# Patient Record
Sex: FEMALE | Race: WHITE | Employment: UNEMPLOYED | URBAN - METROPOLITAN AREA
[De-identification: names, ages, dates, MRNs, and addresses within clinical notes are randomized per-mention and may not be internally consistent; named-entity substitution may affect disease eponyms.]

---

## 2017-01-06 RX ORDER — DULOXETIN HYDROCHLORIDE 30 MG/1
CAPSULE, DELAYED RELEASE ORAL
Qty: 30 CAPSULE | Refills: 1 | Status: SHIPPED | OUTPATIENT
Start: 2017-01-06 | End: 2017-10-26

## 2017-01-06 NOTE — TELEPHONE ENCOUNTER
LOV: 10/28/16  Future office visit: No Upcoming Visit  Last labs: 7/28/16 Cmp Cbc Lipid Tsh  Last RX: 9/23/16 #90 # 3 refills  Per protocol: Route to Provider

## 2017-01-09 RX ORDER — DULOXETIN HYDROCHLORIDE 30 MG/1
CAPSULE, DELAYED RELEASE ORAL
Qty: 90 CAPSULE | Refills: 1 | OUTPATIENT
Start: 2017-01-09

## 2017-10-18 ENCOUNTER — TELEPHONE (OUTPATIENT)
Dept: INTERNAL MEDICINE CLINIC | Facility: CLINIC | Age: 57
End: 2017-10-18

## 2017-10-18 DIAGNOSIS — I10 ESSENTIAL HYPERTENSION: Primary | ICD-10-CM

## 2017-10-18 DIAGNOSIS — Z13.220 LIPID SCREENING: ICD-10-CM

## 2017-10-18 DIAGNOSIS — Z12.39 BREAST CANCER SCREENING: ICD-10-CM

## 2017-10-18 DIAGNOSIS — D51.0 PERNICIOUS ANEMIA: ICD-10-CM

## 2017-10-18 DIAGNOSIS — R92.8 ABNORMAL MAMMOGRAM: ICD-10-CM

## 2017-10-18 NOTE — TELEPHONE ENCOUNTER
CPE Future Appointments  Date Time Provider Dianne Dial   10/18/2017 9:45 AM Jd Kaplan MD [facility]   10/26/2017 4:00 PM Jae Mclaughlin PA-C [facility]     Orders to  Royce Olvera   aware must fast no call back required

## 2017-10-19 NOTE — TELEPHONE ENCOUNTER
Please call pt to let her know labs and mammogram ordered for [facility]. Thank you.

## 2017-10-20 ENCOUNTER — LAB ENCOUNTER (OUTPATIENT)
Dept: LAB | Facility: HOSPITAL | Age: 57
End: 2017-10-20
Attending: PHYSICIAN ASSISTANT
Payer: COMMERCIAL

## 2017-10-20 DIAGNOSIS — E53.8 B12 DEFICIENCY: ICD-10-CM

## 2017-10-20 DIAGNOSIS — Z13.220 LIPID SCREENING: ICD-10-CM

## 2017-10-20 DIAGNOSIS — I10 ESSENTIAL HYPERTENSION: ICD-10-CM

## 2017-10-20 DIAGNOSIS — D51.0 PERNICIOUS ANEMIA: ICD-10-CM

## 2017-10-20 PROCEDURE — 85025 COMPLETE CBC W/AUTO DIFF WBC: CPT

## 2017-10-20 PROCEDURE — 80053 COMPREHEN METABOLIC PANEL: CPT

## 2017-10-20 PROCEDURE — 82607 VITAMIN B-12: CPT

## 2017-10-20 PROCEDURE — 80061 LIPID PANEL: CPT

## 2017-10-20 PROCEDURE — 36415 COLL VENOUS BLD VENIPUNCTURE: CPT

## 2017-10-26 ENCOUNTER — OFFICE VISIT (OUTPATIENT)
Dept: INTERNAL MEDICINE CLINIC | Facility: CLINIC | Age: 57
End: 2017-10-26

## 2017-10-26 VITALS
OXYGEN SATURATION: 96 % | DIASTOLIC BLOOD PRESSURE: 78 MMHG | HEIGHT: 61.5 IN | RESPIRATION RATE: 18 BRPM | TEMPERATURE: 98 F | SYSTOLIC BLOOD PRESSURE: 106 MMHG | HEART RATE: 88 BPM | BODY MASS INDEX: 43.43 KG/M2 | WEIGHT: 233 LBS

## 2017-10-26 DIAGNOSIS — Z12.11 COLON CANCER SCREENING: ICD-10-CM

## 2017-10-26 DIAGNOSIS — M06.9 RHEUMATOID ARTHRITIS, INVOLVING UNSPECIFIED SITE, UNSPECIFIED RHEUMATOID FACTOR PRESENCE: ICD-10-CM

## 2017-10-26 DIAGNOSIS — L98.9 SKIN LESION: ICD-10-CM

## 2017-10-26 DIAGNOSIS — E78.00 PURE HYPERCHOLESTEROLEMIA: ICD-10-CM

## 2017-10-26 DIAGNOSIS — Z00.00 ANNUAL PHYSICAL EXAM: Primary | ICD-10-CM

## 2017-10-26 DIAGNOSIS — I10 ESSENTIAL HYPERTENSION: ICD-10-CM

## 2017-10-26 PROCEDURE — 99396 PREV VISIT EST AGE 40-64: CPT | Performed by: PHYSICIAN ASSISTANT

## 2017-10-26 RX ORDER — ROSUVASTATIN CALCIUM 5 MG/1
5 TABLET, COATED ORAL NIGHTLY
Qty: 30 TABLET | Refills: 2 | Status: SHIPPED | OUTPATIENT
Start: 2017-10-26 | End: 2018-01-23

## 2017-10-26 RX ORDER — PANTOPRAZOLE SODIUM 40 MG/1
TABLET, DELAYED RELEASE ORAL
Refills: 1 | Status: CANCELLED | OUTPATIENT
Start: 2017-10-26

## 2017-10-26 RX ORDER — LISINOPRIL AND HYDROCHLOROTHIAZIDE 20; 12.5 MG/1; MG/1
1 TABLET ORAL DAILY
Qty: 90 TABLET | Refills: 3 | Status: SHIPPED | OUTPATIENT
Start: 2017-10-26 | End: 2018-10-28

## 2017-10-26 NOTE — PROGRESS NOTES
Patient presents with:  Physical: pt needs an order for a mamm. pt does not want pap she does not have a uterus. ROOM 7      HPI:  Pt presents for annual physical.  She states she has seen a Rheum who has diagnosed her with RA.   Pt reports h/o hyst so she Osteoarthritis of knees, bilateral     Rotator cuff tear arthropathy of both shoulders      Past Medical History:   Diagnosis Date   • Arthritis    • Fibroid 2003   • Fibromyalgia    • HIGH CHOLESTEROL    • HYPERTENSION    • Ovarian cyst 2003     Past Surg to do hemoccult testing  Pap:  S/p hyst.  Mammogram:  Overdue      Physical Exam  /78   Pulse 88   Temp 98 °F (36.7 °C) (Oral)   Resp 18   Ht 61.5\"   Wt 233 lb   SpO2 96%   BMI 43.31 kg/m²   Constitutional: Oriented to person, place, and time.  No di 10/20/2017 3.9  3.6 - 5.1 mmol/L Final   • Chloride 10/20/2017 103  101 - 111 mmol/L Final   • CO2 10/20/2017 29.0  22.0 - 32.0 mmol/L Final   • Vitamin B12 10/20/2017 804  193 - 986 pg/mL Final   • Cholesterol, Total 10/20/2017 237* <200 mg/dL Final   • T lesion - Derm referral.    Colon cancer screening  Rheumatoid arthritis, involving unspecified site, unspecified rheumatoid factor presence (hcc) - Pt is following with out of network Rheum, she cannot remember the name today.   Essential hypertension - Con

## 2017-11-16 ENCOUNTER — HOSPITAL ENCOUNTER (OUTPATIENT)
Dept: MAMMOGRAPHY | Facility: HOSPITAL | Age: 57
Discharge: HOME | End: 2017-11-16
Attending: PHYSICIAN ASSISTANT
Payer: COMMERCIAL

## 2017-11-16 DIAGNOSIS — Z12.39 BREAST CANCER SCREENING: ICD-10-CM

## 2017-11-16 DIAGNOSIS — R92.8 ABNORMAL MAMMOGRAM: ICD-10-CM

## 2017-11-16 PROCEDURE — 77062 BREAST TOMOSYNTHESIS BI: CPT | Performed by: PHYSICIAN ASSISTANT

## 2017-11-16 PROCEDURE — 77066 DX MAMMO INCL CAD BI: CPT | Performed by: PHYSICIAN ASSISTANT

## 2017-11-16 PROCEDURE — 76642 ULTRASOUND BREAST LIMITED: CPT | Performed by: PHYSICIAN ASSISTANT

## 2018-01-24 RX ORDER — ROSUVASTATIN CALCIUM 5 MG/1
TABLET, COATED ORAL
Qty: 30 TABLET | Refills: 1 | Status: SHIPPED | OUTPATIENT
Start: 2018-01-24 | End: 2018-06-07

## 2018-05-08 ENCOUNTER — MED REC SCAN ONLY (OUTPATIENT)
Dept: INTERNAL MEDICINE CLINIC | Facility: CLINIC | Age: 58
End: 2018-05-08

## 2018-06-07 ENCOUNTER — OFFICE VISIT (OUTPATIENT)
Dept: INTERNAL MEDICINE CLINIC | Facility: CLINIC | Age: 58
End: 2018-06-07

## 2018-06-07 VITALS
TEMPERATURE: 97 F | RESPIRATION RATE: 18 BRPM | WEIGHT: 241 LBS | SYSTOLIC BLOOD PRESSURE: 132 MMHG | BODY MASS INDEX: 44.92 KG/M2 | DIASTOLIC BLOOD PRESSURE: 80 MMHG | HEIGHT: 61.5 IN | HEART RATE: 84 BPM

## 2018-06-07 DIAGNOSIS — Z12.11 COLON CANCER SCREENING: ICD-10-CM

## 2018-06-07 DIAGNOSIS — Z12.39 SCREENING BREAST EXAMINATION: ICD-10-CM

## 2018-06-07 DIAGNOSIS — Z00.00 ANNUAL PHYSICAL EXAM: Primary | ICD-10-CM

## 2018-06-07 DIAGNOSIS — R05.9 COUGH: ICD-10-CM

## 2018-06-07 DIAGNOSIS — D51.0 PERNICIOUS ANEMIA: ICD-10-CM

## 2018-06-07 DIAGNOSIS — R79.89 ABNORMAL CBC: ICD-10-CM

## 2018-06-07 DIAGNOSIS — E78.00 HYPERCHOLESTEREMIA: ICD-10-CM

## 2018-06-07 PROCEDURE — 99396 PREV VISIT EST AGE 40-64: CPT | Performed by: PHYSICIAN ASSISTANT

## 2018-06-07 NOTE — PROGRESS NOTES
Patient presents with:  Physical: AB RM 7,      HPI:  Pt presents for annual physical.  She has chronic issues with pain related to \"rheumatoid arthritis\" managed by Dr. Megha Wagner (Pain Specialist), she also carries the diagnosis of fibromyalgia. Psychiatric/Behavioral: The patient is not nervous/anxious. No depression.     Patient Active Problem List:     Pernicious anemia     Osteoarthritis of patellofemoral joint     Effusion of knee     Unspecified internal derangement of knee     HTN (hyperte sx    Health Maintenance  Immunizations:  Refuses Tdap. There is no immunization history for the selected administration types on file for this patient. Dental Visits:  Yes  Colonoscopy:  Never  Pap:  s/p hyst  Mammogram:  UTD.       Physical Exam  BP 1 Suspect post infectious cough vs post nasal drip. She will start steroid nasal spray she has at home if not resolved in the next 2-3 weeks. If persists for over a month she was instructed to f/u in office.   \"RA\" and chronic pain syndrome - Pt was asked

## 2018-06-13 ENCOUNTER — LAB ENCOUNTER (OUTPATIENT)
Dept: LAB | Age: 58
End: 2018-06-13
Attending: PHYSICIAN ASSISTANT
Payer: COMMERCIAL

## 2018-06-13 DIAGNOSIS — E78.00 HYPERCHOLESTEREMIA: ICD-10-CM

## 2018-06-13 DIAGNOSIS — D51.0 PERNICIOUS ANEMIA: ICD-10-CM

## 2018-06-13 DIAGNOSIS — R79.89 ABNORMAL CBC: ICD-10-CM

## 2018-06-13 PROCEDURE — 82607 VITAMIN B-12: CPT | Performed by: PHYSICIAN ASSISTANT

## 2018-06-13 PROCEDURE — 80061 LIPID PANEL: CPT | Performed by: PHYSICIAN ASSISTANT

## 2018-06-13 PROCEDURE — 80053 COMPREHEN METABOLIC PANEL: CPT | Performed by: PHYSICIAN ASSISTANT

## 2018-06-13 PROCEDURE — 85025 COMPLETE CBC W/AUTO DIFF WBC: CPT | Performed by: PHYSICIAN ASSISTANT

## 2018-06-14 DIAGNOSIS — E78.00 HYPERCHOLESTEREMIA: Primary | ICD-10-CM

## 2018-06-14 RX ORDER — COLESEVELAM 180 1/1
1875 TABLET ORAL 2 TIMES DAILY WITH MEALS
Qty: 180 TABLET | Refills: 3 | Status: SHIPPED | OUTPATIENT
Start: 2018-06-14 | End: 2019-01-22

## 2018-06-15 NOTE — PROGRESS NOTES
Chol continues to be elevated. She does not tolerate any statin. Try another class. Try Welchol. I sent Rx and she is to repeat labs in 3 mos. Continue to work on 1850 upad and wt loss.   Other labs normal.

## 2018-09-07 ENCOUNTER — APPOINTMENT (OUTPATIENT)
Dept: GENERAL RADIOLOGY | Age: 58
End: 2018-09-07
Attending: PHYSICIAN ASSISTANT
Payer: COMMERCIAL

## 2018-09-07 ENCOUNTER — HOSPITAL ENCOUNTER (EMERGENCY)
Age: 58
Discharge: HOME | End: 2018-09-07
Attending: EMERGENCY MEDICINE
Payer: COMMERCIAL

## 2018-09-07 VITALS
OXYGEN SATURATION: 98 % | HEIGHT: 62 IN | BODY MASS INDEX: 39.38 KG/M2 | HEART RATE: 80 BPM | SYSTOLIC BLOOD PRESSURE: 142 MMHG | TEMPERATURE: 98 F | WEIGHT: 214 LBS | RESPIRATION RATE: 20 BRPM | DIASTOLIC BLOOD PRESSURE: 65 MMHG

## 2018-09-07 DIAGNOSIS — S16.1XXA STRAIN OF NECK MUSCLE, INITIAL ENCOUNTER: Primary | ICD-10-CM

## 2018-09-07 DIAGNOSIS — S39.012A LUMBAR SPINE STRAIN, INITIAL ENCOUNTER: ICD-10-CM

## 2018-09-07 DIAGNOSIS — S80.01XA CONTUSION OF RIGHT KNEE, INITIAL ENCOUNTER: ICD-10-CM
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
## 2018-09-07 PROCEDURE — 99283 EMERGENCY DEPT VISIT LOW MDM: CPT

## 2018-09-07 PROCEDURE — 73562 X-RAY EXAM OF KNEE 3: CPT | Performed by: PHYSICIAN ASSISTANT

## 2018-09-07 PROCEDURE — 99284 EMERGENCY DEPT VISIT MOD MDM: CPT

## 2018-09-07 RX ORDER — ACETAMINOPHEN AND CODEINE PHOSPHATE 300; 30 MG/1; MG/1
1 TABLET ORAL EVERY 6 HOURS PRN
Qty: 12 TABLET | Refills: 0 | Status: SHIPPED | OUTPATIENT
Start: 2018-09-07 | End: 2019-01-22

## 2018-09-07 RX ORDER — ACETAMINOPHEN 500 MG
1000 TABLET ORAL ONCE
Status: COMPLETED | OUTPATIENT
Start: 2018-09-07 | End: 2018-09-07

## 2018-09-07 RX ORDER — CYCLOBENZAPRINE HCL 10 MG
10 TABLET ORAL 3 TIMES DAILY PRN
Qty: 20 TABLET | Refills: 0 | Status: SHIPPED | OUTPATIENT
Start: 2018-09-07 | End: 2018-09-14

## 2018-09-07 RX ORDER — CYCLOBENZAPRINE HCL 10 MG
10 TABLET ORAL ONCE
Status: COMPLETED | OUTPATIENT
Start: 2018-09-07 | End: 2018-09-07
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
## 2018-09-12 ENCOUNTER — TELEPHONE (OUTPATIENT)
Dept: INTERNAL MEDICINE CLINIC | Facility: CLINIC | Age: 58
End: 2018-09-12

## 2018-09-12 NOTE — TELEPHONE ENCOUNTER
called to schedule pt same day and called to say they are unable to make it.  Will call back to r.s if needed

## 2018-09-18 ENCOUNTER — HOSPITAL ENCOUNTER (OUTPATIENT)
Dept: PHYSICAL THERAPY | Facility: HOSPITAL | Age: 58
Setting detail: THERAPIES SERIES
Discharge: HOME | End: 2018-09-18
Attending: PHYSICAL MEDICINE & REHABILITATION
Payer: COMMERCIAL

## 2018-09-18 DIAGNOSIS — M54.2 CERVICAL SPINE PAIN: ICD-10-CM

## 2018-09-18 DIAGNOSIS — S89.91XA RIGHT KNEE INJURY: ICD-10-CM

## 2018-09-18 PROCEDURE — 97162 PT EVAL MOD COMPLEX 30 MIN: CPT

## 2018-09-18 PROCEDURE — 97530 THERAPEUTIC ACTIVITIES: CPT
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
## 2018-09-19 PROCEDURE — 88305 TISSUE EXAM BY PATHOLOGIST: CPT | Performed by: INTERNAL MEDICINE

## 2018-09-20 ENCOUNTER — HOSPITAL ENCOUNTER (OUTPATIENT)
Dept: GENERAL RADIOLOGY | Facility: HOSPITAL | Age: 58
Discharge: HOME | End: 2018-09-20
Attending: PHYSICAL MEDICINE & REHABILITATION
Payer: COMMERCIAL

## 2018-09-20 ENCOUNTER — HOSPITAL ENCOUNTER (OUTPATIENT)
Dept: PHYSICAL THERAPY | Facility: HOSPITAL | Age: 58
Setting detail: THERAPIES SERIES
Discharge: HOME | End: 2018-09-20
Attending: PHYSICAL MEDICINE & REHABILITATION
Payer: COMMERCIAL

## 2018-09-20 ENCOUNTER — OFFICE VISIT (OUTPATIENT)
Dept: INTERNAL MEDICINE CLINIC | Facility: CLINIC | Age: 58
End: 2018-09-20
Payer: COMMERCIAL

## 2018-09-20 VITALS
WEIGHT: 228 LBS | HEIGHT: 62 IN | TEMPERATURE: 98 F | SYSTOLIC BLOOD PRESSURE: 120 MMHG | RESPIRATION RATE: 20 BRPM | DIASTOLIC BLOOD PRESSURE: 80 MMHG | BODY MASS INDEX: 41.96 KG/M2 | HEART RATE: 80 BPM
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
## 2018-09-20 DIAGNOSIS — R51.9 HEAD PAIN: ICD-10-CM

## 2018-09-20 DIAGNOSIS — G89.29 CHRONIC PAIN OF RIGHT KNEE: ICD-10-CM

## 2018-09-20 DIAGNOSIS — M54.2 NECK PAIN: Primary | ICD-10-CM

## 2018-09-20 DIAGNOSIS — G44.209 TENSION HEADACHE: ICD-10-CM

## 2018-09-20 DIAGNOSIS — M25.561 CHRONIC PAIN OF RIGHT KNEE: ICD-10-CM

## 2018-09-20 PROCEDURE — 97110 THERAPEUTIC EXERCISES: CPT

## 2018-09-20 PROCEDURE — 99213 OFFICE O/P EST LOW 20 MIN: CPT | Performed by: PHYSICIAN ASSISTANT

## 2018-09-20 PROCEDURE — 72050 X-RAY EXAM NECK SPINE 4/5VWS: CPT | Performed by: PHYSICAL MEDICINE & REHABILITATION

## 2018-09-20 PROCEDURE — 97140 MANUAL THERAPY 1/> REGIONS: CPT

## 2018-09-20 RX ORDER — METAXALONE 800 MG/1
400 TABLET ORAL 3 TIMES DAILY PRN
Qty: 20 TABLET | Refills: 0 | Status: SHIPPED | OUTPATIENT
Start: 2018-09-20 | End: 2019-10-31
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
## 2018-10-02 ENCOUNTER — APPOINTMENT (OUTPATIENT)
Dept: PHYSICAL THERAPY | Facility: HOSPITAL | Age: 58
End: 2018-10-02
Attending: PHYSICAL MEDICINE & REHABILITATION
Payer: COMMERCIAL

## 2018-10-02 ENCOUNTER — TELEPHONE (OUTPATIENT)
Dept: PHYSICAL THERAPY | Facility: HOSPITAL | Age: 58
End: 2018-10-02

## 2018-10-04 ENCOUNTER — HOSPITAL ENCOUNTER (OUTPATIENT)
Dept: PHYSICAL THERAPY | Facility: HOSPITAL | Age: 58
Setting detail: THERAPIES SERIES
Discharge: HOME | End: 2018-10-04
Attending: PHYSICAL MEDICINE & REHABILITATION
Payer: COMMERCIAL

## 2018-10-04 PROCEDURE — 97110 THERAPEUTIC EXERCISES: CPT

## 2018-10-04 PROCEDURE — 97140 MANUAL THERAPY 1/> REGIONS: CPT
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
## 2018-10-09 ENCOUNTER — HOSPITAL ENCOUNTER (OUTPATIENT)
Dept: PHYSICAL THERAPY | Facility: HOSPITAL | Age: 58
Setting detail: THERAPIES SERIES
Discharge: HOME | End: 2018-10-09
Attending: PHYSICAL MEDICINE & REHABILITATION
Payer: COMMERCIAL

## 2018-10-09 PROCEDURE — 97110 THERAPEUTIC EXERCISES: CPT

## 2018-10-09 PROCEDURE — 97140 MANUAL THERAPY 1/> REGIONS: CPT
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
## 2018-10-11 ENCOUNTER — HOSPITAL ENCOUNTER (OUTPATIENT)
Dept: PHYSICAL THERAPY | Facility: HOSPITAL | Age: 58
Setting detail: THERAPIES SERIES
Discharge: HOME | End: 2018-10-11
Attending: PHYSICAL MEDICINE & REHABILITATION
Payer: COMMERCIAL

## 2018-10-11 PROCEDURE — 97110 THERAPEUTIC EXERCISES: CPT

## 2018-10-11 PROCEDURE — 97140 MANUAL THERAPY 1/> REGIONS: CPT
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
## 2018-10-16 ENCOUNTER — TELEPHONE (OUTPATIENT)
Dept: INTERNAL MEDICINE CLINIC | Facility: CLINIC | Age: 58
End: 2018-10-16

## 2018-10-16 DIAGNOSIS — M25.561 CHRONIC PAIN OF RIGHT KNEE: Primary | ICD-10-CM

## 2018-10-16 DIAGNOSIS — G89.29 CHRONIC PAIN OF RIGHT KNEE: Primary | ICD-10-CM
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
## 2018-10-16 NOTE — TELEPHONE ENCOUNTER
Pt called and stated that she has a \"knee issue\" and is doing PT but its not helping so pt would like a recommendation from CB for a Ortho Knee Surgeon     Please advise

## 2018-10-17 NOTE — TELEPHONE ENCOUNTER
Called pt to inform, per CB, referred to Ortho- Dr. Aisha Mariano  [address] [place] [address], [address] [place] [address]   Phone: [phone]   Pt verbalized understanding and agreed with POC.

## 2018-10-23 ENCOUNTER — HOSPITAL ENCOUNTER (OUTPATIENT)
Dept: PHYSICAL THERAPY | Facility: HOSPITAL | Age: 58
Setting detail: THERAPIES SERIES
Discharge: HOME | End: 2018-10-23
Attending: PHYSICAL MEDICINE & REHABILITATION
Payer: COMMERCIAL

## 2018-10-23 PROCEDURE — 97140 MANUAL THERAPY 1/> REGIONS: CPT

## 2018-10-23 PROCEDURE — 97110 THERAPEUTIC EXERCISES: CPT
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
## 2018-10-25 ENCOUNTER — HOSPITAL ENCOUNTER (OUTPATIENT)
Dept: PHYSICAL THERAPY | Facility: HOSPITAL | Age: 58
Setting detail: THERAPIES SERIES
Discharge: HOME | End: 2018-10-25
Attending: PHYSICAL MEDICINE & REHABILITATION
Payer: COMMERCIAL

## 2018-10-25 ENCOUNTER — HOSPITAL ENCOUNTER (OUTPATIENT)
Dept: ULTRASOUND IMAGING | Facility: HOSPITAL | Age: 58
Discharge: HOME | End: 2018-10-25
Attending: PHYSICAL MEDICINE & REHABILITATION
Payer: COMMERCIAL

## 2018-10-25 DIAGNOSIS — M79.604 RIGHT LEG PAIN: ICD-10-CM

## 2018-10-25 PROCEDURE — 97140 MANUAL THERAPY 1/> REGIONS: CPT

## 2018-10-25 PROCEDURE — 93971 EXTREMITY STUDY: CPT | Performed by: PHYSICAL MEDICINE & REHABILITATION

## 2018-10-25 PROCEDURE — 97110 THERAPEUTIC EXERCISES: CPT
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
## 2018-10-29 RX ORDER — LISINOPRIL AND HYDROCHLOROTHIAZIDE 20; 12.5 MG/1; MG/1
1 TABLET ORAL DAILY
Qty: 90 TABLET | Refills: 1 | Status: SHIPPED | OUTPATIENT
Start: 2018-10-29 | End: 2019-06-04

## 2018-11-19 ENCOUNTER — HOSPITAL ENCOUNTER (OUTPATIENT)
Dept: MAMMOGRAPHY | Facility: HOSPITAL | Age: 58
Discharge: HOME | End: 2018-11-19
Attending: PHYSICIAN ASSISTANT
Payer: COMMERCIAL

## 2018-11-19 DIAGNOSIS — Z12.39 SCREENING BREAST EXAMINATION: ICD-10-CM

## 2018-11-19 PROCEDURE — 77063 BREAST TOMOSYNTHESIS BI: CPT | Performed by: PHYSICIAN ASSISTANT

## 2018-11-19 PROCEDURE — 77067 SCR MAMMO BI INCL CAD: CPT | Performed by: PHYSICIAN ASSISTANT

## 2018-11-27 ENCOUNTER — HOSPITAL ENCOUNTER (OUTPATIENT)
Dept: PHYSICAL THERAPY | Facility: HOSPITAL | Age: 58
Setting detail: THERAPIES SERIES
Discharge: HOME | End: 2018-11-27
Attending: INTERNAL MEDICINE
Payer: COMMERCIAL

## 2018-11-27 PROCEDURE — 97110 THERAPEUTIC EXERCISES: CPT

## 2018-11-27 PROCEDURE — 97140 MANUAL THERAPY 1/> REGIONS: CPT
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
## 2019-01-22 RX ORDER — ASPIRIN 325 MG
325 TABLET ORAL DAILY
Status: ON HOLD | COMMUNITY
End: 2019-02-14

## 2019-02-04 ENCOUNTER — HOSPITAL ENCOUNTER (OUTPATIENT)
Dept: PHYSICAL THERAPY | Facility: HOSPITAL | Age: 59
Discharge: HOME | End: 2019-02-04
Attending: ORTHOPAEDIC SURGERY
Payer: COMMERCIAL

## 2019-02-04 ENCOUNTER — LABORATORY ENCOUNTER (OUTPATIENT)
Dept: LAB | Facility: HOSPITAL | Age: 59
End: 2019-02-04
Payer: COMMERCIAL

## 2019-02-04 ENCOUNTER — APPOINTMENT (OUTPATIENT)
Dept: LAB | Facility: HOSPITAL | Age: 59
End: 2019-02-04
Payer: COMMERCIAL

## 2019-02-04 DIAGNOSIS — M17.11 PRIMARY OSTEOARTHRITIS OF RIGHT KNEE: ICD-10-CM

## 2019-02-04 LAB
ALBUMIN SERPL-MCNC: 3.6 G/DL (ref 3.1–4.5)
ALBUMIN/GLOB SERPL: 0.9 {RATIO} (ref 1–2)
ALP LIVER SERPL-CCNC: 97 U/L (ref 46–118)
ALT SERPL-CCNC: 27 U/L (ref 14–54)
ANION GAP SERPL CALC-SCNC: 10 MMOL/L (ref 0–18)
ANTIBODY SCREEN: NEGATIVE
APTT PPP: 28.7 SECONDS (ref 26.1–34.6)
AST SERPL-CCNC: 20 U/L (ref 15–41)
ATRIAL RATE: 95 BPM
BASOPHILS # BLD AUTO: 0.08 X10(3) UL (ref 0–0.2)
BASOPHILS NFR BLD AUTO: 0.7 %
BILIRUB SERPL-MCNC: 0.3 MG/DL (ref 0.1–2)
BILIRUB UR QL STRIP.AUTO: NEGATIVE
BUN BLD-MCNC: 20 MG/DL (ref 8–20)
BUN/CREAT SERPL: 21.1 (ref 10–20)
CALCIUM BLD-MCNC: 9.4 MG/DL (ref 8.3–10.3)
CHLORIDE SERPL-SCNC: 105 MMOL/L (ref 101–111)
CO2 SERPL-SCNC: 22 MMOL/L (ref 22–32)
COLOR UR AUTO: YELLOW
CREAT BLD-MCNC: 0.95 MG/DL (ref 0.55–1.02)
DEPRECATED RDW RBC AUTO: 41 FL (ref 35.1–46.3)
EOSINOPHIL # BLD AUTO: 0.14 X10(3) UL (ref 0–0.7)
EOSINOPHIL NFR BLD AUTO: 1.3 %
ERYTHROCYTE [DISTWIDTH] IN BLOOD BY AUTOMATED COUNT: 13.5 % (ref 11–15)
GLOBULIN PLAS-MCNC: 4 G/DL (ref 2.8–4.4)
GLUCOSE BLD-MCNC: 91 MG/DL (ref 70–99)
GLUCOSE UR STRIP.AUTO-MCNC: NEGATIVE MG/DL
HCT VFR BLD AUTO: 41.3 % (ref 35–48)
HGB BLD-MCNC: 13.3 G/DL (ref 12–16)
HYALINE CASTS #/AREA URNS AUTO: PRESENT /LPF
IMM GRANULOCYTES # BLD AUTO: 0.05 X10(3) UL (ref 0–1)
IMM GRANULOCYTES NFR BLD: 0.5 %
INR BLD: 1.01 (ref 0.9–1.1)
LYMPHOCYTES # BLD AUTO: 3.99 X10(3) UL (ref 1–4)
LYMPHOCYTES NFR BLD AUTO: 36 %
M PROTEIN MFR SERPL ELPH: 7.6 G/DL (ref 6.4–8.2)
MCH RBC QN AUTO: 26.9 PG (ref 26–34)
MCHC RBC AUTO-ENTMCNC: 32.2 G/DL (ref 31–37)
MCV RBC AUTO: 83.4 FL (ref 80–100)
MONOCYTES # BLD AUTO: 0.68 X10(3) UL (ref 0.1–1)
MONOCYTES NFR BLD AUTO: 6.1 %
NEUTROPHILS # BLD AUTO: 6.15 X10 (3) UL (ref 1.5–7.7)
NEUTROPHILS # BLD AUTO: 6.15 X10(3) UL (ref 1.5–7.7)
NEUTROPHILS NFR BLD AUTO: 55.4 %
NITRITE UR QL STRIP.AUTO: NEGATIVE
OSMOLALITY SERPL CALC.SUM OF ELEC: 286 MOSM/KG (ref 275–295)
P AXIS: 47 DEGREES
P-R INTERVAL: 148 MS
PH UR STRIP.AUTO: 5 [PH] (ref 4.5–8)
PLATELET # BLD AUTO: 342 10(3)UL (ref 150–450)
POTASSIUM SERPL-SCNC: 3.6 MMOL/L (ref 3.6–5.1)
PROT UR STRIP.AUTO-MCNC: NEGATIVE MG/DL
PSA SERPL DL<=0.01 NG/ML-MCNC: 13.7 SECONDS (ref 12.4–14.7)
Q-T INTERVAL: 338 MS
QRS DURATION: 78 MS
QTC CALCULATION (BEZET): 424 MS
R AXIS: -9 DEGREES
RBC # BLD AUTO: 4.95 X10(6)UL (ref 3.8–5.3)
RBC UR QL AUTO: NEGATIVE
RH BLOOD TYPE: POSITIVE
SODIUM SERPL-SCNC: 137 MMOL/L (ref 136–144)
SP GR UR STRIP.AUTO: 1.02 (ref 1–1.03)
T AXIS: 14 DEGREES
UROBILINOGEN UR STRIP.AUTO-MCNC: 0.2 MG/DL
VENTRICULAR RATE: 95 BPM
WBC # BLD AUTO: 11.1 X10(3) UL (ref 4–11)

## 2019-02-04 PROCEDURE — 85730 THROMBOPLASTIN TIME PARTIAL: CPT

## 2019-02-04 PROCEDURE — 87086 URINE CULTURE/COLONY COUNT: CPT

## 2019-02-04 PROCEDURE — 81001 URINALYSIS AUTO W/SCOPE: CPT

## 2019-02-04 PROCEDURE — 36415 COLL VENOUS BLD VENIPUNCTURE: CPT

## 2019-02-04 PROCEDURE — 87081 CULTURE SCREEN ONLY: CPT

## 2019-02-04 PROCEDURE — 80053 COMPREHEN METABOLIC PANEL: CPT

## 2019-02-04 PROCEDURE — 86901 BLOOD TYPING SEROLOGIC RH(D): CPT

## 2019-02-04 PROCEDURE — 85610 PROTHROMBIN TIME: CPT

## 2019-02-04 PROCEDURE — 86900 BLOOD TYPING SEROLOGIC ABO: CPT

## 2019-02-04 PROCEDURE — 93010 ELECTROCARDIOGRAM REPORT: CPT | Performed by: INTERNAL MEDICINE

## 2019-02-04 PROCEDURE — 93005 ELECTROCARDIOGRAM TRACING: CPT

## 2019-02-04 PROCEDURE — 86850 RBC ANTIBODY SCREEN: CPT

## 2019-02-04 PROCEDURE — 85025 COMPLETE CBC W/AUTO DIFF WBC: CPT

## 2019-02-06 NOTE — PAT NURSING NOTE
Faxed urine culture report to [facility] ,surgeon as follow up to abnormal urinalysis report and attached abnormal nasal culture report to advise of positive MSSA

## 2019-02-07 ENCOUNTER — OFFICE VISIT (OUTPATIENT)
Dept: INTERNAL MEDICINE CLINIC | Facility: CLINIC | Age: 59
End: 2019-02-07
Payer: COMMERCIAL

## 2019-02-07 VITALS
SYSTOLIC BLOOD PRESSURE: 118 MMHG | HEART RATE: 68 BPM | TEMPERATURE: 98 F | HEIGHT: 62 IN | DIASTOLIC BLOOD PRESSURE: 80 MMHG | BODY MASS INDEX: 44.01 KG/M2 | WEIGHT: 239.19 LBS | RESPIRATION RATE: 16 BRPM

## 2019-02-07 DIAGNOSIS — M25.561 CHRONIC PAIN OF RIGHT KNEE: Primary | ICD-10-CM

## 2019-02-07 DIAGNOSIS — G89.29 CHRONIC PAIN OF RIGHT KNEE: Primary | ICD-10-CM

## 2019-02-07 DIAGNOSIS — I10 ESSENTIAL HYPERTENSION: ICD-10-CM

## 2019-02-07 DIAGNOSIS — Z01.818 PRE-OP TESTING: ICD-10-CM

## 2019-02-07 PROCEDURE — 99214 OFFICE O/P EST MOD 30 MIN: CPT | Performed by: PHYSICIAN ASSISTANT

## 2019-02-07 RX ORDER — FLUTICASONE PROPIONATE 50 MCG
1 SPRAY, SUSPENSION (ML) NASAL DAILY
Refills: 0 | COMMUNITY
Start: 2019-01-07

## 2019-02-07 NOTE — PROGRESS NOTES
Patient presents with:  Pre-Op Exam: Room 2 TO pt is here for rt knee replacement w/ Dr Althea Vigil on 2/13/19       HPI:  Pt presents for pre-operative evaluation at the request of Dr. Jodie Erazo. She is scheduled to have a R TKA on 2/13/19.   She has had pre-o • Fibromyalgia    • HIGH CHOLESTEROL    • HYPERTENSION    • Osteoarthritis    • Ovarian cyst 2003     Past Surgical History:   Procedure Laterality Date   • APPENDECTOMY     • CHOLECYSTECTOMY  2000   • COLONOSCOPY  09/2018   • COLONOSCOPY, POSSIBLE BIOPS 3 (three) times daily as needed (spasm). Disp: 20 tablet Rfl: 0   ibuprofen 800 MG Oral Tab Take 800 mg by mouth every 6 (six) hours as needed for Pain.  Disp:  Rfl:        Allergies    Mobic                       Comment:Heart attack sx    Physical Exam  B 6/7/2019) for annual physical.    There are no Patient Instructions on file for this visit. All questions were answered and the patient understands the plan.

## 2019-02-12 ENCOUNTER — ANESTHESIA EVENT (OUTPATIENT)
Dept: SURGERY | Facility: HOSPITAL | Age: 59
End: 2019-02-12

## 2019-02-12 NOTE — ANESTHESIA PREPROCEDURE EVALUATION
PRE-OP EVALUATION    Patient Name: Rosa Martell    Pre-op Diagnosis: Primary osteoarthritis of right knee [M17.11]    Procedure(s):  RIGHT TOTAL KNEE REPLACEMENT    Surgeon(s) and Role:     Michelle Diaz MD - Primary    Pre-op vitals reviewed.   Temp LISINOPRIL-HYDROCHLOROTHIAZIDE 20-12.5 MG Oral Tab TAKE 1 TABLET BY MOUTH DAILY Disp: 90 tablet Rfl: 1   Metaxalone 800 MG Oral Tab Take 0.5 tablets (400 mg total) by mouth 3 (three) times daily as needed (spasm).  Disp: 20 tablet Rfl: 0   Zolpideleigh Quintana • COLONOSCOPY  09/2018   • COLONOSCOPY, POSSIBLE BIOPSY, POSSIBLE POLYPECTOMY 06878 N/A 9/19/2018    Performed by Marysol Carballo MD at [address] [place] [address]  2004   • OTHER  2003    Left oophorectomy   • OT increased anesthesia risks secondary to current medical conditions that qualify for ASA status III. As documented in the informed consent, risks have been accepted. Offered spinal anesthesia, which is typical anesthesia for this procedure.   Explained b

## 2019-02-12 NOTE — H&P
[facility] Patient Status:  Hospital Outpatient Surgery    5/10/1960 MRN [id number]   [facility] SURGERY Attending Andrew Rangel MD   Hosp Day # 0 PCP Joel Dykes MD     Date of Admission: tenderness to palpitation. No JVD. Supple. Lungs: Clear to auscultation bilaterally. Cardiac: Regular rate and rhythm. No murmur. Abdomen:  Soft, non-distended, non-tender, with no rebound or guarding. Extremities:  No lower extremity edema noted.   Madyson Dates

## 2019-02-13 ENCOUNTER — ANESTHESIA (OUTPATIENT)
Dept: SURGERY | Facility: HOSPITAL | Age: 59
End: 2019-02-13

## 2019-02-13 ENCOUNTER — HOSPITAL ENCOUNTER (OUTPATIENT)
Facility: HOSPITAL | Age: 59
Setting detail: OBSERVATION
Discharge: HOME HEALTH | End: 2019-02-14
Attending: ORTHOPAEDIC SURGERY | Admitting: ORTHOPAEDIC SURGERY
Payer: COMMERCIAL

## 2019-02-13 DIAGNOSIS — M17.11 PRIMARY OSTEOARTHRITIS OF RIGHT KNEE: Primary | ICD-10-CM

## 2019-02-13 PROBLEM — Z96.651 STATUS POST TOTAL RIGHT KNEE REPLACEMENT: Status: ACTIVE | Noted: 2019-02-13

## 2019-02-13 PROCEDURE — 0SRC0J9 REPLACEMENT OF RIGHT KNEE JOINT WITH SYNTHETIC SUBSTITUTE, CEMENTED, OPEN APPROACH: ICD-10-PCS | Performed by: ORTHOPAEDIC SURGERY

## 2019-02-13 PROCEDURE — 3E0T3BZ INTRODUCTION OF ANESTHETIC AGENT INTO PERIPHERAL NERVES AND PLEXI, PERCUTANEOUS APPROACH: ICD-10-PCS | Performed by: ANESTHESIOLOGY

## 2019-02-13 PROCEDURE — 99243 OFF/OP CNSLTJ NEW/EST LOW 30: CPT | Performed by: HOSPITALIST

## 2019-02-13 DEVICE — ATTUNE KNEE SYSTEM FEMORAL POSTERIOR STABILIZED NARROW SIZE 5N RIGHT CEMENTED
Type: IMPLANTABLE DEVICE | Site: KNEE | Status: FUNCTIONAL
Brand: ATTUNE

## 2019-02-13 DEVICE — SMARTSET HV HIGH VISCOSITY BONE CEMENT 40G
Type: IMPLANTABLE DEVICE | Site: KNEE | Status: FUNCTIONAL
Brand: SMARTSET

## 2019-02-13 DEVICE — ATTUNE PATELLA MEDIALIZED DOME 35MM CEMENTED AOX
Type: IMPLANTABLE DEVICE | Site: KNEE | Status: FUNCTIONAL
Brand: ATTUNE

## 2019-02-13 DEVICE — ATTUNE KNEE SYSTEM TIBIAL INSERT ROTATING PLATFORM POSTERIOR STABILIZED 5 7MM AOX
Type: IMPLANTABLE DEVICE | Site: KNEE | Status: FUNCTIONAL
Brand: ATTUNE

## 2019-02-13 DEVICE — SIGMA LCS HIGH PERFORMANCE INSTRUMENTS STERILE THREADED PINS
Type: IMPLANTABLE DEVICE | Site: KNEE | Status: FUNCTIONAL
Brand: SIGMA LCS HIGH PERFORMANCE

## 2019-02-13 RX ORDER — SENNOSIDES 8.6 MG
17.2 TABLET ORAL NIGHTLY
Status: DISCONTINUED | OUTPATIENT
Start: 2019-02-13 | End: 2019-02-14

## 2019-02-13 RX ORDER — CEFAZOLIN SODIUM/WATER 2 G/20 ML
2 SYRINGE (ML) INTRAVENOUS EVERY 8 HOURS
Status: COMPLETED | OUTPATIENT
Start: 2019-02-13 | End: 2019-02-14

## 2019-02-13 RX ORDER — DIPHENHYDRAMINE HCL 25 MG
25 CAPSULE ORAL EVERY 4 HOURS PRN
Status: DISCONTINUED | OUTPATIENT
Start: 2019-02-13 | End: 2019-02-14

## 2019-02-13 RX ORDER — METOCLOPRAMIDE HYDROCHLORIDE 5 MG/ML
10 INJECTION INTRAMUSCULAR; INTRAVENOUS EVERY 6 HOURS PRN
Status: DISCONTINUED | OUTPATIENT
Start: 2019-02-13 | End: 2019-02-14

## 2019-02-13 RX ORDER — HYDROMORPHONE HYDROCHLORIDE 1 MG/ML
0.4 INJECTION, SOLUTION INTRAMUSCULAR; INTRAVENOUS; SUBCUTANEOUS EVERY 2 HOUR PRN
Status: DISCONTINUED | OUTPATIENT
Start: 2019-02-13 | End: 2019-02-14

## 2019-02-13 RX ORDER — KETOROLAC TROMETHAMINE 15 MG/ML
15 INJECTION, SOLUTION INTRAMUSCULAR; INTRAVENOUS EVERY 6 HOURS
Status: COMPLETED | OUTPATIENT
Start: 2019-02-13 | End: 2019-02-14

## 2019-02-13 RX ORDER — SCOLOPAMINE TRANSDERMAL SYSTEM 1 MG/1
1 PATCH, EXTENDED RELEASE TRANSDERMAL ONCE
Status: DISCONTINUED | OUTPATIENT
Start: 2019-02-13 | End: 2019-02-14

## 2019-02-13 RX ORDER — OXYCODONE HCL 10 MG/1
10 TABLET, FILM COATED, EXTENDED RELEASE ORAL
Status: COMPLETED | OUTPATIENT
Start: 2019-02-13 | End: 2019-02-13

## 2019-02-13 RX ORDER — MEPERIDINE HYDROCHLORIDE 25 MG/ML
INJECTION INTRAMUSCULAR; INTRAVENOUS; SUBCUTANEOUS
Status: COMPLETED
Start: 2019-02-13 | End: 2019-02-13

## 2019-02-13 RX ORDER — TRAMADOL HYDROCHLORIDE 50 MG/1
50 TABLET ORAL EVERY 6 HOURS
Status: DISCONTINUED | OUTPATIENT
Start: 2019-02-13 | End: 2019-02-14

## 2019-02-13 RX ORDER — NALOXONE HYDROCHLORIDE 0.4 MG/ML
80 INJECTION, SOLUTION INTRAMUSCULAR; INTRAVENOUS; SUBCUTANEOUS AS NEEDED
Status: DISCONTINUED | OUTPATIENT
Start: 2019-02-13 | End: 2019-02-13 | Stop reason: HOSPADM

## 2019-02-13 RX ORDER — DOCUSATE SODIUM 100 MG/1
100 CAPSULE, LIQUID FILLED ORAL 2 TIMES DAILY
Status: DISCONTINUED | OUTPATIENT
Start: 2019-02-13 | End: 2019-02-14

## 2019-02-13 RX ORDER — ACETAMINOPHEN 500 MG
1000 TABLET ORAL ONCE
Status: ON HOLD | COMMUNITY
End: 2019-02-14

## 2019-02-13 RX ORDER — METOCLOPRAMIDE HYDROCHLORIDE 5 MG/ML
10 INJECTION INTRAMUSCULAR; INTRAVENOUS AS NEEDED
Status: DISCONTINUED | OUTPATIENT
Start: 2019-02-13 | End: 2019-02-13 | Stop reason: HOSPADM

## 2019-02-13 RX ORDER — SODIUM CHLORIDE, SODIUM LACTATE, POTASSIUM CHLORIDE, CALCIUM CHLORIDE 600; 310; 30; 20 MG/100ML; MG/100ML; MG/100ML; MG/100ML
INJECTION, SOLUTION INTRAVENOUS CONTINUOUS
Status: DISCONTINUED | OUTPATIENT
Start: 2019-02-13 | End: 2019-02-13 | Stop reason: HOSPADM

## 2019-02-13 RX ORDER — HYDROMORPHONE HYDROCHLORIDE 1 MG/ML
INJECTION, SOLUTION INTRAMUSCULAR; INTRAVENOUS; SUBCUTANEOUS
Status: COMPLETED
Start: 2019-02-13 | End: 2019-02-13

## 2019-02-13 RX ORDER — FLUTICASONE PROPIONATE 50 MCG
1 SPRAY, SUSPENSION (ML) NASAL DAILY
Status: DISCONTINUED | OUTPATIENT
Start: 2019-02-13 | End: 2019-02-14

## 2019-02-13 RX ORDER — HYDROCODONE BITARTRATE AND ACETAMINOPHEN 10; 325 MG/1; MG/1
1-2 TABLET ORAL EVERY 4 HOURS PRN
Qty: 60 TABLET | Refills: 0 | Status: SHIPPED | OUTPATIENT
Start: 2019-02-13 | End: 2019-03-01

## 2019-02-13 RX ORDER — ONDANSETRON 2 MG/ML
4 INJECTION INTRAMUSCULAR; INTRAVENOUS AS NEEDED
Status: DISCONTINUED | OUTPATIENT
Start: 2019-02-13 | End: 2019-02-13 | Stop reason: HOSPADM

## 2019-02-13 RX ORDER — PANTOPRAZOLE SODIUM 40 MG/1
40 TABLET, DELAYED RELEASE ORAL
Status: DISCONTINUED | OUTPATIENT
Start: 2019-02-14 | End: 2019-02-14

## 2019-02-13 RX ORDER — POLYETHYLENE GLYCOL 3350 17 G/17G
17 POWDER, FOR SOLUTION ORAL DAILY PRN
Status: DISCONTINUED | OUTPATIENT
Start: 2019-02-13 | End: 2019-02-14

## 2019-02-13 RX ORDER — ACETAMINOPHEN 325 MG/1
650 TABLET ORAL ONCE
Status: COMPLETED | OUTPATIENT
Start: 2019-02-13 | End: 2019-02-13

## 2019-02-13 RX ORDER — LISINOPRIL AND HYDROCHLOROTHIAZIDE 20; 12.5 MG/1; MG/1
1 TABLET ORAL DAILY
Status: DISCONTINUED | OUTPATIENT
Start: 2019-02-14 | End: 2019-02-13 | Stop reason: SDUPTHER

## 2019-02-13 RX ORDER — CEFAZOLIN SODIUM/WATER 2 G/20 ML
2 SYRINGE (ML) INTRAVENOUS ONCE
Status: COMPLETED | OUTPATIENT
Start: 2019-02-13 | End: 2019-02-13

## 2019-02-13 RX ORDER — HYDROCODONE BITARTRATE AND ACETAMINOPHEN 5; 325 MG/1; MG/1
2 TABLET ORAL AS NEEDED
Status: DISCONTINUED | OUTPATIENT
Start: 2019-02-13 | End: 2019-02-13 | Stop reason: HOSPADM

## 2019-02-13 RX ORDER — SODIUM PHOSPHATE, DIBASIC AND SODIUM PHOSPHATE, MONOBASIC 7; 19 G/133ML; G/133ML
1 ENEMA RECTAL ONCE AS NEEDED
Status: DISCONTINUED | OUTPATIENT
Start: 2019-02-13 | End: 2019-02-14

## 2019-02-13 RX ORDER — HYDROMORPHONE HYDROCHLORIDE 1 MG/ML
0.4 INJECTION, SOLUTION INTRAMUSCULAR; INTRAVENOUS; SUBCUTANEOUS EVERY 5 MIN PRN
Status: DISCONTINUED | OUTPATIENT
Start: 2019-02-13 | End: 2019-02-13 | Stop reason: HOSPADM

## 2019-02-13 RX ORDER — TIZANIDINE 4 MG/1
4 TABLET ORAL 3 TIMES DAILY PRN
Status: DISCONTINUED | OUTPATIENT
Start: 2019-02-13 | End: 2019-02-14

## 2019-02-13 RX ORDER — OXYCODONE HYDROCHLORIDE 5 MG/1
5 TABLET ORAL EVERY 4 HOURS PRN
Status: DISCONTINUED | OUTPATIENT
Start: 2019-02-13 | End: 2019-02-14

## 2019-02-13 RX ORDER — DIPHENHYDRAMINE HYDROCHLORIDE 50 MG/ML
25 INJECTION INTRAMUSCULAR; INTRAVENOUS ONCE AS NEEDED
Status: ACTIVE | OUTPATIENT
Start: 2019-02-13 | End: 2019-02-13

## 2019-02-13 RX ORDER — DIPHENHYDRAMINE HYDROCHLORIDE 50 MG/ML
12.5 INJECTION INTRAMUSCULAR; INTRAVENOUS EVERY 4 HOURS PRN
Status: DISCONTINUED | OUTPATIENT
Start: 2019-02-13 | End: 2019-02-14

## 2019-02-13 RX ORDER — ZOLPIDEM TARTRATE 5 MG/1
5 TABLET ORAL NIGHTLY PRN
Status: DISCONTINUED | OUTPATIENT
Start: 2019-02-13 | End: 2019-02-14

## 2019-02-13 RX ORDER — SCOLOPAMINE TRANSDERMAL SYSTEM 1 MG/1
PATCH, EXTENDED RELEASE TRANSDERMAL
Status: DISCONTINUED
Start: 2019-02-13 | End: 2019-02-14

## 2019-02-13 RX ORDER — SODIUM CHLORIDE, SODIUM LACTATE, POTASSIUM CHLORIDE, CALCIUM CHLORIDE 600; 310; 30; 20 MG/100ML; MG/100ML; MG/100ML; MG/100ML
INJECTION, SOLUTION INTRAVENOUS CONTINUOUS
Status: DISCONTINUED | OUTPATIENT
Start: 2019-02-13 | End: 2019-02-14

## 2019-02-13 RX ORDER — ACETAMINOPHEN 325 MG/1
TABLET ORAL
Status: COMPLETED
Start: 2019-02-13 | End: 2019-02-13

## 2019-02-13 RX ORDER — OXYCODONE HYDROCHLORIDE 15 MG/1
15 TABLET ORAL EVERY 4 HOURS PRN
Status: DISCONTINUED | OUTPATIENT
Start: 2019-02-13 | End: 2019-02-14

## 2019-02-13 RX ORDER — ACETAMINOPHEN 500 MG
1000 TABLET ORAL ONCE
Status: DISCONTINUED | OUTPATIENT
Start: 2019-02-13 | End: 2019-02-13 | Stop reason: HOSPADM

## 2019-02-13 RX ORDER — CEFAZOLIN SODIUM/WATER 2 G/20 ML
SYRINGE (ML) INTRAVENOUS
Status: DISPENSED
Start: 2019-02-13 | End: 2019-02-13

## 2019-02-13 RX ORDER — ONDANSETRON 2 MG/ML
4 INJECTION INTRAMUSCULAR; INTRAVENOUS EVERY 4 HOURS PRN
Status: DISCONTINUED | OUTPATIENT
Start: 2019-02-13 | End: 2019-02-14

## 2019-02-13 RX ORDER — ACETAMINOPHEN 325 MG/1
650 TABLET ORAL 4 TIMES DAILY
Status: DISCONTINUED | OUTPATIENT
Start: 2019-02-13 | End: 2019-02-14

## 2019-02-13 RX ORDER — MIDAZOLAM HYDROCHLORIDE 1 MG/ML
1 INJECTION INTRAMUSCULAR; INTRAVENOUS EVERY 5 MIN PRN
Status: DISCONTINUED | OUTPATIENT
Start: 2019-02-13 | End: 2019-02-13 | Stop reason: HOSPADM

## 2019-02-13 RX ORDER — DEXAMETHASONE SODIUM PHOSPHATE 4 MG/ML
8 VIAL (ML) INJECTION AS NEEDED
Status: DISCONTINUED | OUTPATIENT
Start: 2019-02-13 | End: 2019-02-13 | Stop reason: HOSPADM

## 2019-02-13 RX ORDER — BISACODYL 10 MG
10 SUPPOSITORY, RECTAL RECTAL
Status: DISCONTINUED | OUTPATIENT
Start: 2019-02-13 | End: 2019-02-14

## 2019-02-13 RX ORDER — HYDROCODONE BITARTRATE AND ACETAMINOPHEN 5; 325 MG/1; MG/1
1 TABLET ORAL AS NEEDED
Status: DISCONTINUED | OUTPATIENT
Start: 2019-02-13 | End: 2019-02-13 | Stop reason: HOSPADM

## 2019-02-13 RX ORDER — ALPRAZOLAM 0.5 MG/1
0.5 TABLET ORAL NIGHTLY PRN
Status: DISCONTINUED | OUTPATIENT
Start: 2019-02-13 | End: 2019-02-14

## 2019-02-13 RX ORDER — OXYCODONE HCL 10 MG/1
TABLET, FILM COATED, EXTENDED RELEASE ORAL
Status: COMPLETED
Start: 2019-02-13 | End: 2019-02-13

## 2019-02-13 RX ORDER — MELATONIN
325
Status: DISCONTINUED | OUTPATIENT
Start: 2019-02-14 | End: 2019-02-14

## 2019-02-13 RX ORDER — HYDROMORPHONE HYDROCHLORIDE 1 MG/ML
0.2 INJECTION, SOLUTION INTRAMUSCULAR; INTRAVENOUS; SUBCUTANEOUS EVERY 2 HOUR PRN
Status: DISCONTINUED | OUTPATIENT
Start: 2019-02-13 | End: 2019-02-14

## 2019-02-13 RX ORDER — HYDROMORPHONE HYDROCHLORIDE 1 MG/ML
0.8 INJECTION, SOLUTION INTRAMUSCULAR; INTRAVENOUS; SUBCUTANEOUS EVERY 2 HOUR PRN
Status: DISCONTINUED | OUTPATIENT
Start: 2019-02-13 | End: 2019-02-14

## 2019-02-13 RX ORDER — OXYCODONE HYDROCHLORIDE 10 MG/1
10 TABLET ORAL EVERY 4 HOURS PRN
Status: DISCONTINUED | OUTPATIENT
Start: 2019-02-13 | End: 2019-02-14

## 2019-02-13 RX ORDER — MEPERIDINE HYDROCHLORIDE 25 MG/ML
12.5 INJECTION INTRAMUSCULAR; INTRAVENOUS; SUBCUTANEOUS AS NEEDED
Status: DISCONTINUED | OUTPATIENT
Start: 2019-02-13 | End: 2019-02-13 | Stop reason: HOSPADM

## 2019-02-13 NOTE — ANESTHESIA POSTPROCEDURE EVALUATION
[facility] Patient Status:  Hospital Outpatient Surgery   Age/Gender 62year old female MRN [id number]   Location [facility] Attending Andrew Rangel MD   Hosp Day # 0 PCP MD Kaylah Morales

## 2019-02-13 NOTE — OPERATIVE REPORT
DATE OF PROCEDURE:  2/13/2019  PREOPERATIVE DIAGNOSIS: Right knee osteoarthritis. POSTOPERATIVE DIAGNOSIS: Right knee osteoarthritis. PROCEDURE PERFORMED: Cemented right total knee arthroplasty. SURGEON:  Parish Nielson M.D.   FIRST ASSISTANT: Desmond Smith and extension, good tracking of the patella. Distal femoral condyle drill holes were made using the trial template. Final components the same size as the trials were utilized. Trial components were removed. Bony surfaces were irrigated and dried.  Final

## 2019-02-13 NOTE — PHYSICAL THERAPY NOTE
PHYSICAL THERAPY KNEE EVALUATION - INPATIENT     Room Number: 353/353-A  Evaluation Date: 2/13/2019  Type of Evaluation: Initial  Physician Order: PT Eval and Treat    Presenting Problem: s/p right TKA 2/13/19  Reason for Therapy: Mobility Dysfunction and Bearing Restriction: R lower extremity        R Lower Extremity: Weight Bearing as Tolerated       PAIN ASSESSMENT  Ratin  Location: right knee  Management Techniques:  Activity promotion    COGNITION  · Overall Cognitive Status:  WFL - within functiona therapy, ankle pumps, quad sets, goals for session. KI indicated at pt unable to perform ind slr. Pt transferred supine to sit with supervision only, good sitting balance, no dizziness, sitting bp 132/80.    Pt provided verbal instruction and demonstrati health PT    PLAN  PT Treatment Plan: Endurance; Energy conservation;Patient education;Gait training;Range of motion;Strengthening;Stair training;Transfer training  Rehab Potential : Good  Frequency (Obs): BID  Number of Visits to Meet Established Goals: 3

## 2019-02-13 NOTE — CONSULTS
[facility]  [facility] Patient Status:  Observation    5/10/1960 MRN [id number]   [facility] 3SW-A Attending Josué Deluca MD   Hosp Day # 0 PCP Chiki Barragan MD     Reason for consult: medical management Cholecalciferol (VITAMIN D) 1000 units Oral Tab Take 1,000 Units by mouth daily. Disp:  Rfl:    aspirin 325 MG Oral Tab Take 325 mg by mouth daily.  Disp:  Rfl:    LISINOPRIL-HYDROCHLOROTHIAZIDE 20-12.5 MG Oral Tab TAKE 1 TABLET BY MOUTH DAILY Disp: 90 ta results for input(s): WBC, HGB, MCV, PLT, BAND, INR in the last 168 hours.     Invalid input(s): LYM#, MONO#, BASOS#, EOSIN#    No results for input(s): GLU, BUN, CREATSERUM, GFRAA, GFRNAA, CA, ALB, NA, K, CL, CO2, ALKPHO, AST, ALT, BILT, TP in the last 168

## 2019-02-14 VITALS
OXYGEN SATURATION: 95 % | DIASTOLIC BLOOD PRESSURE: 59 MMHG | WEIGHT: 235.69 LBS | BODY MASS INDEX: 43.37 KG/M2 | RESPIRATION RATE: 18 BRPM | HEIGHT: 62 IN | SYSTOLIC BLOOD PRESSURE: 130 MMHG | TEMPERATURE: 98 F | HEART RATE: 75 BPM

## 2019-02-14 LAB
DEPRECATED RDW RBC AUTO: 41.5 FL (ref 35.1–46.3)
ERYTHROCYTE [DISTWIDTH] IN BLOOD BY AUTOMATED COUNT: 13.7 % (ref 11–15)
HCT VFR BLD AUTO: 34.8 % (ref 35–48)
HGB BLD-MCNC: 11.7 G/DL (ref 12–16)
MCH RBC QN AUTO: 28.1 PG (ref 26–34)
MCHC RBC AUTO-ENTMCNC: 33.6 G/DL (ref 31–37)
MCV RBC AUTO: 83.5 FL (ref 80–100)
PLATELET # BLD AUTO: 295 10(3)UL (ref 150–450)
RBC # BLD AUTO: 4.17 X10(6)UL (ref 3.8–5.3)
WBC # BLD AUTO: 12.6 X10(3) UL (ref 4–11)

## 2019-02-14 PROCEDURE — 99225 SUBSEQUENT OBSERVATION CARE: CPT | Performed by: HOSPITALIST

## 2019-02-14 RX ORDER — HYDROCODONE BITARTRATE AND ACETAMINOPHEN 10; 325 MG/1; MG/1
1 TABLET ORAL EVERY 4 HOURS PRN
Status: DISCONTINUED | OUTPATIENT
Start: 2019-02-14 | End: 2019-02-14

## 2019-02-14 RX ORDER — HYDROCODONE BITARTRATE AND ACETAMINOPHEN 10; 325 MG/1; MG/1
2 TABLET ORAL EVERY 4 HOURS PRN
Status: DISCONTINUED | OUTPATIENT
Start: 2019-02-14 | End: 2019-02-14

## 2019-02-14 NOTE — CM/SW NOTE
4:03pm  MSW called pt's room, no answer. Per chart [facility]c inst updated.

## 2019-02-14 NOTE — PLAN OF CARE
Impaired Functional Mobility    • Achieve highest/safest level of mobility/gait Progressing        PAIN - ADULT    • Verbalizes/displays adequate comfort level or patient's stated pain goal Progressing            A&O x 4. VSS. On RA/O2 PRN.  RLE pain well c

## 2019-02-14 NOTE — HOME CARE LIAISON
MET WITH PTNT AND OFFERED CHOICE  OF AGENCIES. PTNT AGREEABLE TO [facility]. MET WITH PTNT TO DISCUSS HOME HEALTH SERVICES AND COVERAGE CRITERIA. PTNT AGREEABLE TO [facility] Tate. PTNT GIVEN RESIDENTIAL BROCHURE.  RESIDENTIAL WITH PROVIDE SN/PT ON DISC

## 2019-02-14 NOTE — PROGRESS NOTES
[facility] Patient Status:  Observation    5/10/1960 MRN [id number]   [facility] 3SW-A Attending Pascale Taylor MD   Hosp Day # 0 PCP Kamari Bush MD     Subjective:  S/P RIGHT Total Knee Arthroplasty  Systemic

## 2019-02-14 NOTE — PHYSICAL THERAPY NOTE
PHYSICAL THERAPY KNEE TREATMENT NOTE - INPATIENT     Room Number: 353/353-A     Session: 1&2   Number of Visits to Meet Established Goals: 3    Presenting Problem: s/p right TKA 2/13/19    Problem List  Active Problems:    Primary osteoarthritis of right (including adjusting bedclothes, sheets and blankets)?: None   -   Sitting down on and standing up from a chair with arms (e.g., wheelchair, bedside commode, etc.): None   -   Moving from lying on back to sitting on the side of the bed?: None   How much he sequencing for ambulation on stairs, amb up and down 4 stairs with railing and straight cane with supervision assist.   Pt educated in and  perform simulated car transfer with supervision.       Exercises AM Session PM Session   Ankle Pumps 10 reps 15 reps demonstrate transfers Sit to/from Stand at assistance level: supervision, met 2/14/19    Goal #3     Patient is able to ambulate 300 feet with assistive device at assistance level: modified  independent    Goal #4     Patient will negotiate 4 stairs/one cu

## 2019-02-14 NOTE — PROGRESS NOTES
[facility]  Progress Note     Jose Rafael Mclean Patient Status:  Observation    5/10/1960 MRN [id number]   [facility] 3SW-A Attending Clarita Gomes MD   Hosp Day # 0 PCP Durand Sandifer, MD     Chief Complaint: medical management only)   Oral Daily       ASSESSMENT / PLAN:     1. Osteoarthritis. S/p R knee arthroplasty 2/13   1. Ortho  2. PT/OT  3. Pain control   2. HTN         1. Resume home meds tomorrow  3. Fibromyalgia  4. Obesity, BMI 43  5. GERD PPI    Plan of care:    Raiza Smyth with

## 2019-02-14 NOTE — OCCUPATIONAL THERAPY NOTE
OCCUPATIONAL THERAPY QUICK EVALUATION - INPATIENT    Room Number: 353/353-A  Evaluation Date: 2/14/2019     Type of Evaluation: Quick Eval  Presenting Problem: (R TKA)    Physician Order: IP Consult to Occupational Therapy  Reason for Therapy:  ADL/IADL Dy immobilizer(able to SLR, not yet DC'd by PT in a.m.)  Fall Risk: Standard fall risk    WEIGHT BEARING RESTRICTION  Weight Bearing Restriction: R lower extremity        R Lower Extremity: Weight Bearing as Tolerated       PAIN ASSESSMENT  Ratin  Locatio for safety. Bed mobility in/out completed with mod I . Understanding was voiced. Patient End of Session: Up in chair;Needs met;Call light within reach;RN aware of session/findings; All patient questions and concerns addressed;SCDs in place; Ice applied Patient/Caregiver able to demonstrate safety with ADLS: safely with RW

## 2019-02-18 ENCOUNTER — TELEPHONE (OUTPATIENT)
Dept: INTERNAL MEDICINE CLINIC | Facility: CLINIC | Age: 59
End: 2019-02-18

## 2019-02-19 NOTE — TELEPHONE ENCOUNTER
Pt's  returned call ([phone] Nivia Moeller per HIPAA). Scheduled HFU for 2/27 with CB. He could read back correctly and verbalized understanding.

## 2019-02-27 ENCOUNTER — PATIENT MESSAGE (OUTPATIENT)
Dept: CASE MANAGEMENT | Age: 59
End: 2019-02-27

## 2019-02-27 ENCOUNTER — OFFICE VISIT (OUTPATIENT)
Dept: INTERNAL MEDICINE CLINIC | Facility: CLINIC | Age: 59
End: 2019-02-27
Payer: COMMERCIAL

## 2019-02-27 ENCOUNTER — HOSPITAL ENCOUNTER (OUTPATIENT)
Dept: ULTRASOUND IMAGING | Facility: HOSPITAL | Age: 59
Discharge: HOME | End: 2019-02-27
Attending: PHYSICIAN ASSISTANT
Payer: COMMERCIAL

## 2019-02-27 ENCOUNTER — PATIENT OUTREACH (OUTPATIENT)
Dept: CASE MANAGEMENT | Age: 59
End: 2019-02-27

## 2019-02-27 VITALS
HEIGHT: 62 IN | TEMPERATURE: 98 F | SYSTOLIC BLOOD PRESSURE: 126 MMHG | RESPIRATION RATE: 16 BRPM | DIASTOLIC BLOOD PRESSURE: 82 MMHG | HEART RATE: 76 BPM | BODY MASS INDEX: 43 KG/M2

## 2019-02-27 DIAGNOSIS — Z96.651 STATUS POST TOTAL RIGHT KNEE REPLACEMENT: ICD-10-CM

## 2019-02-27 DIAGNOSIS — M79.661 RIGHT CALF PAIN: Primary | ICD-10-CM

## 2019-02-27 DIAGNOSIS — M79.661 RIGHT CALF PAIN: ICD-10-CM

## 2019-02-27 PROCEDURE — 93971 EXTREMITY STUDY: CPT | Performed by: PHYSICIAN ASSISTANT

## 2019-02-27 PROCEDURE — 99214 OFFICE O/P EST MOD 30 MIN: CPT | Performed by: PHYSICIAN ASSISTANT

## 2019-02-27 PROCEDURE — 1111F DSCHRG MED/CURRENT MED MERGE: CPT | Performed by: PHYSICIAN ASSISTANT

## 2019-02-27 RX ORDER — ASPIRIN 325 MG
325 TABLET ORAL 2 TIMES DAILY
COMMUNITY
End: 2021-06-08

## 2019-02-27 NOTE — PROGRESS NOTES
Result reviewed with pt's  while pt was still in radiology. Negative for DVT. Encouraged elevation for leg and calf swelling. Has f/u appt Friday with jennifer.

## 2019-02-27 NOTE — PROGRESS NOTES
Patient presents with:  Hospital F/U: Pt is here for a hospital f/u on R knee replacement. Pt states she is doing well she is having a lot of pain. LB-rm 3      HPI:  Pt presents for hospital follow up after R knee TKA 2 weeks ago.   Pt states she continues Take 325 mg by mouth 2 (two) times daily. Disp:  Rfl:    HYDROcodone-acetaminophen (NORCO)  MG Oral Tab Take 1-2 tablets by mouth every 4 (four) hours as needed.  Disp: 60 tablet Rfl: 0   Metaxalone 800 MG Oral Tab Take 0.5 tablets (400 mg total) by m post-procedure. She agrees to get US today to r/o DVT. Status post total right knee replacement - completed HHP today. Seeing Ortho this Friday. On Norco for pain. No orders of the defined types were placed in this encounter.       Meds & Refills for

## 2019-03-17 PROBLEM — M25.561 ACUTE PAIN OF RIGHT KNEE: Status: ACTIVE | Noted: 2019-03-17

## 2019-03-20 NOTE — PROGRESS NOTES
Tried to call the pt for condition update since surgery, no answer after multiple rings. Recording came up stating no one is available to take your call, thank you for calling. Call then disconnected.

## 2019-03-27 NOTE — PROGRESS NOTES
Tried to call the pt for condition update, phone rang multiple times and then stated \" no one is available to take your call, thank you for calling. \" The call then disconnected.

## 2019-06-04 RX ORDER — LISINOPRIL AND HYDROCHLOROTHIAZIDE 20; 12.5 MG/1; MG/1
1 TABLET ORAL DAILY
Qty: 90 TABLET | Refills: 0 | Status: SHIPPED | OUTPATIENT
Start: 2019-06-04 | End: 2019-08-30

## 2019-06-04 NOTE — TELEPHONE ENCOUNTER
Last Ov: 2/27/19, CB, acute  Upcoming appt: no upcoming appt  Last labs: CBC, Lipid, CMP, Vit B12 6/13/18  Last Rx: lisinopril-HCTZ 20/12.5mg, #90, 1 R, 10/29/18    Per Protocol, pass.  Rx refill sent to pharmacy

## 2019-08-30 RX ORDER — LISINOPRIL AND HYDROCHLOROTHIAZIDE 20; 12.5 MG/1; MG/1
1 TABLET ORAL DAILY
Qty: 90 TABLET | Refills: 0 | Status: SHIPPED | OUTPATIENT
Start: 2019-08-30 | End: 2019-11-29

## 2019-08-30 NOTE — TELEPHONE ENCOUNTER
Protocol failed due to Appointment in past 6 or next 3 months    Please advise,    LOV:2/27/19 cb  FOV:none on file   LAST RX:6/4/19 20-12.5 mg take 1 tab daily 90 tabs 0 refills   LAST LABS:2/14/19 cbc  PER PROTOCOL: to provider

## 2019-09-10 ENCOUNTER — TELEPHONE (OUTPATIENT)
Dept: INTERNAL MEDICINE CLINIC | Facility: CLINIC | Age: 59
End: 2019-09-10

## 2019-09-10 DIAGNOSIS — D51.0 PERNICIOUS ANEMIA: Primary | ICD-10-CM

## 2019-09-10 DIAGNOSIS — I10 ESSENTIAL HYPERTENSION: ICD-10-CM

## 2019-09-10 DIAGNOSIS — E78.00 HYPERCHOLESTEREMIA: ICD-10-CM

## 2019-09-10 DIAGNOSIS — R79.89 ABNORMAL CBC: ICD-10-CM

## 2019-09-10 NOTE — TELEPHONE ENCOUNTER
Cpe   Future Appointments   Date Time Provider Dianne Yojana   9/19/2019 11:00 AM Andra Manuel PA-C [facility]     Orders to THE [facility].   Pt aware to fast.  No call back required

## 2019-09-16 ENCOUNTER — LAB ENCOUNTER (OUTPATIENT)
Dept: LAB | Age: 59
End: 2019-09-16
Attending: PHYSICIAN ASSISTANT
Payer: COMMERCIAL

## 2019-09-16 DIAGNOSIS — I10 ESSENTIAL HYPERTENSION: ICD-10-CM

## 2019-09-16 DIAGNOSIS — E78.00 HYPERCHOLESTEREMIA: ICD-10-CM

## 2019-09-16 DIAGNOSIS — R79.89 ABNORMAL CBC: ICD-10-CM

## 2019-09-16 DIAGNOSIS — R73.9 HYPERGLYCEMIA: ICD-10-CM

## 2019-09-16 DIAGNOSIS — D51.0 PERNICIOUS ANEMIA: ICD-10-CM

## 2019-09-16 LAB
ALBUMIN SERPL-MCNC: 3.6 G/DL (ref 3.4–5)
ALBUMIN/GLOB SERPL: 0.9 {RATIO} (ref 1–2)
ALP LIVER SERPL-CCNC: 96 U/L (ref 46–118)
ALT SERPL-CCNC: 40 U/L (ref 13–56)
ANION GAP SERPL CALC-SCNC: 9 MMOL/L (ref 0–18)
AST SERPL-CCNC: 26 U/L (ref 15–37)
BASOPHILS # BLD AUTO: 0.05 X10(3) UL (ref 0–0.2)
BASOPHILS NFR BLD AUTO: 0.5 %
BILIRUB SERPL-MCNC: 0.4 MG/DL (ref 0.1–2)
BUN BLD-MCNC: 20 MG/DL (ref 7–18)
BUN/CREAT SERPL: 24.4 (ref 10–20)
CALCIUM BLD-MCNC: 9 MG/DL (ref 8.5–10.1)
CHLORIDE SERPL-SCNC: 104 MMOL/L (ref 98–112)
CHOLEST SMN-MCNC: 233 MG/DL (ref ?–200)
CO2 SERPL-SCNC: 26 MMOL/L (ref 21–32)
CREAT BLD-MCNC: 0.82 MG/DL (ref 0.55–1.02)
DEPRECATED RDW RBC AUTO: 43.4 FL (ref 35.1–46.3)
EOSINOPHIL # BLD AUTO: 0.2 X10(3) UL (ref 0–0.7)
EOSINOPHIL NFR BLD AUTO: 2.1 %
ERYTHROCYTE [DISTWIDTH] IN BLOOD BY AUTOMATED COUNT: 14 % (ref 11–15)
GLOBULIN PLAS-MCNC: 4.1 G/DL (ref 2.8–4.4)
GLUCOSE BLD-MCNC: 118 MG/DL (ref 70–99)
HCT VFR BLD AUTO: 41.4 % (ref 35–48)
HDLC SERPL-MCNC: 47 MG/DL (ref 40–59)
HGB BLD-MCNC: 13.7 G/DL (ref 12–16)
IMM GRANULOCYTES # BLD AUTO: 0.04 X10(3) UL (ref 0–1)
IMM GRANULOCYTES NFR BLD: 0.4 %
LDLC SERPL CALC-MCNC: 160 MG/DL (ref ?–100)
LYMPHOCYTES # BLD AUTO: 3.5 X10(3) UL (ref 1–4)
LYMPHOCYTES NFR BLD AUTO: 37.6 %
M PROTEIN MFR SERPL ELPH: 7.7 G/DL (ref 6.4–8.2)
MCH RBC QN AUTO: 27.9 PG (ref 26–34)
MCHC RBC AUTO-ENTMCNC: 33.1 G/DL (ref 31–37)
MCV RBC AUTO: 84.3 FL (ref 80–100)
MONOCYTES # BLD AUTO: 0.75 X10(3) UL (ref 0.1–1)
MONOCYTES NFR BLD AUTO: 8 %
NEUTROPHILS # BLD AUTO: 4.78 X10 (3) UL (ref 1.5–7.7)
NEUTROPHILS # BLD AUTO: 4.78 X10(3) UL (ref 1.5–7.7)
NEUTROPHILS NFR BLD AUTO: 51.4 %
NONHDLC SERPL-MCNC: 186 MG/DL (ref ?–130)
OSMOLALITY SERPL CALC.SUM OF ELEC: 292 MOSM/KG (ref 275–295)
PLATELET # BLD AUTO: 328 10(3)UL (ref 150–450)
POTASSIUM SERPL-SCNC: 4.3 MMOL/L (ref 3.5–5.1)
RBC # BLD AUTO: 4.91 X10(6)UL (ref 3.8–5.3)
SODIUM SERPL-SCNC: 139 MMOL/L (ref 136–145)
TRIGL SERPL-MCNC: 132 MG/DL (ref 30–149)
TSI SER-ACNC: 0.63 MIU/ML (ref 0.36–3.74)
VLDLC SERPL CALC-MCNC: 26 MG/DL (ref 0–30)
WBC # BLD AUTO: 9.3 X10(3) UL (ref 4–11)

## 2019-09-16 PROCEDURE — 80061 LIPID PANEL: CPT | Performed by: PHYSICIAN ASSISTANT

## 2019-09-16 PROCEDURE — 83036 HEMOGLOBIN GLYCOSYLATED A1C: CPT | Performed by: PHYSICIAN ASSISTANT

## 2019-09-16 PROCEDURE — 80050 GENERAL HEALTH PANEL: CPT | Performed by: PHYSICIAN ASSISTANT

## 2019-09-17 DIAGNOSIS — R73.9 HYPERGLYCEMIA: Primary | ICD-10-CM

## 2019-09-17 LAB
EST. AVERAGE GLUCOSE BLD GHB EST-MCNC: 134 MG/DL (ref 68–126)
HBA1C MFR BLD HPLC: 6.3 % (ref ?–5.7)

## 2019-09-19 ENCOUNTER — OFFICE VISIT (OUTPATIENT)
Dept: INTERNAL MEDICINE CLINIC | Facility: CLINIC | Age: 59
End: 2019-09-19
Payer: COMMERCIAL

## 2019-09-19 VITALS
RESPIRATION RATE: 14 BRPM | DIASTOLIC BLOOD PRESSURE: 70 MMHG | BODY MASS INDEX: 48.1 KG/M2 | WEIGHT: 245 LBS | SYSTOLIC BLOOD PRESSURE: 100 MMHG | HEART RATE: 68 BPM | TEMPERATURE: 98 F | HEIGHT: 60 IN

## 2019-09-19 DIAGNOSIS — E78.00 HYPERCHOLESTEREMIA: ICD-10-CM

## 2019-09-19 DIAGNOSIS — Z12.31 VISIT FOR SCREENING MAMMOGRAM: ICD-10-CM

## 2019-09-19 DIAGNOSIS — R73.03 PRE-DIABETES: ICD-10-CM

## 2019-09-19 DIAGNOSIS — I10 ESSENTIAL HYPERTENSION: ICD-10-CM

## 2019-09-19 DIAGNOSIS — D51.0 PERNICIOUS ANEMIA: ICD-10-CM

## 2019-09-19 DIAGNOSIS — G47.00 INSOMNIA, UNSPECIFIED TYPE: ICD-10-CM

## 2019-09-19 DIAGNOSIS — E66.01 MORBID OBESITY WITH BMI OF 45.0-49.9, ADULT (HCC): ICD-10-CM

## 2019-09-19 DIAGNOSIS — Z00.00 ANNUAL PHYSICAL EXAM: Primary | ICD-10-CM

## 2019-09-19 DIAGNOSIS — Z78.0 POST-MENOPAUSE: ICD-10-CM

## 2019-09-19 PROCEDURE — 99396 PREV VISIT EST AGE 40-64: CPT | Performed by: PHYSICIAN ASSISTANT

## 2019-09-19 PROCEDURE — 99212 OFFICE O/P EST SF 10 MIN: CPT | Performed by: PHYSICIAN ASSISTANT

## 2019-09-19 RX ORDER — IBUPROFEN 800 MG/1
800 TABLET ORAL DAILY
Refills: 2 | COMMUNITY
Start: 2019-04-04 | End: 2020-09-28 | Stop reason: ALTCHOICE

## 2019-09-19 RX ORDER — ZOLPIDEM TARTRATE 5 MG/1
5 TABLET ORAL NIGHTLY PRN
Qty: 30 TABLET | Refills: 3 | Status: SHIPPED | OUTPATIENT
Start: 2019-09-19 | End: 2019-10-31

## 2019-09-19 NOTE — PROGRESS NOTES
Patient presents with:  Physical      HPI:  Pt presents for annual physical.    Pt had R knee replacement. Has completed therapy, still with pain, a new and different pain.   Plan is for dental procedure (possible filling) soon and reports her dentist told pain\") in her head one night when having trouble sleeping. No assoc sxs, lasted only a second, has not recurred. Hematological: Negative for adenopathy. Does not bruise/bleed easily. Psychiatric/Behavioral: The patient is not nervous/anxious.  No depre • Cancer Father         lung   • Cancer Brother         lung     Social History    Tobacco Use      Smoking status: Never Smoker      Smokeless tobacco: Never Used    Alcohol use: No      Alcohol/week: 0.0 standard drinks      Frequency: Never    Drug us moist.   Eyes: Conjunctivae are normal. PERRLA. No scleral icterus. Neck: Normal range of motion. Neck supple. No carotid bruits bilaterally. No edema present. Cardiovascular: Normal rate, regular rhythm and intact distal pulses.   No murmur, rubs or gal - 5.0 g/dL Final   • Globulin  09/16/2019 4.1  2.8 - 4.4 g/dL Final   • A/G Ratio 09/16/2019 0.9* 1.0 - 2.0 Final   • Cholesterol, Total 09/16/2019 233* <200 mg/dL Final   • HDL Cholesterol 09/16/2019 47  40 - 59 mg/dL Final   • Triglycerides 09/16/2019 13 Encouraged her to return for RN visit. Mammo due in November. Colonoscopy UTD. Essential hypertension - controlled on current meds. Continue. Hypercholesteremia - Uncontrolled with high LDL.   Pt willing to see Cardiology to discuss as discussed no the plan.

## 2019-09-19 NOTE — PATIENT INSTRUCTIONS
Antimicrobial prophylaxis for dental procedures  · [address] on DIRECT clinical practice guideline on use of prophylactic antibiotics prior to dental procedures in patients with prosthetic joints  ? in general, for antonette

## 2019-10-09 ENCOUNTER — OFFICE VISIT (OUTPATIENT)
Dept: INTERNAL MEDICINE CLINIC | Facility: CLINIC | Age: 59
End: 2019-10-09
Payer: COMMERCIAL

## 2019-10-09 VITALS
HEART RATE: 82 BPM | RESPIRATION RATE: 16 BRPM | DIASTOLIC BLOOD PRESSURE: 70 MMHG | BODY MASS INDEX: 46.53 KG/M2 | SYSTOLIC BLOOD PRESSURE: 126 MMHG | TEMPERATURE: 98 F | HEIGHT: 60 IN | WEIGHT: 237 LBS | OXYGEN SATURATION: 98 %

## 2019-10-09 DIAGNOSIS — R19.7 DIARRHEA, UNSPECIFIED TYPE: ICD-10-CM

## 2019-10-09 DIAGNOSIS — G47.00 INSOMNIA, UNSPECIFIED TYPE: ICD-10-CM

## 2019-10-09 DIAGNOSIS — R11.2 NON-INTRACTABLE VOMITING WITH NAUSEA, UNSPECIFIED VOMITING TYPE: Primary | ICD-10-CM

## 2019-10-09 PROCEDURE — 99214 OFFICE O/P EST MOD 30 MIN: CPT | Performed by: PHYSICIAN ASSISTANT

## 2019-10-09 RX ORDER — ZOLPIDEM TARTRATE 6.25 MG/1
6.25 TABLET, FILM COATED, EXTENDED RELEASE ORAL NIGHTLY PRN
Qty: 30 TABLET | Refills: 1 | Status: SHIPPED | OUTPATIENT
Start: 2019-10-09

## 2019-10-09 NOTE — PROGRESS NOTES
No chief complaint on file. HPI:  Pt presents for follow up after ER visit in [facility] while she was there for n/v, diarrhea and dehydration.   Pt was in [place] when she started to have diarrhea the day before leaving for [facility] (where she has family and Class 3 severe obesity due to excess calories without serious comorbidity with body mass index (BMI) of 40.0 to 44.9 in adult [facility])     Acute pain of right knee     Pre-diabetes        Current Outpatient Medications:  Zolpidem Tartrate ER 6.25 MG Oral Ta normal. + mild RUQ and RLQ. No masses, no organomegaly or hernias. No rebound or guarding. Skin: Skin is warm and dry. No rash noted. No erythema. No pallor.        A/P:    Non-intractable vomiting with nausea, unspecified vomiting type  (primary encoun

## 2019-10-10 ENCOUNTER — LAB ENCOUNTER (OUTPATIENT)
Dept: LAB | Age: 59
End: 2019-10-10
Attending: PHYSICIAN ASSISTANT
Payer: COMMERCIAL

## 2019-10-10 DIAGNOSIS — R11.2 NON-INTRACTABLE VOMITING WITH NAUSEA, UNSPECIFIED VOMITING TYPE: ICD-10-CM

## 2019-10-10 DIAGNOSIS — D51.0 PERNICIOUS ANEMIA: ICD-10-CM

## 2019-10-10 DIAGNOSIS — R19.7 DIARRHEA, UNSPECIFIED TYPE: ICD-10-CM

## 2019-10-10 PROCEDURE — 80053 COMPREHEN METABOLIC PANEL: CPT | Performed by: PHYSICIAN ASSISTANT

## 2019-10-10 PROCEDURE — 85025 COMPLETE CBC W/AUTO DIFF WBC: CPT | Performed by: PHYSICIAN ASSISTANT

## 2019-10-10 PROCEDURE — 82607 VITAMIN B-12: CPT | Performed by: PHYSICIAN ASSISTANT

## 2019-10-31 ENCOUNTER — OFFICE VISIT (OUTPATIENT)
Dept: INTERNAL MEDICINE CLINIC | Facility: CLINIC | Age: 59
End: 2019-10-31
Payer: COMMERCIAL

## 2019-10-31 VITALS
BODY MASS INDEX: 46.72 KG/M2 | TEMPERATURE: 98 F | HEIGHT: 60 IN | DIASTOLIC BLOOD PRESSURE: 70 MMHG | HEART RATE: 102 BPM | RESPIRATION RATE: 18 BRPM | SYSTOLIC BLOOD PRESSURE: 126 MMHG | WEIGHT: 238 LBS

## 2019-10-31 DIAGNOSIS — J01.10 ACUTE NON-RECURRENT FRONTAL SINUSITIS: ICD-10-CM

## 2019-10-31 DIAGNOSIS — R07.89 CHEST WALL PAIN: Primary | ICD-10-CM

## 2019-10-31 PROCEDURE — 99213 OFFICE O/P EST LOW 20 MIN: CPT | Performed by: PHYSICIAN ASSISTANT

## 2019-10-31 RX ORDER — PREDNISONE 20 MG/1
20 TABLET ORAL DAILY
Qty: 5 TABLET | Refills: 0 | Status: SHIPPED | OUTPATIENT
Start: 2019-10-31 | End: 2019-11-05

## 2019-10-31 RX ORDER — AMOXICILLIN AND CLAVULANATE POTASSIUM 875; 125 MG/1; MG/1
1 TABLET, FILM COATED ORAL 2 TIMES DAILY
Qty: 20 TABLET | Refills: 0 | Status: SHIPPED | OUTPATIENT
Start: 2019-10-31 | End: 2019-11-10

## 2019-10-31 NOTE — PROGRESS NOTES
Patient presents with: Follow - Up: rm 2 DO: here to follow-up from [address] [place] [address] 10/9/19- seen for vomiting, nausea, diarrhea. Pt c/o side pain on LT side      HPI:  Pt presents for follow up of abd pain, n/v/d.  N/v/d have resolved.   She reports that she has pain (600 mg total) by mouth every 8 (eight) hours as needed for Pain., Disp: 90 tablet, Rfl: 1  ibuprofen 800 MG Oral Tab, Take 800 mg by mouth daily. , Disp: , Rfl: 2  LISINOPRIL-HYDROCHLOROTHIAZIDE 20-12.5 MG Oral Tab, TAKE 1 TABLET BY MOUTH DAILY, Disp: 90 t etiologies. Acute non-recurrent frontal sinusitis - augmentin X 10 days. No orders of the defined types were placed in this encounter.       Meds & Refills for this Visit:  Requested Prescriptions     Signed Prescriptions Disp Refills   • Amoxicillin-

## 2019-11-20 ENCOUNTER — HOSPITAL ENCOUNTER (OUTPATIENT)
Dept: MAMMOGRAPHY | Age: 59
Discharge: HOME | End: 2019-11-20
Attending: PHYSICIAN ASSISTANT
Payer: COMMERCIAL

## 2019-11-20 ENCOUNTER — HOSPITAL ENCOUNTER (OUTPATIENT)
Dept: BONE DENSITY | Age: 59
Discharge: HOME | End: 2019-11-20
Attending: PHYSICIAN ASSISTANT
Payer: COMMERCIAL

## 2019-11-20 DIAGNOSIS — Z12.31 VISIT FOR SCREENING MAMMOGRAM: ICD-10-CM

## 2019-11-20 DIAGNOSIS — Z78.0 POST-MENOPAUSE: ICD-10-CM

## 2019-11-20 PROCEDURE — 77080 DXA BONE DENSITY AXIAL: CPT | Performed by: PHYSICIAN ASSISTANT

## 2019-11-20 PROCEDURE — 77063 BREAST TOMOSYNTHESIS BI: CPT | Performed by: PHYSICIAN ASSISTANT

## 2019-11-20 PROCEDURE — 77067 SCR MAMMO BI INCL CAD: CPT | Performed by: PHYSICIAN ASSISTANT

## 2019-12-02 RX ORDER — LISINOPRIL AND HYDROCHLOROTHIAZIDE 20; 12.5 MG/1; MG/1
1 TABLET ORAL DAILY
Qty: 90 TABLET | Refills: 0 | Status: SHIPPED | OUTPATIENT
Start: 2019-12-02 | End: 2020-02-29

## 2020-02-29 RX ORDER — LISINOPRIL AND HYDROCHLOROTHIAZIDE 20; 12.5 MG/1; MG/1
1 TABLET ORAL DAILY
Qty: 90 TABLET | Refills: 0 | Status: SHIPPED | OUTPATIENT
Start: 2020-02-29 | End: 2020-06-02

## 2020-06-02 RX ORDER — LISINOPRIL AND HYDROCHLOROTHIAZIDE 20; 12.5 MG/1; MG/1
1 TABLET ORAL DAILY
Qty: 90 TABLET | Refills: 0 | Status: SHIPPED | OUTPATIENT
Start: 2020-06-02 | End: 2020-08-31

## 2020-06-02 NOTE — TELEPHONE ENCOUNTER
Last OV 10.31.19 w/ CB (f/up)  Last PE 9.19.19   Last REFILL 2.29.20 Lisinopril-HCTZ 20-12.5mg #90 0R   Last LABS  10.10.19 CMP, CBC, VitD    No future appointments. Per PROTOCOL?  FAILED-no appt in last 6 months or next 3 months     Please Advise

## 2020-06-02 NOTE — TELEPHONE ENCOUNTER
Pt is due for f/u (multiple issues - insomnia, HTN, pre-diabetes, weight etc). I have ordered labs for her to do prior to her visit.   If she prefers virtual visit she will need to have a BP cuf and be checking her BP at home, otherwise I would suggest in

## 2020-07-06 ENCOUNTER — TELEPHONE (OUTPATIENT)
Dept: INTERNAL MEDICINE CLINIC | Facility: CLINIC | Age: 60
End: 2020-07-06

## 2020-07-06 NOTE — TELEPHONE ENCOUNTER
[employer] requesting any and all medical records. Sent to scan stat for further processing. Copy sent to scanning.

## 2020-08-18 ENCOUNTER — TELEPHONE (OUTPATIENT)
Dept: INTERNAL MEDICINE CLINIC | Facility: CLINIC | Age: 60
End: 2020-08-18

## 2020-08-18 NOTE — TELEPHONE ENCOUNTER
Annual Physical   Future Appointments   Date Time Provider Dianne Yojana   9/28/2020  2:15 PM Nallely Manuel MD [facility]       Lab is  [address]   Pt aware to fast and to complete labs no sooner than 2 weeks prior to physical   No call back

## 2020-08-31 RX ORDER — LISINOPRIL AND HYDROCHLOROTHIAZIDE 20; 12.5 MG/1; MG/1
1 TABLET ORAL DAILY
Qty: 90 TABLET | Refills: 0 | Status: SHIPPED | OUTPATIENT
Start: 2020-08-31 | End: 2020-11-30

## 2020-09-17 ENCOUNTER — TELEPHONE (OUTPATIENT)
Dept: INTERNAL MEDICINE CLINIC | Facility: CLINIC | Age: 60
End: 2020-09-17

## 2020-09-17 ENCOUNTER — LAB ENCOUNTER (OUTPATIENT)
Dept: LAB | Age: 60
End: 2020-09-17
Attending: INTERNAL MEDICINE
Payer: COMMERCIAL

## 2020-09-17 DIAGNOSIS — Z13.29 SCREENING FOR THYROID DISORDER: ICD-10-CM

## 2020-09-17 DIAGNOSIS — Z13.29 SCREENING FOR ENDOCRINE, METABOLIC AND IMMUNITY DISORDER: ICD-10-CM

## 2020-09-17 DIAGNOSIS — Z13.0 SCREENING FOR BLOOD DISEASE: ICD-10-CM

## 2020-09-17 DIAGNOSIS — Z13.228 SCREENING FOR ENDOCRINE, METABOLIC AND IMMUNITY DISORDER: ICD-10-CM

## 2020-09-17 DIAGNOSIS — Z13.0 SCREENING FOR ENDOCRINE, METABOLIC AND IMMUNITY DISORDER: ICD-10-CM

## 2020-09-17 DIAGNOSIS — Z00.00 ANNUAL PHYSICAL EXAM: ICD-10-CM

## 2020-09-17 DIAGNOSIS — Z13.220 SCREENING, LIPID: ICD-10-CM

## 2020-09-17 DIAGNOSIS — Z00.00 ANNUAL PHYSICAL EXAM: Primary | ICD-10-CM

## 2020-09-17 LAB
ALBUMIN SERPL-MCNC: 3.4 G/DL (ref 3.4–5)
ALBUMIN/GLOB SERPL: 0.8 {RATIO} (ref 1–2)
ALP LIVER SERPL-CCNC: 94 U/L (ref 46–118)
ALT SERPL-CCNC: 37 U/L (ref 13–56)
ANION GAP SERPL CALC-SCNC: 5 MMOL/L (ref 0–18)
AST SERPL-CCNC: 23 U/L (ref 15–37)
BASOPHILS # BLD AUTO: 0.07 X10(3) UL (ref 0–0.2)
BASOPHILS NFR BLD AUTO: 0.7 %
BILIRUB SERPL-MCNC: 0.4 MG/DL (ref 0.1–2)
BUN BLD-MCNC: 18 MG/DL (ref 7–18)
BUN/CREAT SERPL: 24.3 (ref 10–20)
CALCIUM BLD-MCNC: 9.3 MG/DL (ref 8.5–10.1)
CHLORIDE SERPL-SCNC: 105 MMOL/L (ref 98–112)
CHOLEST SMN-MCNC: 223 MG/DL (ref ?–200)
CO2 SERPL-SCNC: 28 MMOL/L (ref 21–32)
CREAT BLD-MCNC: 0.74 MG/DL (ref 0.55–1.02)
DEPRECATED RDW RBC AUTO: 42.9 FL (ref 35.1–46.3)
EOSINOPHIL # BLD AUTO: 0.27 X10(3) UL (ref 0–0.7)
EOSINOPHIL NFR BLD AUTO: 2.7 %
ERYTHROCYTE [DISTWIDTH] IN BLOOD BY AUTOMATED COUNT: 13.9 % (ref 11–15)
GLOBULIN PLAS-MCNC: 4.4 G/DL (ref 2.8–4.4)
GLUCOSE BLD-MCNC: 109 MG/DL (ref 70–99)
HCT VFR BLD AUTO: 41.2 % (ref 35–48)
HDLC SERPL-MCNC: 49 MG/DL (ref 40–59)
HGB BLD-MCNC: 13.5 G/DL (ref 12–16)
IMM GRANULOCYTES # BLD AUTO: 0.03 X10(3) UL (ref 0–1)
IMM GRANULOCYTES NFR BLD: 0.3 %
LDLC SERPL CALC-MCNC: 149 MG/DL (ref ?–100)
LYMPHOCYTES # BLD AUTO: 4.12 X10(3) UL (ref 1–4)
LYMPHOCYTES NFR BLD AUTO: 41.8 %
M PROTEIN MFR SERPL ELPH: 7.8 G/DL (ref 6.4–8.2)
MCH RBC QN AUTO: 27.8 PG (ref 26–34)
MCHC RBC AUTO-ENTMCNC: 32.8 G/DL (ref 31–37)
MCV RBC AUTO: 84.9 FL (ref 80–100)
MONOCYTES # BLD AUTO: 0.66 X10(3) UL (ref 0.1–1)
MONOCYTES NFR BLD AUTO: 6.7 %
NEUTROPHILS # BLD AUTO: 4.7 X10 (3) UL (ref 1.5–7.7)
NEUTROPHILS # BLD AUTO: 4.7 X10(3) UL (ref 1.5–7.7)
NEUTROPHILS NFR BLD AUTO: 47.8 %
NONHDLC SERPL-MCNC: 174 MG/DL (ref ?–130)
OSMOLALITY SERPL CALC.SUM OF ELEC: 288 MOSM/KG (ref 275–295)
PATIENT FASTING Y/N/NP: YES
PATIENT FASTING Y/N/NP: YES
PLATELET # BLD AUTO: 334 10(3)UL (ref 150–450)
POTASSIUM SERPL-SCNC: 3.6 MMOL/L (ref 3.5–5.1)
RBC # BLD AUTO: 4.85 X10(6)UL (ref 3.8–5.3)
SODIUM SERPL-SCNC: 138 MMOL/L (ref 136–145)
TRIGL SERPL-MCNC: 125 MG/DL (ref 30–149)
TSI SER-ACNC: 0.73 MIU/ML (ref 0.36–3.74)
VLDLC SERPL CALC-MCNC: 25 MG/DL (ref 0–30)
WBC # BLD AUTO: 9.9 X10(3) UL (ref 4–11)

## 2020-09-17 PROCEDURE — 80061 LIPID PANEL: CPT | Performed by: INTERNAL MEDICINE

## 2020-09-17 PROCEDURE — 80050 GENERAL HEALTH PANEL: CPT | Performed by: INTERNAL MEDICINE

## 2020-09-17 NOTE — TELEPHONE ENCOUNTER
Bloodwork orders placed to [facility] for upcoming physical per protocol.       Future Appointments   Date Time Provider Dianne Metzgeri   9/28/2020  2:15 PM Kaor Abrams MD [facility]

## 2020-09-28 ENCOUNTER — LAB ENCOUNTER (OUTPATIENT)
Dept: LAB | Age: 60
End: 2020-09-28
Attending: INTERNAL MEDICINE
Payer: COMMERCIAL

## 2020-09-28 ENCOUNTER — OFFICE VISIT (OUTPATIENT)
Dept: INTERNAL MEDICINE CLINIC | Facility: CLINIC | Age: 60
End: 2020-09-28
Payer: COMMERCIAL

## 2020-09-28 VITALS
SYSTOLIC BLOOD PRESSURE: 118 MMHG | TEMPERATURE: 97 F | RESPIRATION RATE: 16 BRPM | HEART RATE: 95 BPM | HEIGHT: 60 IN | WEIGHT: 242 LBS | BODY MASS INDEX: 47.51 KG/M2 | DIASTOLIC BLOOD PRESSURE: 62 MMHG

## 2020-09-28 DIAGNOSIS — G25.81 RESTLESS LEG SYNDROME: ICD-10-CM

## 2020-09-28 DIAGNOSIS — I10 ESSENTIAL HYPERTENSION: Primary | ICD-10-CM

## 2020-09-28 DIAGNOSIS — Z12.31 ENCOUNTER FOR SCREENING MAMMOGRAM FOR BREAST CANCER: ICD-10-CM

## 2020-09-28 DIAGNOSIS — M25.50 ARTHRALGIA, UNSPECIFIED JOINT: ICD-10-CM

## 2020-09-28 DIAGNOSIS — M79.7 FIBROMYALGIA: ICD-10-CM

## 2020-09-28 DIAGNOSIS — Z00.00 ROUTINE GENERAL MEDICAL EXAMINATION AT A HEALTH CARE FACILITY: ICD-10-CM

## 2020-09-28 LAB
RHEUMATOID FACT SERPL-ACNC: <10 IU/ML (ref ?–15)
SED RATE-ML: 27 MM/HR

## 2020-09-28 PROCEDURE — 3078F DIAST BP <80 MM HG: CPT | Performed by: INTERNAL MEDICINE

## 2020-09-28 PROCEDURE — 99212 OFFICE O/P EST SF 10 MIN: CPT | Performed by: INTERNAL MEDICINE

## 2020-09-28 PROCEDURE — 99396 PREV VISIT EST AGE 40-64: CPT | Performed by: INTERNAL MEDICINE

## 2020-09-28 PROCEDURE — 86431 RHEUMATOID FACTOR QUANT: CPT | Performed by: INTERNAL MEDICINE

## 2020-09-28 PROCEDURE — 86200 CCP ANTIBODY: CPT | Performed by: INTERNAL MEDICINE

## 2020-09-28 PROCEDURE — 3008F BODY MASS INDEX DOCD: CPT | Performed by: INTERNAL MEDICINE

## 2020-09-28 PROCEDURE — 3074F SYST BP LT 130 MM HG: CPT | Performed by: INTERNAL MEDICINE

## 2020-09-28 PROCEDURE — 85652 RBC SED RATE AUTOMATED: CPT | Performed by: INTERNAL MEDICINE

## 2020-09-28 RX ORDER — ROPINIROLE 0.25 MG/1
0.25 TABLET, FILM COATED ORAL EVERY EVENING
Qty: 30 TABLET | Refills: 1 | Status: SHIPPED | OUTPATIENT
Start: 2020-09-28 | End: 2021-10-08

## 2020-09-28 RX ORDER — CYCLOBENZAPRINE HCL 10 MG
TABLET ORAL
COMMUNITY
Start: 2020-06-29

## 2020-09-28 NOTE — PROGRESS NOTES
Faxed HERNANDEZ to [facility] C#[phone] S#[phone]. Requesting Pap Smear Results for  update. Confirmation received.

## 2020-09-30 LAB — CCP IGG SERPL-ACNC: 0.9 U/ML (ref 0–6.9)

## 2020-10-01 NOTE — PROGRESS NOTES
HPI:    Patient ID: Robert Jacinto is a 61year old female.     HPI  Here for pe, sees gyne, htn, co arthralgias, has seen rheum in past, fibromyalgia, co restless legs, freq maggie at night  /62 (BP Location: Right arm, Patient Position: Sitting, Cuff Si nightly as needed. 20 tablet 0     Allergies:  Meloxicam               OTHER (SEE COMMENTS)    Comment:Heart attack sx. Went to ER  Mobic                   OTHER (SEE COMMENTS)    Comment:Heart attack sx.  Went to ER  Seasonal                Runny nose   PH JEISON 2D+3D SCREENING BILAT (CPT=77067/23140)       IJ#[id number]

## 2020-11-11 ENCOUNTER — OFFICE VISIT (OUTPATIENT)
Dept: INTERNAL MEDICINE CLINIC | Facility: CLINIC | Age: 60
End: 2020-11-11
Payer: COMMERCIAL

## 2020-11-11 ENCOUNTER — LAB ENCOUNTER (OUTPATIENT)
Dept: LAB | Age: 60
End: 2020-11-11
Attending: INTERNAL MEDICINE
Payer: COMMERCIAL

## 2020-11-11 VITALS
SYSTOLIC BLOOD PRESSURE: 136 MMHG | HEIGHT: 60 IN | DIASTOLIC BLOOD PRESSURE: 76 MMHG | HEART RATE: 84 BPM | WEIGHT: 249.63 LBS | BODY MASS INDEX: 49.01 KG/M2 | TEMPERATURE: 99 F | OXYGEN SATURATION: 96 %

## 2020-11-11 DIAGNOSIS — M79.7 FIBROMYALGIA: ICD-10-CM

## 2020-11-11 DIAGNOSIS — Z20.828 VIRAL DISEASE EXPOSURE: ICD-10-CM

## 2020-11-11 DIAGNOSIS — I10 ESSENTIAL HYPERTENSION: Primary | ICD-10-CM

## 2020-11-11 DIAGNOSIS — Z90.710 H/O ABDOMINAL HYSTERECTOMY: ICD-10-CM

## 2020-11-11 PROCEDURE — 99072 ADDL SUPL MATRL&STAF TM PHE: CPT | Performed by: INTERNAL MEDICINE

## 2020-11-11 PROCEDURE — 3008F BODY MASS INDEX DOCD: CPT | Performed by: INTERNAL MEDICINE

## 2020-11-11 PROCEDURE — 90471 IMMUNIZATION ADMIN: CPT | Performed by: INTERNAL MEDICINE

## 2020-11-11 PROCEDURE — 36415 COLL VENOUS BLD VENIPUNCTURE: CPT | Performed by: INTERNAL MEDICINE

## 2020-11-11 PROCEDURE — 3078F DIAST BP <80 MM HG: CPT | Performed by: INTERNAL MEDICINE

## 2020-11-11 PROCEDURE — 86769 SARS-COV-2 COVID-19 ANTIBODY: CPT | Performed by: INTERNAL MEDICINE

## 2020-11-11 PROCEDURE — 90686 IIV4 VACC NO PRSV 0.5 ML IM: CPT | Performed by: INTERNAL MEDICINE

## 2020-11-11 PROCEDURE — 99213 OFFICE O/P EST LOW 20 MIN: CPT | Performed by: INTERNAL MEDICINE

## 2020-11-11 PROCEDURE — 3075F SYST BP GE 130 - 139MM HG: CPT | Performed by: INTERNAL MEDICINE

## 2020-11-16 NOTE — PROGRESS NOTES
HPI:    Patient ID: Sophie Reardon is a 61year old female.     HPI  Here for fibromyalgia, htn, rls, due for gyne, had abd hysterectomy, struggles with wt, after exam and done with visit pt mentions she has sinus pressure and rhinitis for few days  /7 • alprazolam (XANAX) 0.5 MG Oral Tab Take 1 tablet by mouth nightly as needed. 20 tablet 0     Allergies:  Meloxicam               OTHER (SEE COMMENTS)    Comment:Heart attack sx.  Went to ER  Mobic                   OTHER (SEE COMMENTS)    Comment:Heart

## 2020-11-23 ENCOUNTER — HOSPITAL ENCOUNTER (OUTPATIENT)
Dept: MAMMOGRAPHY | Facility: HOSPITAL | Age: 60
Discharge: HOME | End: 2020-11-23
Attending: INTERNAL MEDICINE
Payer: COMMERCIAL

## 2020-11-23 DIAGNOSIS — Z12.31 ENCOUNTER FOR SCREENING MAMMOGRAM FOR BREAST CANCER: ICD-10-CM

## 2020-11-23 PROCEDURE — 77063 BREAST TOMOSYNTHESIS BI: CPT | Performed by: INTERNAL MEDICINE

## 2020-11-23 PROCEDURE — 77067 SCR MAMMO BI INCL CAD: CPT | Performed by: INTERNAL MEDICINE

## 2020-11-30 RX ORDER — LISINOPRIL AND HYDROCHLOROTHIAZIDE 20; 12.5 MG/1; MG/1
1 TABLET ORAL DAILY
Qty: 90 TABLET | Refills: 0 | Status: SHIPPED | OUTPATIENT
Start: 2020-11-30 | End: 2021-03-01

## 2021-03-01 RX ORDER — LISINOPRIL AND HYDROCHLOROTHIAZIDE 20; 12.5 MG/1; MG/1
1 TABLET ORAL DAILY
Qty: 90 TABLET | Refills: 0 | Status: SHIPPED | OUTPATIENT
Start: 2021-03-01 | End: 2021-05-25

## 2021-04-21 NOTE — TELEPHONE ENCOUNTER
Called pt, LMTCB to schedule HFU. Essential hypertension under stable control continue current medications as directed

## 2021-05-25 RX ORDER — LISINOPRIL AND HYDROCHLOROTHIAZIDE 20; 12.5 MG/1; MG/1
1 TABLET ORAL DAILY
Qty: 90 TABLET | Refills: 0 | Status: SHIPPED | OUTPATIENT
Start: 2021-05-25 | End: 2021-08-20

## 2021-05-25 NOTE — TELEPHONE ENCOUNTER
Last Ov:11/11/20  Upcoming appt:none  Last labs:9/28/20 sed rate, RA factor  Last Rx:3/1/21 lisinopril HCTZ    Per Protocol sent for review

## 2021-06-01 ENCOUNTER — TELEPHONE (OUTPATIENT)
Dept: INTERNAL MEDICINE CLINIC | Facility: CLINIC | Age: 61
End: 2021-06-01

## 2021-06-01 NOTE — TELEPHONE ENCOUNTER
HERNANDEZ received from [address] [place] [address],[address] legal Support. Form sent to scan stat for further processing and a copy of form sent to scanning.

## 2021-06-29 ENCOUNTER — TELEPHONE (OUTPATIENT)
Dept: INTERNAL MEDICINE CLINIC | Facility: CLINIC | Age: 61
End: 2021-06-29

## 2021-06-29 NOTE — TELEPHONE ENCOUNTER
Pt is calling to see if we have received her results of a heart scan she had done about 15 days ago. I asked for the results to be faxed to us once again in case we don't have them. She will call [facility] right now. Fax number provided.

## 2021-06-30 NOTE — TELEPHONE ENCOUNTER
No record of Heart Scan on file. Advised patient to have Heart doctor fax result/scan to us. Awaiting records.

## 2021-07-06 ENCOUNTER — LAB ENCOUNTER (OUTPATIENT)
Dept: LAB | Facility: HOSPITAL | Age: 61
End: 2021-07-06
Attending: INTERNAL MEDICINE
Payer: COMMERCIAL

## 2021-07-06 DIAGNOSIS — Z01.818 PRE-OP TESTING: ICD-10-CM

## 2021-07-07 LAB — SARS-COV-2 RNA RESP QL NAA+PROBE: NOT DETECTED

## 2021-07-08 ENCOUNTER — ANESTHESIA EVENT (OUTPATIENT)
Dept: ENDOSCOPY | Facility: HOSPITAL | Age: 61
End: 2021-07-08
Payer: COMMERCIAL

## 2021-07-08 ENCOUNTER — ANESTHESIA (OUTPATIENT)
Dept: ENDOSCOPY | Facility: HOSPITAL | Age: 61
End: 2021-07-08
Payer: COMMERCIAL

## 2021-07-08 ENCOUNTER — HOSPITAL ENCOUNTER (OUTPATIENT)
Facility: HOSPITAL | Age: 61
Setting detail: HOSPITAL OUTPATIENT SURGERY
Discharge: HOME | End: 2021-07-08
Attending: INTERNAL MEDICINE | Admitting: INTERNAL MEDICINE
Payer: COMMERCIAL

## 2021-07-08 VITALS
RESPIRATION RATE: 22 BRPM | HEART RATE: 78 BPM | DIASTOLIC BLOOD PRESSURE: 75 MMHG | BODY MASS INDEX: 41.41 KG/M2 | WEIGHT: 225 LBS | OXYGEN SATURATION: 96 % | TEMPERATURE: 97 F | HEIGHT: 62 IN | SYSTOLIC BLOOD PRESSURE: 144 MMHG

## 2021-07-08 DIAGNOSIS — Z01.818 PRE-OP TESTING: Primary | ICD-10-CM

## 2021-07-08 DIAGNOSIS — Z86.010 HISTORY OF ADENOMATOUS POLYP OF COLON: ICD-10-CM

## 2021-07-08 DIAGNOSIS — K52.9 CHRONIC DIARRHEA: ICD-10-CM

## 2021-07-08 PROCEDURE — 88305 TISSUE EXAM BY PATHOLOGIST: CPT | Performed by: INTERNAL MEDICINE

## 2021-07-08 PROCEDURE — 0DBN8ZX EXCISION OF SIGMOID COLON, VIA NATURAL OR ARTIFICIAL OPENING ENDOSCOPIC, DIAGNOSTIC: ICD-10-PCS | Performed by: INTERNAL MEDICINE

## 2021-07-08 PROCEDURE — 36415 COLL VENOUS BLD VENIPUNCTURE: CPT | Performed by: INTERNAL MEDICINE

## 2021-07-08 RX ORDER — MIDAZOLAM HYDROCHLORIDE 1 MG/ML
1 INJECTION INTRAMUSCULAR; INTRAVENOUS EVERY 5 MIN PRN
Status: DISCONTINUED | OUTPATIENT
Start: 2021-07-08 | End: 2021-07-08

## 2021-07-08 RX ORDER — SODIUM CHLORIDE, SODIUM LACTATE, POTASSIUM CHLORIDE, CALCIUM CHLORIDE 600; 310; 30; 20 MG/100ML; MG/100ML; MG/100ML; MG/100ML
INJECTION, SOLUTION INTRAVENOUS CONTINUOUS PRN
Status: DISCONTINUED | OUTPATIENT
Start: 2021-07-08 | End: 2021-07-08 | Stop reason: SURG

## 2021-07-08 RX ORDER — SODIUM CHLORIDE, SODIUM LACTATE, POTASSIUM CHLORIDE, CALCIUM CHLORIDE 600; 310; 30; 20 MG/100ML; MG/100ML; MG/100ML; MG/100ML
INJECTION, SOLUTION INTRAVENOUS CONTINUOUS
Status: DISCONTINUED | OUTPATIENT
Start: 2021-07-08 | End: 2021-07-08

## 2021-07-08 RX ORDER — NALOXONE HYDROCHLORIDE 0.4 MG/ML
80 INJECTION, SOLUTION INTRAMUSCULAR; INTRAVENOUS; SUBCUTANEOUS AS NEEDED
Status: DISCONTINUED | OUTPATIENT
Start: 2021-07-08 | End: 2021-07-08

## 2021-07-08 RX ORDER — LIDOCAINE HYDROCHLORIDE 10 MG/ML
INJECTION, SOLUTION EPIDURAL; INFILTRATION; INTRACAUDAL; PERINEURAL AS NEEDED
Status: DISCONTINUED | OUTPATIENT
Start: 2021-07-08 | End: 2021-07-08 | Stop reason: SURG

## 2021-07-08 RX ORDER — SODIUM CHLORIDE 0.9 % (FLUSH) 0.9 %
10 SYRINGE (ML) INJECTION AS NEEDED
Status: DISCONTINUED | OUTPATIENT
Start: 2021-07-08 | End: 2021-07-08

## 2021-07-08 RX ADMIN — LIDOCAINE HYDROCHLORIDE 50 MG: 10 INJECTION, SOLUTION EPIDURAL; INFILTRATION; INTRACAUDAL; PERINEURAL at 14:56:00

## 2021-07-08 RX ADMIN — SODIUM CHLORIDE, SODIUM LACTATE, POTASSIUM CHLORIDE, CALCIUM CHLORIDE: 600; 310; 30; 20 INJECTION, SOLUTION INTRAVENOUS at 14:54:00

## 2021-07-08 RX ADMIN — SODIUM CHLORIDE, SODIUM LACTATE, POTASSIUM CHLORIDE, CALCIUM CHLORIDE: 600; 310; 30; 20 INJECTION, SOLUTION INTRAVENOUS at 15:14:00

## 2021-07-08 NOTE — OPERATIVE REPORT
COLONOSCOPY REPORT    Patient Name:  Kimber Pratt Record #: [id number]  YOB: 1960  Date of Procedure: 7/8/2021    Referring physician: Ann Calderón MD    Surgeon:  Sigifredo West.  Wm Geiger MD    Pre-op diagnosis: Chronic watery diar

## 2021-07-08 NOTE — H&P
RE-PROCEDURE UPDATE    HPI: Ruperto Del Castillo is a 64year old female. 5/10/1960. Patient presents for a colonoscopy. ALLERGIES:   Meloxicam               OTHER (SEE COMMENTS)    Comment:Heart attack sx.  Went to ER  Mobic                   OTHER (SEE COM Adalberto Espitia MD

## 2021-07-08 NOTE — ANESTHESIA POSTPROCEDURE EVALUATION
Patient: Ruperto Del Castillo    Procedure Summary     Date: 07/08/21 Room / Location: [address] [place] [address] ENDOSCOPY 05 / [address] [place] [address] ENDOSCOPY    Anesthesia Start: 6806 Anesthesia Stop:     Procedure: COLONOSCOPY (N/A ) Diagnosis:       Chronic diarrhea      History of adenomatous poly

## 2021-07-08 NOTE — ANESTHESIA PREPROCEDURE EVALUATION
Anesthesia PreOp Note    HPI:     Jude Grullon is a 64year old female who presents for preoperative consultation requested by: Obey Sanchez MD    Date of Surgery: 7/8/2021    Procedure(s):  COLONOSCOPY  Indication: Chronic diarrhea, History of adeno Laterality Date   • APPENDECTOMY     • CHOLECYSTECTOMY  2000   • COLONOSCOPY  09/2018   • COLONOSCOPY N/A 9/19/2018    Procedure: COLONOSCOPY, POSSIBLE BIOPSY, POSSIBLE POLYPECTOMY 32581;  Surgeon: Darryl Hooper MD;  Location: [address] [place] [address] every 4 (four) hours as needed.  (Patient not taking: Reported on 6/8/2021 ), Disp: 80 tablet, Rfl: 0      lactated ringers infusion, , Intravenous, Continuous, Marysol Carballo MD, Last Rate: 20 mL/hr at 07/08/21 1418, New Bag at 07/08/21 1418    [place] Family:       Frequency of Social Gatherings with Friends and Family:       Attends Rastafari Services:       Active Member of Clubs or Organizations:       Attends Club or Organization Meetings:       Marital Status:   Intimate Partner Violence:       Fea

## 2021-07-11 NOTE — PROGRESS NOTES
7/10/2021  [address]  [phone] [phone]    Dear Kari Maya,     Here are the results from your recent testing : Your colonoscopy showed diverticulosis.   These are pockets in the colon which are due to a relatively low fiber diet in t

## 2021-08-19 NOTE — TELEPHONE ENCOUNTER
Last VISIT 11/11/21    Last CPE 09/28/20    Last REFILL 05/25/21 qty 90 w/0 refills    Last LABS 07/06/21 Covid test done    No Future Appointments        Per PROTOCOL? Failed    Please Approve or Deny.

## 2021-08-20 RX ORDER — LISINOPRIL AND HYDROCHLOROTHIAZIDE 20; 12.5 MG/1; MG/1
1 TABLET ORAL DAILY
Qty: 90 TABLET | Refills: 0 | Status: SHIPPED | OUTPATIENT
Start: 2021-08-20 | End: 2021-11-12

## 2021-09-28 ENCOUNTER — TELEPHONE (OUTPATIENT)
Dept: INTERNAL MEDICINE CLINIC | Facility: CLINIC | Age: 61
End: 2021-09-28

## 2021-09-28 DIAGNOSIS — Z13.228 SCREENING FOR METABOLIC DISORDER: ICD-10-CM

## 2021-09-28 DIAGNOSIS — Z13.29 SCREENING FOR THYROID DISORDER: ICD-10-CM

## 2021-09-28 DIAGNOSIS — Z13.0 SCREENING FOR BLOOD DISEASE: ICD-10-CM

## 2021-09-28 DIAGNOSIS — Z13.220 SCREENING FOR LIPID DISORDERS: ICD-10-CM

## 2021-09-28 DIAGNOSIS — Z00.00 ROUTINE GENERAL MEDICAL EXAMINATION AT A HEALTH CARE FACILITY: Primary | ICD-10-CM

## 2021-09-28 NOTE — TELEPHONE ENCOUNTER
cpe   Future Appointments   Date Time Provider Dinane Yojana   10/8/2021  1:50 PM Laron Lemus MD [id number]  [facility]   10/21/2021  4:20 PM Leela Gar PA-C [facility]        Orders to  [facility]   aware must fast no call back requi

## 2021-10-13 ENCOUNTER — LAB ENCOUNTER (OUTPATIENT)
Dept: LAB | Age: 61
End: 2021-10-13
Attending: PHYSICIAN ASSISTANT
Payer: COMMERCIAL

## 2021-10-13 DIAGNOSIS — Z00.00 ROUTINE GENERAL MEDICAL EXAMINATION AT A HEALTH CARE FACILITY: ICD-10-CM

## 2021-10-13 DIAGNOSIS — Z13.228 SCREENING FOR METABOLIC DISORDER: ICD-10-CM

## 2021-10-13 DIAGNOSIS — R73.9 HYPERGLYCEMIA: ICD-10-CM

## 2021-10-13 DIAGNOSIS — Z13.29 SCREENING FOR THYROID DISORDER: ICD-10-CM

## 2021-10-13 DIAGNOSIS — Z13.220 SCREENING FOR LIPID DISORDERS: ICD-10-CM

## 2021-10-13 DIAGNOSIS — Z13.0 SCREENING FOR BLOOD DISEASE: ICD-10-CM

## 2021-10-13 PROCEDURE — 80050 GENERAL HEALTH PANEL: CPT | Performed by: PHYSICIAN ASSISTANT

## 2021-10-13 PROCEDURE — 83036 HEMOGLOBIN GLYCOSYLATED A1C: CPT | Performed by: PHYSICIAN ASSISTANT

## 2021-10-13 PROCEDURE — 80061 LIPID PANEL: CPT | Performed by: PHYSICIAN ASSISTANT

## 2021-10-21 ENCOUNTER — TELEPHONE (OUTPATIENT)
Dept: INTERNAL MEDICINE CLINIC | Facility: CLINIC | Age: 61
End: 2021-10-21

## 2021-10-21 NOTE — TELEPHONE ENCOUNTER
Pt was scheduled to see CB today and appt was cxl by provider.  We left a message to call back to rs

## 2021-11-03 ENCOUNTER — TELEPHONE (OUTPATIENT)
Dept: INTERNAL MEDICINE CLINIC | Facility: CLINIC | Age: 61
End: 2021-11-03

## 2021-11-03 NOTE — TELEPHONE ENCOUNTER
Future Appointments   Date Time Provider Dianne Dial   11/24/2021 11:00 AM Leela Gar PA-C [facility]

## 2021-11-12 RX ORDER — LISINOPRIL AND HYDROCHLOROTHIAZIDE 20; 12.5 MG/1; MG/1
1 TABLET ORAL DAILY
Qty: 90 TABLET | Refills: 0 | Status: SHIPPED | OUTPATIENT
Start: 2021-11-12

## 2021-11-24 ENCOUNTER — OFFICE VISIT (OUTPATIENT)
Dept: INTERNAL MEDICINE CLINIC | Facility: CLINIC | Age: 61
End: 2021-11-24
Payer: COMMERCIAL

## 2021-11-24 VITALS
TEMPERATURE: 98 F | BODY MASS INDEX: 46.01 KG/M2 | DIASTOLIC BLOOD PRESSURE: 72 MMHG | RESPIRATION RATE: 16 BRPM | WEIGHT: 250 LBS | HEART RATE: 90 BPM | SYSTOLIC BLOOD PRESSURE: 116 MMHG | HEIGHT: 62 IN

## 2021-11-24 DIAGNOSIS — I10 PRIMARY HYPERTENSION: ICD-10-CM

## 2021-11-24 DIAGNOSIS — R73.03 PRE-DIABETES: ICD-10-CM

## 2021-11-24 DIAGNOSIS — M79.7 FIBROMYALGIA: ICD-10-CM

## 2021-11-24 DIAGNOSIS — Z00.00 ANNUAL PHYSICAL EXAM: Primary | ICD-10-CM

## 2021-11-24 DIAGNOSIS — E66.01 CLASS 3 SEVERE OBESITY DUE TO EXCESS CALORIES WITH SERIOUS COMORBIDITY AND BODY MASS INDEX (BMI) OF 45.0 TO 49.9 IN ADULT (HCC): ICD-10-CM

## 2021-11-24 PROCEDURE — 99396 PREV VISIT EST AGE 40-64: CPT | Performed by: PHYSICIAN ASSISTANT

## 2021-11-24 PROCEDURE — 3008F BODY MASS INDEX DOCD: CPT | Performed by: PHYSICIAN ASSISTANT

## 2021-11-24 PROCEDURE — 3074F SYST BP LT 130 MM HG: CPT | Performed by: PHYSICIAN ASSISTANT

## 2021-11-24 PROCEDURE — 3078F DIAST BP <80 MM HG: CPT | Performed by: PHYSICIAN ASSISTANT

## 2021-11-24 NOTE — PROGRESS NOTES
Patient presents with:  Physical: DD Rm 2, Annual Physical      HPI:  Pt presents for annual physical.  She follows with Gyn for well woman care and last visit was earlier this year. Pre-diabetes - A1c stable at 6.3. Admits that diet has been poor. Pernicious anemia     Osteoarthritis of patellofemoral joint     Effusion of knee     Unspecified internal derangement of knee     HTN (hypertension)     Morbid obesity with BMI of 40.0-44.9, adult (HCC)     Fibromyalgia     Osteoarthritis of knees, bilate Alcohol use:  Yes      Alcohol/week: 0.0 standard drinks      Comment: rare    Drug use: No      Current Outpatient Medications   Medication Sig Dispense Refill   • LISINOPRIL-HYDROCHLOROTHIAZIDE 20-12.5 MG Oral Tab TAKE 1 TABLET BY MOUTH DAILY 90 tablet 0 (1.575 m)   Wt 250 lb (113.4 kg)   BMI 45.73 kg/m²   Constitutional: Oriented to person, place, and time. No distress. HEENT:  Normocephalic and atraumatic.  Tympanic membranes normal.  Nose normal. Oropharynx is clear and moist.   Eyes: Conjunctivae are Phosphatase 10/13/2021 98  50 - 130 U/L Final   • Bilirubin, Total 10/13/2021 0.3  0.1 - 2.0 mg/dL Final   • Total Protein 10/13/2021 7.5  6.4 - 8.2 g/dL Final   • Albumin 10/13/2021 3.3* 3.4 - 5.0 g/dL Final   • Globulin  10/13/2021 4.2  2.8 - 4.4 g/dL Fi Final   • Estimated Average Glucose 10/13/2021 134* 68 - 126 mg/dL Final       A/P:    Annual physical exam  (primary encounter diagnosis) - Recommended Tdap, Shingrix and flu shot.   Pt declined all today but will pursue one at a time sooner rather than la

## 2021-11-27 ENCOUNTER — HOSPITAL ENCOUNTER (OUTPATIENT)
Dept: MAMMOGRAPHY | Facility: HOSPITAL | Age: 61
Discharge: HOME | End: 2021-11-27
Attending: OBSTETRICS & GYNECOLOGY
Payer: COMMERCIAL

## 2021-11-27 DIAGNOSIS — Z01.419 ENCOUNTER FOR ANNUAL ROUTINE GYNECOLOGICAL EXAMINATION: ICD-10-CM

## 2021-12-20 ENCOUNTER — HOSPITAL ENCOUNTER (OUTPATIENT)
Dept: MAMMOGRAPHY | Facility: HOSPITAL | Age: 61
Discharge: HOME | End: 2021-12-20
Attending: OBSTETRICS & GYNECOLOGY
Payer: COMMERCIAL

## 2021-12-20 DIAGNOSIS — Z01.419 ENCOUNTER FOR ANNUAL ROUTINE GYNECOLOGICAL EXAMINATION: ICD-10-CM

## 2021-12-20 PROCEDURE — 77067 SCR MAMMO BI INCL CAD: CPT | Performed by: OBSTETRICS & GYNECOLOGY

## 2021-12-20 PROCEDURE — 77063 BREAST TOMOSYNTHESIS BI: CPT | Performed by: OBSTETRICS & GYNECOLOGY

## 2022-01-12 ENCOUNTER — TELEMEDICINE (OUTPATIENT)
Dept: INTERNAL MEDICINE CLINIC | Facility: CLINIC | Age: 62
End: 2022-01-12

## 2022-01-12 ENCOUNTER — LAB ENCOUNTER (OUTPATIENT)
Dept: LAB | Facility: HOSPITAL | Age: 62
End: 2022-01-12
Attending: PHYSICIAN ASSISTANT
Payer: COMMERCIAL

## 2022-01-12 DIAGNOSIS — R05.9 COUGH: ICD-10-CM

## 2022-01-12 DIAGNOSIS — R09.81 HEAD CONGESTION: ICD-10-CM

## 2022-01-12 DIAGNOSIS — J02.9 SORE THROAT: ICD-10-CM

## 2022-01-12 DIAGNOSIS — R53.83 FATIGUE, UNSPECIFIED TYPE: ICD-10-CM

## 2022-01-12 DIAGNOSIS — R09.81 HEAD CONGESTION: Primary | ICD-10-CM

## 2022-01-12 PROCEDURE — 99213 OFFICE O/P EST LOW 20 MIN: CPT | Performed by: PHYSICIAN ASSISTANT

## 2022-01-12 RX ORDER — BENZONATATE 200 MG/1
200 CAPSULE ORAL 3 TIMES DAILY PRN
Qty: 20 CAPSULE | Refills: 0 | Status: SHIPPED | OUTPATIENT
Start: 2022-01-12

## 2022-01-12 NOTE — PROGRESS NOTES
Due to COVID-19 ACTION PLAN, the patient's office visit was converted to a video visit. Ludy Concepcion verbally consents to a Virtual/Telephone visit on 01/12/22.  Patient understands and accepts financial responsibility for any deductible, co-insurance a substitute for face-to-face examination or emergency care. Patient advised to go to ER or call 911 for worsening symptoms or acute distress.      Please note that the following visit was completed using two-way, real-time interactive audio and/or video comm

## 2022-01-12 NOTE — PATIENT INSTRUCTIONS
CDC Update: Isolation & Quarantine Guidelines for Patients - Updated 12/29/2021      If You Test Positive for COVID-19 (Isolate)    Everyone regardless of vaccination status:   • Stay home for 5 days  • If you have no symptoms or your symptoms are resolvin

## 2022-01-15 LAB — SARS-COV-2 RNA RESP QL NAA+PROBE: NOT DETECTED

## 2022-02-10 RX ORDER — LISINOPRIL AND HYDROCHLOROTHIAZIDE 20; 12.5 MG/1; MG/1
1 TABLET ORAL DAILY
Qty: 90 TABLET | Refills: 0 | Status: SHIPPED | OUTPATIENT
Start: 2022-02-10

## 2022-03-14 ENCOUNTER — LAB ENCOUNTER (OUTPATIENT)
Dept: LAB | Facility: HOSPITAL | Age: 62
End: 2022-03-14
Attending: INTERNAL MEDICINE
Payer: COMMERCIAL

## 2022-03-14 DIAGNOSIS — E66.9 OBESITY, UNSPECIFIED: ICD-10-CM

## 2022-03-14 DIAGNOSIS — E78.5 HYPERLIPEMIA: Primary | ICD-10-CM

## 2022-03-14 LAB
ALBUMIN SERPL-MCNC: 3.3 G/DL (ref 3.4–5)
ALBUMIN/GLOB SERPL: 0.9 {RATIO} (ref 1–2)
ALP LIVER SERPL-CCNC: 89 U/L
ALT SERPL-CCNC: 34 U/L
ANION GAP SERPL CALC-SCNC: 3 MMOL/L (ref 0–18)
AST SERPL-CCNC: 22 U/L (ref 15–37)
BILIRUB SERPL-MCNC: 0.3 MG/DL (ref 0.1–2)
BUN BLD-MCNC: 18 MG/DL (ref 7–18)
CALCIUM BLD-MCNC: 9 MG/DL (ref 8.5–10.1)
CHLORIDE SERPL-SCNC: 108 MMOL/L (ref 98–112)
CHOLEST SERPL-MCNC: 158 MG/DL (ref ?–200)
CO2 SERPL-SCNC: 27 MMOL/L (ref 21–32)
CREAT BLD-MCNC: 0.7 MG/DL
FASTING PATIENT LIPID ANSWER: YES
FASTING STATUS PATIENT QL REPORTED: YES
GLOBULIN PLAS-MCNC: 3.6 G/DL (ref 2.8–4.4)
GLUCOSE BLD-MCNC: 112 MG/DL (ref 70–99)
HDLC SERPL-MCNC: 50 MG/DL (ref 40–59)
LDLC SERPL CALC-MCNC: 84 MG/DL (ref ?–100)
NONHDLC SERPL-MCNC: 108 MG/DL (ref ?–130)
OSMOLALITY SERPL CALC.SUM OF ELEC: 289 MOSM/KG (ref 275–295)
POTASSIUM SERPL-SCNC: 4.3 MMOL/L (ref 3.5–5.1)
PROT SERPL-MCNC: 6.9 G/DL (ref 6.4–8.2)
SODIUM SERPL-SCNC: 138 MMOL/L (ref 136–145)
TRIGL SERPL-MCNC: 139 MG/DL (ref 30–149)
VLDLC SERPL CALC-MCNC: 22 MG/DL (ref 0–30)

## 2022-03-14 PROCEDURE — 80061 LIPID PANEL: CPT

## 2022-03-14 PROCEDURE — 80053 COMPREHEN METABOLIC PANEL: CPT

## 2022-03-14 PROCEDURE — 36415 COLL VENOUS BLD VENIPUNCTURE: CPT

## 2022-04-11 ENCOUNTER — TELEPHONE (OUTPATIENT)
Dept: INTERNAL MEDICINE CLINIC | Facility: CLINIC | Age: 62
End: 2022-04-11

## 2022-04-11 NOTE — TELEPHONE ENCOUNTER
Patient scheduled.     Future Appointments   Date Time Provider Dianne Yojana   4/20/2022 11:45 AM Sophie Aguilar MD [facility]   4/21/2022  2:00 PM Chica Bowles PA-C [facility]

## 2022-04-11 NOTE — TELEPHONE ENCOUNTER
Woody results received . Per CB, patient needs appt to discuss test results. PSR: Please schedule patient for follow up. Results placed in CB folder.

## 2022-04-12 ENCOUNTER — LAB ENCOUNTER (OUTPATIENT)
Dept: LAB | Facility: HOSPITAL | Age: 62
End: 2022-04-12
Attending: INTERNAL MEDICINE
Payer: COMMERCIAL

## 2022-04-12 DIAGNOSIS — R06.00 DOE (DYSPNEA ON EXERTION): Primary | ICD-10-CM

## 2022-04-12 DIAGNOSIS — E78.5 HYPERLIPIDEMIA: ICD-10-CM

## 2022-04-12 LAB
CHOLEST SERPL-MCNC: 139 MG/DL (ref ?–200)
FASTING PATIENT LIPID ANSWER: YES
HDLC SERPL-MCNC: 55 MG/DL (ref 40–59)
LDLC SERPL CALC-MCNC: 61 MG/DL (ref ?–100)
NONHDLC SERPL-MCNC: 84 MG/DL (ref ?–130)
TRIGL SERPL-MCNC: 129 MG/DL (ref 30–149)
VLDLC SERPL CALC-MCNC: 19 MG/DL (ref 0–30)

## 2022-04-12 PROCEDURE — 80061 LIPID PANEL: CPT

## 2022-04-12 PROCEDURE — 36415 COLL VENOUS BLD VENIPUNCTURE: CPT

## 2022-04-20 ENCOUNTER — LAB ENCOUNTER (OUTPATIENT)
Dept: LAB | Facility: HOSPITAL | Age: 62
End: 2022-04-20
Attending: STUDENT IN AN ORGANIZED HEALTH CARE EDUCATION/TRAINING PROGRAM
Payer: COMMERCIAL

## 2022-04-20 ENCOUNTER — OFFICE VISIT (OUTPATIENT)
Dept: ENDOCRINOLOGY CLINIC | Facility: CLINIC | Age: 62
End: 2022-04-20
Payer: COMMERCIAL

## 2022-04-20 VITALS
DIASTOLIC BLOOD PRESSURE: 70 MMHG | SYSTOLIC BLOOD PRESSURE: 116 MMHG | BODY MASS INDEX: 45 KG/M2 | HEART RATE: 84 BPM | WEIGHT: 248.63 LBS | OXYGEN SATURATION: 96 %

## 2022-04-20 DIAGNOSIS — E66.01 MORBID OBESITY WITH BMI OF 40.0-44.9, ADULT (HCC): Primary | ICD-10-CM

## 2022-04-20 DIAGNOSIS — E66.01 MORBID OBESITY WITH BMI OF 40.0-44.9, ADULT (HCC): ICD-10-CM

## 2022-04-20 LAB
T4 FREE SERPL-MCNC: 1.2 NG/DL (ref 0.8–1.7)
THYROGLOB SERPL-MCNC: 17 U/ML (ref ?–60)
THYROPEROXIDASE AB SERPL-ACNC: 38 U/ML (ref ?–60)
TSI SER-ACNC: 0.97 MIU/ML (ref 0.36–3.74)
VIT B12 SERPL-MCNC: 1340 PG/ML (ref 193–986)
VIT D+METAB SERPL-MCNC: 32.4 NG/ML (ref 30–100)

## 2022-04-20 PROCEDURE — 86376 MICROSOMAL ANTIBODY EACH: CPT

## 2022-04-20 PROCEDURE — 82607 VITAMIN B-12: CPT

## 2022-04-20 PROCEDURE — 36415 COLL VENOUS BLD VENIPUNCTURE: CPT

## 2022-04-20 PROCEDURE — 82306 VITAMIN D 25 HYDROXY: CPT

## 2022-04-20 PROCEDURE — 86800 THYROGLOBULIN ANTIBODY: CPT

## 2022-04-20 PROCEDURE — 84439 ASSAY OF FREE THYROXINE: CPT

## 2022-04-20 PROCEDURE — 84443 ASSAY THYROID STIM HORMONE: CPT

## 2022-04-20 NOTE — TELEPHONE ENCOUNTER
Reason for appt was to f/u on CT results. I do not see recent CT- there is one from Oct.     Discussed with CB. Pt had abnormality on over-read CT. Needs to reschedule. Please call to reschedule. We will need to know if she does not. Thanks.

## 2022-04-27 NOTE — TELEPHONE ENCOUNTER
Pt scheduled for follow up tomorrow. States that the [employer]" has been there for the last 25 years and it's calcified although patient states that no formal workup has been done on it. She is agreeable to visit tomorrow at 2:30 and will come in     Pt states that she does not have any other imaging to bring with her - I do see she had a CT done 10/2021     Please monitor and hold in bucket to assure patient keeps appt tomorrow and if not seen we need to follow up with the patient again.

## 2022-04-28 ENCOUNTER — OFFICE VISIT (OUTPATIENT)
Dept: INTERNAL MEDICINE CLINIC | Facility: CLINIC | Age: 62
End: 2022-04-28
Payer: COMMERCIAL

## 2022-04-28 VITALS
OXYGEN SATURATION: 94 % | RESPIRATION RATE: 20 BRPM | TEMPERATURE: 97 F | WEIGHT: 246.38 LBS | HEART RATE: 98 BPM | SYSTOLIC BLOOD PRESSURE: 138 MMHG | HEIGHT: 61 IN | BODY MASS INDEX: 46.52 KG/M2 | DIASTOLIC BLOOD PRESSURE: 80 MMHG

## 2022-04-28 DIAGNOSIS — R16.0 LIVER MASS: Primary | ICD-10-CM

## 2022-04-28 DIAGNOSIS — J01.10 ACUTE NON-RECURRENT FRONTAL SINUSITIS: ICD-10-CM

## 2022-04-28 DIAGNOSIS — E66.01 CLASS 3 SEVERE OBESITY DUE TO EXCESS CALORIES WITHOUT SERIOUS COMORBIDITY WITH BODY MASS INDEX (BMI) OF 45.0 TO 49.9 IN ADULT (HCC): ICD-10-CM

## 2022-04-28 PROCEDURE — 3008F BODY MASS INDEX DOCD: CPT | Performed by: PHYSICIAN ASSISTANT

## 2022-04-28 PROCEDURE — 99214 OFFICE O/P EST MOD 30 MIN: CPT | Performed by: PHYSICIAN ASSISTANT

## 2022-04-28 PROCEDURE — 3075F SYST BP GE 130 - 139MM HG: CPT | Performed by: PHYSICIAN ASSISTANT

## 2022-04-28 PROCEDURE — 3079F DIAST BP 80-89 MM HG: CPT | Performed by: PHYSICIAN ASSISTANT

## 2022-04-28 RX ORDER — AMOXICILLIN AND CLAVULANATE POTASSIUM 875; 125 MG/1; MG/1
1 TABLET, FILM COATED ORAL 2 TIMES DAILY
Qty: 20 TABLET | Refills: 0 | Status: SHIPPED | OUTPATIENT
Start: 2022-04-28 | End: 2022-05-08

## 2022-04-28 RX ORDER — MONTELUKAST SODIUM 10 MG/1
10 TABLET ORAL DAILY
COMMUNITY
Start: 2021-12-10

## 2022-04-28 RX ORDER — EVOLOCUMAB 140 MG/ML
140 INJECTION, SOLUTION SUBCUTANEOUS
COMMUNITY
Start: 2022-04-20

## 2022-04-28 RX ORDER — PANTOPRAZOLE SODIUM 40 MG/1
40 TABLET, DELAYED RELEASE ORAL
Qty: 90 TABLET | Refills: 2 | Status: SHIPPED | OUTPATIENT
Start: 2022-04-28

## 2022-04-28 RX ORDER — FLUOCINOLONE ACETONIDE, HYDROQUINONE, AND TRETINOIN .1; 40; .5 MG/G; MG/G; MG/G
1 CREAM TOPICAL DAILY
COMMUNITY
Start: 2021-02-15

## 2022-05-03 PROBLEM — Z90.710 HISTORY OF TOTAL ABDOMINAL HYSTERECTOMY: Status: ACTIVE | Noted: 2022-05-03

## 2022-05-11 RX ORDER — LISINOPRIL AND HYDROCHLOROTHIAZIDE 20; 12.5 MG/1; MG/1
1 TABLET ORAL DAILY
Qty: 90 TABLET | Refills: 0 | Status: SHIPPED | OUTPATIENT
Start: 2022-05-11

## 2022-05-11 NOTE — TELEPHONE ENCOUNTER
PASSED per protocol, refill sent.     Last PE-- - CB    Future Appointments   Date Time Provider Dianne Dial   7/8/2022  1:20 PM MARCOS Wright [facility]

## 2022-07-08 ENCOUNTER — OFFICE VISIT (OUTPATIENT)
Dept: INTERNAL MEDICINE CLINIC | Facility: CLINIC | Age: 62
End: 2022-07-08
Payer: COMMERCIAL

## 2022-07-08 VITALS
WEIGHT: 246 LBS | OXYGEN SATURATION: 96 % | RESPIRATION RATE: 18 BRPM | HEIGHT: 60.5 IN | SYSTOLIC BLOOD PRESSURE: 120 MMHG | DIASTOLIC BLOOD PRESSURE: 82 MMHG | HEART RATE: 97 BPM | BODY MASS INDEX: 47.05 KG/M2

## 2022-07-08 DIAGNOSIS — Z51.81 ENCOUNTER FOR THERAPEUTIC DRUG LEVEL MONITORING: Primary | ICD-10-CM

## 2022-07-08 DIAGNOSIS — R73.03 PREDIABETES: ICD-10-CM

## 2022-07-08 DIAGNOSIS — E78.2 MIXED HYPERLIPIDEMIA: ICD-10-CM

## 2022-07-08 DIAGNOSIS — I10 PRIMARY HYPERTENSION: ICD-10-CM

## 2022-07-08 DIAGNOSIS — E66.01 CLASS 3 SEVERE OBESITY WITH SERIOUS COMORBIDITY AND BODY MASS INDEX (BMI) OF 45.0 TO 49.9 IN ADULT, UNSPECIFIED OBESITY TYPE (HCC): ICD-10-CM

## 2022-07-08 PROCEDURE — 3079F DIAST BP 80-89 MM HG: CPT | Performed by: PHYSICIAN ASSISTANT

## 2022-07-08 PROCEDURE — 3074F SYST BP LT 130 MM HG: CPT | Performed by: PHYSICIAN ASSISTANT

## 2022-07-08 PROCEDURE — 99214 OFFICE O/P EST MOD 30 MIN: CPT | Performed by: PHYSICIAN ASSISTANT

## 2022-07-08 PROCEDURE — 3008F BODY MASS INDEX DOCD: CPT | Performed by: PHYSICIAN ASSISTANT

## 2022-07-08 RX ORDER — METFORMIN HYDROCHLORIDE 750 MG/1
750 TABLET, EXTENDED RELEASE ORAL DAILY
Qty: 30 TABLET | Refills: 0 | Status: SHIPPED | OUTPATIENT
Start: 2022-07-08

## 2022-07-20 NOTE — TELEPHONE ENCOUNTER
Pt states it has been awhile since she filled script but she is traveling soon and wants refill for mont of august.

## 2022-07-25 NOTE — TELEPHONE ENCOUNTER
I prescribed 5 mg of regular last time in 2019, then another provider (from another office) prescribed Ambien CR 6.25 after that. What is she requesting and I will only plan to refill short term for travel.

## 2022-08-04 ENCOUNTER — HOSPITAL ENCOUNTER (OUTPATIENT)
Dept: GENERAL RADIOLOGY | Age: 62
Discharge: HOME | End: 2022-08-04
Attending: FAMILY MEDICINE
Payer: COMMERCIAL

## 2022-08-04 ENCOUNTER — OFFICE VISIT (OUTPATIENT)
Dept: INTERNAL MEDICINE CLINIC | Facility: CLINIC | Age: 62
End: 2022-08-04
Payer: COMMERCIAL

## 2022-08-04 VITALS
HEART RATE: 99 BPM | BODY MASS INDEX: 46.07 KG/M2 | RESPIRATION RATE: 18 BRPM | DIASTOLIC BLOOD PRESSURE: 80 MMHG | SYSTOLIC BLOOD PRESSURE: 118 MMHG | WEIGHT: 244 LBS | HEIGHT: 61 IN

## 2022-08-04 DIAGNOSIS — M54.2 NECK PAIN: ICD-10-CM

## 2022-08-04 DIAGNOSIS — M54.50 CHRONIC MIDLINE LOW BACK PAIN WITHOUT SCIATICA: Primary | ICD-10-CM

## 2022-08-04 DIAGNOSIS — R20.2 NUMBNESS AND TINGLING OF RIGHT ARM: ICD-10-CM

## 2022-08-04 DIAGNOSIS — R20.0 NUMBNESS AND TINGLING OF RIGHT ARM: ICD-10-CM

## 2022-08-04 DIAGNOSIS — G89.29 CHRONIC MIDLINE LOW BACK PAIN WITHOUT SCIATICA: Primary | ICD-10-CM

## 2022-08-04 DIAGNOSIS — M54.50 CHRONIC MIDLINE LOW BACK PAIN WITHOUT SCIATICA: ICD-10-CM

## 2022-08-04 DIAGNOSIS — G89.29 CHRONIC MIDLINE LOW BACK PAIN WITHOUT SCIATICA: ICD-10-CM

## 2022-08-04 PROCEDURE — 3079F DIAST BP 80-89 MM HG: CPT | Performed by: FAMILY MEDICINE

## 2022-08-04 PROCEDURE — 3008F BODY MASS INDEX DOCD: CPT | Performed by: FAMILY MEDICINE

## 2022-08-04 PROCEDURE — 99214 OFFICE O/P EST MOD 30 MIN: CPT | Performed by: FAMILY MEDICINE

## 2022-08-04 PROCEDURE — 72110 X-RAY EXAM L-2 SPINE 4/>VWS: CPT | Performed by: FAMILY MEDICINE

## 2022-08-04 PROCEDURE — 3074F SYST BP LT 130 MM HG: CPT | Performed by: FAMILY MEDICINE

## 2022-08-04 PROCEDURE — 72050 X-RAY EXAM NECK SPINE 4/5VWS: CPT | Performed by: FAMILY MEDICINE

## 2022-08-04 RX ORDER — METHYLPREDNISOLONE 4 MG/1
TABLET ORAL
Qty: 1 EACH | Refills: 0 | Status: SHIPPED | OUTPATIENT
Start: 2022-08-04

## 2022-08-05 RX ORDER — LISINOPRIL AND HYDROCHLOROTHIAZIDE 20; 12.5 MG/1; MG/1
1 TABLET ORAL DAILY
Qty: 90 TABLET | Refills: 0 | Status: SHIPPED | OUTPATIENT
Start: 2022-08-05

## 2022-08-05 RX ORDER — ZOLPIDEM TARTRATE 5 MG/1
5 TABLET ORAL NIGHTLY PRN
Qty: 30 TABLET | Refills: 0 | OUTPATIENT
Start: 2022-08-05

## 2022-08-14 RX ORDER — METFORMIN HYDROCHLORIDE 750 MG/1
TABLET, EXTENDED RELEASE ORAL
Qty: 30 TABLET | Refills: 0 | Status: SHIPPED | OUTPATIENT
Start: 2022-08-14

## 2022-08-24 ENCOUNTER — OFFICE VISIT (OUTPATIENT)
Dept: INTERNAL MEDICINE CLINIC | Facility: CLINIC | Age: 62
End: 2022-08-24
Payer: COMMERCIAL

## 2022-08-24 DIAGNOSIS — E66.01 CLASS 3 SEVERE OBESITY WITH SERIOUS COMORBIDITY AND BODY MASS INDEX (BMI) OF 45.0 TO 49.9 IN ADULT, UNSPECIFIED OBESITY TYPE (HCC): ICD-10-CM

## 2022-08-24 DIAGNOSIS — R73.03 PRE-DIABETES: ICD-10-CM

## 2022-08-24 DIAGNOSIS — Z98.84 H/O BARIATRIC SURGERY: ICD-10-CM

## 2022-08-24 DIAGNOSIS — M79.7 FIBROMYALGIA: ICD-10-CM

## 2022-08-24 DIAGNOSIS — I10 HYPERTENSION, UNSPECIFIED TYPE: ICD-10-CM

## 2022-08-24 PROCEDURE — 97802 MEDICAL NUTRITION INDIV IN: CPT | Performed by: DIETITIAN, REGISTERED

## 2022-08-25 VITALS — WEIGHT: 246 LBS | BODY MASS INDEX: 46 KG/M2

## 2022-09-02 RX ORDER — METFORMIN HYDROCHLORIDE 750 MG/1
TABLET, EXTENDED RELEASE ORAL
Qty: 30 TABLET | Refills: 0 | OUTPATIENT
Start: 2022-09-02

## 2022-09-02 NOTE — TELEPHONE ENCOUNTER
Requesting metformin  mg  LOV: 7/8/22  RTC: 4-8 weeks  Last Relevant Labs: 10/13/21  Filled: 8/14/22 #30 with 0 refills    Future Appointments   Date Time Provider Dianne Dial   9/9/2022  2:00 PM [facility]     Receipt confirmed at pharmacy requesting - denied with note as such - not due [facility] September

## 2022-09-22 ENCOUNTER — TELEPHONE (OUTPATIENT)
Dept: PHYSICAL THERAPY | Facility: HOSPITAL | Age: 62
End: 2022-09-22

## 2022-09-26 ENCOUNTER — APPOINTMENT (OUTPATIENT)
Dept: PHYSICAL THERAPY | Facility: HOSPITAL | Age: 62
End: 2022-09-26
Attending: FAMILY MEDICINE
Payer: COMMERCIAL

## 2022-09-26 ENCOUNTER — TELEPHONE (OUTPATIENT)
Dept: PHYSICAL THERAPY | Facility: HOSPITAL | Age: 62
End: 2022-09-26

## 2022-09-28 ENCOUNTER — OFFICE VISIT (OUTPATIENT)
Dept: PHYSICAL THERAPY | Facility: HOSPITAL | Age: 62
End: 2022-09-28
Attending: FAMILY MEDICINE
Payer: COMMERCIAL

## 2022-09-28 PROCEDURE — 97163 PT EVAL HIGH COMPLEX 45 MIN: CPT

## 2022-09-28 PROCEDURE — 97110 THERAPEUTIC EXERCISES: CPT

## 2022-10-03 ENCOUNTER — OFFICE VISIT (OUTPATIENT)
Dept: PHYSICAL THERAPY | Facility: HOSPITAL | Age: 62
End: 2022-10-03
Attending: FAMILY MEDICINE
Payer: COMMERCIAL

## 2022-10-03 PROCEDURE — 97110 THERAPEUTIC EXERCISES: CPT

## 2022-10-03 PROCEDURE — 97140 MANUAL THERAPY 1/> REGIONS: CPT

## 2022-10-05 ENCOUNTER — OFFICE VISIT (OUTPATIENT)
Dept: PHYSICAL THERAPY | Facility: HOSPITAL | Age: 62
End: 2022-10-05
Attending: FAMILY MEDICINE
Payer: COMMERCIAL

## 2022-10-05 PROCEDURE — 97140 MANUAL THERAPY 1/> REGIONS: CPT

## 2022-10-05 PROCEDURE — 97110 THERAPEUTIC EXERCISES: CPT

## 2022-10-10 ENCOUNTER — OFFICE VISIT (OUTPATIENT)
Dept: PHYSICAL THERAPY | Facility: HOSPITAL | Age: 62
End: 2022-10-10
Attending: FAMILY MEDICINE
Payer: COMMERCIAL

## 2022-10-10 PROCEDURE — 97140 MANUAL THERAPY 1/> REGIONS: CPT

## 2022-10-10 PROCEDURE — 97110 THERAPEUTIC EXERCISES: CPT

## 2022-10-17 ENCOUNTER — OFFICE VISIT (OUTPATIENT)
Dept: PHYSICAL THERAPY | Facility: HOSPITAL | Age: 62
End: 2022-10-17
Attending: FAMILY MEDICINE
Payer: COMMERCIAL

## 2022-10-17 PROCEDURE — 97110 THERAPEUTIC EXERCISES: CPT

## 2022-10-17 PROCEDURE — 97140 MANUAL THERAPY 1/> REGIONS: CPT

## 2022-10-17 NOTE — PROGRESS NOTES
Diagnosis:   Chronic LBP  Chronic neck pain      Referring Provider: Jamel Gomez  Date of Evaluation:   9/28/22    Precautions:  None Next MD visit:   none scheduled  Date of Surgery: n/a   Insurance Primary/Secondary: СЕРГЕЙ POS / N/A       # Auth Visits: [email or web address] ppo [id number]   Total Timed Treatment: 35 min       Total Treatment time: 35 min       Charges: TE (15) MT (15)       Treatment Number: 5/10    Subjective: Pt. Arrived 15 minutes late to appointment. States neck is doing ok today and back is bothering her more. States her shoulder is bothering her today too. Pain: 4/10 back    Objective/Goals: Refer to flow sheet. Due to being short time emphasis on low back this date. TE  nustep x 5 minutes  DKTCc SB x 10  LTR c SB x 10  Bridge c SB x 10    MT  STM lumbar paraspinals, QL, piriformis  Gr. III lumbar rotational mobs x 10 bouts      Goals: (to be met in 10 visits)      1. Improve cervical ROM by 2cm in all planes to allow patient to clear traffic with more ease. Progress     2. Improve parascapular strength by 1/2 grade to promote improved posture and body mechanics. progress     3. Decrease symptoms by 50% to allow patient to sleep for 6 hours without waking from pain. 4. Improve core stability by 1/2 grade to promote improved posture and body mechanics with bending and lifting. 5. Improve lumbar ROM by 2cm in all planes to allow patient to change positions with more ease. Progress     6. Improve B LE strength by 1/2 grade to allow patient to negotiate stairs with more ease. 7. Patient to demonstrate improved posture and body mechanics with bending/lifting up to 5 pounds. 8. Improve B LE HS flexibility by 5 degrees to decrease stress on lumbar/pelvic region. 9. Patient to be independent with HEP, joint protection principles, improved posture and body mechanics.     HEP: are in bold  Education: posture education, joint protection principles, body mechanics education    Assessment: Tolerated session well. Good response to  MT. Lumbar mobility improving. Pt. Reports dizziness with changing positions that comes and goes. Plan: Assess response to last session. Progress spinal stabilization with sand dune.

## 2022-10-17 NOTE — PROGRESS NOTES
Diagnosis:   Chronic LBP  Chronic neck pain      Referring Provider: Bipin Hernandez  Date of Evaluation:   9/28/22    Precautions:  None Next MD visit:   none scheduled  Date of Surgery: n/a   Insurance Primary/Secondary: СЕРГЕЙ POS / N/A       # Auth Visits: [email or web address] ppo [id number]   Total Timed Treatment: 45 min       Total Treatment time: 50 min       Charges: TE 2(30) MT (15)       Treatment Number: 4/10    Subjective: Neck more stiff today. Back not as bad. Pain: 4/10     Objective/Goals: Refer to flow sheet. Progressed spinal stabilization exercises. Progressed MT this date to improve cervical and lumbar mobility and decrease muscle guarding and tightness. Added supine on foam beam exercises. TE  nustep x 5 minutes  Wall wipes c SB x 10  Wall push ups x 10  A/P board x 10  Quad stretch on 8 inch step x 10  SLR x 10  airex   - step ups x 10   - fwd lunge x 10   - mini squat with ball squeeze x 10  Supine on foam beam   - chin tuck x 10   - bench press x 10   - oh flex x 10   - PPT c march x 10   - PPT c knee ext x 10   - PPT c opp arm/leg x 10  DKTCc SB x 10  LTR c SB x 10  Bridge c SB x 10  HS stretch c flossing x 20  UT stretch 10 sec x 10  Cervical AROM x 10  Shoulder rolls x 10    MT  STM cervical paraspinals, UT  STM lumbar paraspinals, QL, piriformis  Gr. III lumbar rotational mobs x 10 bouts  Cervical distraction 1 min x 3  Suboccipital release 1 min x 3    Goals: (to be met in 10 visits)      1. Improve cervical ROM by 2cm in all planes to allow patient to clear traffic with more ease. 2. Improve parascapular strength by 1/2 grade to promote improved posture and body mechanics. 3. Decrease symptoms by 50% to allow patient to sleep for 6 hours without waking from pain. 4. Improve core stability by 1/2 grade to promote improved posture and body mechanics with bending and lifting. 5. Improve lumbar ROM by 2cm in all planes to allow patient to change positions with more ease.      6. Improve B LE strength by 1/2 grade to allow patient to negotiate stairs with more ease. 7. Patient to demonstrate improved posture and body mechanics with bending/lifting up to 5 pounds. 8. Improve B LE HS flexibility by 5 degrees to decrease stress on lumbar/pelvic region. 9. Patient to be independent with HEP, joint protection principles, improved posture and body mechanics. HEP: are in bold  Education: posture education, joint protection principles, body mechanics education    Assessment: Tolerated session well. Good response to new exercises and MT. Cervical and lumbar mobility improving. Plan: Assess response to last session. Progress spinal stabilization with sand dune.

## 2022-10-17 NOTE — PROGRESS NOTES
Diagnosis:   Chronic LBP  Chronic neck pain      Referring Provider: Tae Claire  Date of Evaluation:   9/28/22    Precautions:  None Next MD visit:   none scheduled  Date of Surgery: n/a   Insurance Primary/Secondary: СЕРГЕЙ POS / N/A       # Auth Visits: сергей ppo [id number]   Total Timed Treatment: 45 min       Total Treatment time: 50 min       Charges: TE 2(30) MT (15)       Treatment Number: 2/10    Subjective: Pt. Reports 5-6/10 pain in neck and back today. Dog is not doing well so lots of bending and picking him up and cleaning up after him which is aggravating her neck and back. Pain: 5-6/10     Objective/Goals: Refer to flow sheet. Initiated spinal stabilization exercises. Initiated MT this date to improve cervical and lumbar mobility and decrease muscle guarding and tightness. TE  nustep x 5 minutes  Wall wipes c SB x 10  Wall push ups x 10  A/P board x 10  Quad stretch on 8 inch step x 10  SLR x 10  airex   - step ups x 10   - fwd lunge x 10   - mini squat with ball squeeze x 10  DKTC x 10  SKTC x 10  LTR x 10  Bridge x 10  HS stretch c flossing x 20  UT stretch 10 sec x 10  Cervical AROM x 10  Shoulder rolls x 10    MT  STM cervical paraspinals, UT  STM lumbar paraspinals, QL, piriformis  Gr. III lumbar rotational mobs x 10 bouts  Cervical distraction 1 min x 3  Suboccipital release 1 min x 3    Goals: (to be met in 10 visits)      1. Improve cervical ROM by 2cm in all planes to allow patient to clear traffic with more ease. 2. Improve parascapular strength by 1/2 grade to promote improved posture and body mechanics. 3. Decrease symptoms by 50% to allow patient to sleep for 6 hours without waking from pain. 4. Improve core stability by 1/2 grade to promote improved posture and body mechanics with bending and lifting. 5. Improve lumbar ROM by 2cm in all planes to allow patient to change positions with more ease.      6. Improve B LE strength by 1/2 grade to allow patient to negotiate stairs with more ease. 7. Patient to demonstrate improved posture and body mechanics with bending/lifting up to 5 pounds. 8. Improve B LE HS flexibility by 5 degrees to decrease stress on lumbar/pelvic region. 9. Patient to be independent with HEP, joint protection principles, improved posture and body mechanics. HEP: are in bold  Education: posture education, joint protection principles, body mechanics education    Assessment: Tolerated session well. Good response to new exercises and MT. Major muscle guarding and tightness cervical and lumbar paraspinals R > L. Plan: Assess response to last session. Add SB next session.

## 2022-10-17 NOTE — PROGRESS NOTES
Diagnosis:   Chronic LBP  Chronic neck pain      Referring Provider: Adrien Aguilar  Date of Evaluation:   9/28/22    Precautions:  None Next MD visit:   none scheduled  Date of Surgery: n/a   Insurance Primary/Secondary: СЕРГЕЙ POS / N/A       # Auth Visits: [email or web address] ppo [id number]   Total Timed Treatment: 45 min       Total Treatment time: 50 min       Charges: TE 2(30) MT (15)       Treatment Number: 3/10    Subjective: Pt. Reports 5/10 pain in neck and back today. Felt good after last session. In morning pain returns. Pain: 5/10     Objective/Goals: Refer to flow sheet. Progressed spinal stabilization exercises. Progressed MT this date to improve cervical and lumbar mobility and decrease muscle guarding and tightness. Added seated on SB exercises. TE  nustep x 5 minutes  Wall wipes c SB x 10  Wall push ups x 10  A/P board x 10  Quad stretch on 8 inch step x 10  SLR x 10  airex   - step ups x 10   - fwd lunge x 10   - mini squat with ball squeeze x 10  Seated on SB   - pelvic circles cw/ccw x 10   - seated flex stretch x 10   - arm lifts x 10   - march x 10   - knee ext x 10  DKTCc SB x 10  LTR c SB x 10  Bridge c SB x 10  HS stretch c flossing x 20  UT stretch 10 sec x 10  Cervical AROM x 10  Shoulder rolls x 10    MT  STM cervical paraspinals, UT  STM lumbar paraspinals, QL, piriformis  Gr. III lumbar rotational mobs x 10 bouts  Cervical distraction 1 min x 3  Suboccipital release 1 min x 3    Goals: (to be met in 10 visits)      1. Improve cervical ROM by 2cm in all planes to allow patient to clear traffic with more ease. 2. Improve parascapular strength by 1/2 grade to promote improved posture and body mechanics. 3. Decrease symptoms by 50% to allow patient to sleep for 6 hours without waking from pain. 4. Improve core stability by 1/2 grade to promote improved posture and body mechanics with bending and lifting. 5. Improve lumbar ROM by 2cm in all planes to allow patient to change positions with more ease. 6. Improve B LE strength by 1/2 grade to allow patient to negotiate stairs with more ease. 7. Patient to demonstrate improved posture and body mechanics with bending/lifting up to 5 pounds. 8. Improve B LE HS flexibility by 5 degrees to decrease stress on lumbar/pelvic region. 9. Patient to be independent with HEP, joint protection principles, improved posture and body mechanics. HEP: are in bold  Education: posture education, joint protection principles, body mechanics education    Assessment: Tolerated session well. Good response to new exercises and MT. Major muscle guarding and tightness cervical and lumbar paraspinals R > L. Plan: Assess response to last session.  Progress spinal stabilization with supine on foam beam.

## 2022-10-20 RX ORDER — LISINOPRIL AND HYDROCHLOROTHIAZIDE 20; 12.5 MG/1; MG/1
1 TABLET ORAL DAILY
Qty: 90 TABLET | Refills: 0 | Status: SHIPPED | OUTPATIENT
Start: 2022-10-20

## 2022-10-26 ENCOUNTER — APPOINTMENT (OUTPATIENT)
Dept: PHYSICAL THERAPY | Facility: HOSPITAL | Age: 62
End: 2022-10-26
Attending: FAMILY MEDICINE
Payer: COMMERCIAL

## 2022-11-02 ENCOUNTER — OFFICE VISIT (OUTPATIENT)
Dept: PHYSICAL THERAPY | Facility: HOSPITAL | Age: 62
End: 2022-11-02
Attending: FAMILY MEDICINE
Payer: COMMERCIAL

## 2022-11-02 PROCEDURE — 97110 THERAPEUTIC EXERCISES: CPT

## 2022-11-02 PROCEDURE — 97140 MANUAL THERAPY 1/> REGIONS: CPT

## 2022-11-02 NOTE — PROGRESS NOTES
Diagnosis:   Chronic LBP  Chronic neck pain      Referring Provider: Emelina Henderson  Date of Evaluation:   9/28/22    Precautions:  None Next MD visit:   none scheduled  Date of Surgery: n/a   Insurance Primary/Secondary: AMANDEEP POS / N/A       # Auth Visits: [email or web address] ppo [id number]   Total Timed Treatment: 35 min       Total Treatment time: 35 min       Charges: TE (15) MT (15)       Treatment Number: 6/10    Subjective: Pt. Arrived 15 minutes late to appointment. States neck is doing ok today and back is bothering her more. States her shoulder is bothering her today too. Has kinesio tape on her shoulder. Still has not followed up with her MD for her shoulder pain. States at night has been having tingling and itching in her R hand. States she will be gone for a week on vacation in [place]. Pain: 8/10 back, neck 6/10    Objective/Goals: Refer to flow sheet. Due to being short time emphasis on low back this date. Increased muscle guarding along R lower thoracic and upper lumbar paraspinals. TE  nustep x 5 minutes  DKTCc SB x 10  LTR c SB x 10      MT  R side up   STM lower thoracic and lumbar paraspinals, QL, piriformis  Gr. III lumbar rotational mobs x 10 bouts  Thoracic and lumbar PAs and unilateral mobs gr. II and III x 10 bouts      Goals: (to be met in 10 visits)      1. Improve cervical ROM by 2cm in all planes to allow patient to clear traffic with more ease. Progress     2. Improve parascapular strength by 1/2 grade to promote improved posture and body mechanics. progress     3. Decrease symptoms by 50% to allow patient to sleep for 6 hours without waking from pain. 4. Improve core stability by 1/2 grade to promote improved posture and body mechanics with bending and lifting. 5. Improve lumbar ROM by 2cm in all planes to allow patient to change positions with more ease. Progress     6. Improve B LE strength by 1/2 grade to allow patient to negotiate stairs with more ease.      7. Patient to demonstrate improved posture and body mechanics with bending/lifting up to 5 pounds. 8. Improve B LE HS flexibility by 5 degrees to decrease stress on lumbar/pelvic region. 9. Patient to be independent with HEP, joint protection principles, improved posture and body mechanics. HEP: are in bold  Education: posture education, joint protection principles, body mechanics education    Assessment: Tolerated session well. Good response to  MT. Lumbar mobility improving. Reports good but temporary relief of pain after sessions. Plan: Assess response to last session. Progress spinal stabilization with sand dune.

## 2022-11-09 ENCOUNTER — APPOINTMENT (OUTPATIENT)
Dept: PHYSICAL THERAPY | Facility: HOSPITAL | Age: 62
End: 2022-11-09
Attending: FAMILY MEDICINE
Payer: COMMERCIAL

## 2022-11-14 ENCOUNTER — OFFICE VISIT (OUTPATIENT)
Dept: PHYSICAL THERAPY | Facility: HOSPITAL | Age: 62
End: 2022-11-14
Attending: FAMILY MEDICINE
Payer: COMMERCIAL

## 2022-11-14 PROCEDURE — 97140 MANUAL THERAPY 1/> REGIONS: CPT

## 2022-11-14 PROCEDURE — 97110 THERAPEUTIC EXERCISES: CPT
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
## 2022-11-17 ENCOUNTER — TELEPHONE (OUTPATIENT)
Dept: INTERNAL MEDICINE CLINIC | Facility: CLINIC | Age: 62
End: 2022-11-17

## 2022-11-17 DIAGNOSIS — R73.03 PRE-DIABETES: Primary | ICD-10-CM

## 2022-11-17 DIAGNOSIS — Z13.0 SCREENING FOR BLOOD DISEASE: ICD-10-CM

## 2022-11-17 NOTE — TELEPHONE ENCOUNTER
Future Appointments   Date Time Provider Dianne Dial   11/30/2022  3:30 PM Todd Herman PA-C [facility]     Orders to edjessie- Pt informed that labs need to be completed no sooner than 2 weeks prior to the appt.  Pt aware to fast-no call back required    Pt already had labs this year-if more needed please CALL her and let her know to get done

## 2022-11-22 ENCOUNTER — APPOINTMENT (OUTPATIENT)
Dept: PHYSICAL THERAPY | Facility: HOSPITAL | Age: 62
End: 2022-11-22
Payer: COMMERCIAL

## 2022-11-22 NOTE — TELEPHONE ENCOUNTER
Attempted to call patient;phone keeps ringing no voicemail    CB ordered additional labs to complete before appt.

## 2022-11-29 ENCOUNTER — OFFICE VISIT (OUTPATIENT)
Dept: RHEUMATOLOGY | Facility: CLINIC | Age: 62
End: 2022-11-29
Payer: COMMERCIAL

## 2022-11-29 VITALS
DIASTOLIC BLOOD PRESSURE: 80 MMHG | HEIGHT: 61 IN | HEART RATE: 84 BPM | RESPIRATION RATE: 16 BRPM | SYSTOLIC BLOOD PRESSURE: 135 MMHG | WEIGHT: 233 LBS | OXYGEN SATURATION: 98 % | BODY MASS INDEX: 43.99 KG/M2

## 2022-11-29 DIAGNOSIS — G89.29 NECK PAIN, CHRONIC: Primary | ICD-10-CM

## 2022-11-29 DIAGNOSIS — Z96.651 STATUS POST TOTAL RIGHT KNEE REPLACEMENT: ICD-10-CM

## 2022-11-29 DIAGNOSIS — M17.12 PRIMARY OSTEOARTHRITIS OF LEFT KNEE: ICD-10-CM

## 2022-11-29 DIAGNOSIS — E66.01 CLASS 3 SEVERE OBESITY DUE TO EXCESS CALORIES WITHOUT SERIOUS COMORBIDITY WITH BODY MASS INDEX (BMI) OF 40.0 TO 44.9 IN ADULT (HCC): ICD-10-CM

## 2022-11-29 DIAGNOSIS — M79.7 FIBROMYALGIA: ICD-10-CM

## 2022-11-29 DIAGNOSIS — M47.12 OSTEOARTHRITIS OF CERVICAL SPINE WITH MYELOPATHY: ICD-10-CM

## 2022-11-29 DIAGNOSIS — M54.2 NECK PAIN, CHRONIC: Primary | ICD-10-CM

## 2022-11-29 DIAGNOSIS — E66.01 MORBID OBESITY WITH BMI OF 40.0-44.9, ADULT (HCC): ICD-10-CM

## 2022-11-29 PROBLEM — M25.561 ACUTE PAIN OF RIGHT KNEE: Status: RESOLVED | Noted: 2019-03-17 | Resolved: 2022-11-29

## 2022-11-29 PROCEDURE — 3079F DIAST BP 80-89 MM HG: CPT | Performed by: INTERNAL MEDICINE

## 2022-11-29 PROCEDURE — 99205 OFFICE O/P NEW HI 60 MIN: CPT | Performed by: INTERNAL MEDICINE

## 2022-11-29 PROCEDURE — 3075F SYST BP GE 130 - 139MM HG: CPT | Performed by: INTERNAL MEDICINE

## 2022-11-29 PROCEDURE — 3008F BODY MASS INDEX DOCD: CPT | Performed by: INTERNAL MEDICINE

## 2022-11-29 RX ORDER — HYDROCODONE BITARTRATE AND ACETAMINOPHEN 5; 325 MG/1; MG/1
1 TABLET ORAL EVERY 6 HOURS PRN
Qty: 90 TABLET | Refills: 0 | Status: SHIPPED | OUTPATIENT
Start: 2022-11-29

## 2022-11-29 NOTE — PATIENT INSTRUCTIONS
You have degenerative changes in your cervical spine. This causes neck pain and arm pain. You have pinched nerves in the neck. Take Naproxen 500 mg twice a day. Occasional Xanax for stress. Take cyclobenzaprine a muscle relaxant also twice a day as needed. Use a heating pad if it helps. Go for physical therapy. For severe pain take Norco 5 mg 1 twice a day if needed. The Monique Gilman is a narcotic, it can be taken with naproxen and cyclobenzaprine but it could make you sleepy. Monique Gilman can be addictive so do not overdo it. Use your Ambien for sleep at night. Try to do intermittent fasting so he can lose more weight. Additional medicine we could use in the future would be either gabapentin, Neurontin, or Lyrica, these medicines blocking nerve pain. If your neck pain persists we could refer you to [facility] or [facility] pain service. Pain doctors could give you pain injections right into the neck. Some patients get benefit from acupuncture, having needles placed into the neck. You can use over-the-counter creams and salves like Aspercreme, diclofenac gel, IcyHot and apply these and see if they help. Light massage and heating pads can also help. Do your MRI cervical spine in December. Return to office 1 month.

## 2022-11-30 ENCOUNTER — OFFICE VISIT (OUTPATIENT)
Dept: INTERNAL MEDICINE CLINIC | Facility: CLINIC | Age: 62
End: 2022-11-30
Payer: COMMERCIAL

## 2022-11-30 ENCOUNTER — OFFICE VISIT (OUTPATIENT)
Dept: PHYSICAL THERAPY | Facility: HOSPITAL | Age: 62
End: 2022-11-30
Attending: FAMILY MEDICINE
Payer: COMMERCIAL

## 2022-11-30 VITALS
SYSTOLIC BLOOD PRESSURE: 124 MMHG | RESPIRATION RATE: 16 BRPM | WEIGHT: 233 LBS | HEIGHT: 61 IN | BODY MASS INDEX: 43.99 KG/M2 | DIASTOLIC BLOOD PRESSURE: 68 MMHG | OXYGEN SATURATION: 98 % | HEART RATE: 80 BPM

## 2022-11-30 DIAGNOSIS — I10 PRIMARY HYPERTENSION: ICD-10-CM

## 2022-11-30 DIAGNOSIS — E66.01 CLASS 3 SEVERE OBESITY DUE TO EXCESS CALORIES WITHOUT SERIOUS COMORBIDITY WITH BODY MASS INDEX (BMI) OF 40.0 TO 44.9 IN ADULT (HCC): ICD-10-CM

## 2022-11-30 DIAGNOSIS — J01.10 ACUTE NON-RECURRENT FRONTAL SINUSITIS: ICD-10-CM

## 2022-11-30 DIAGNOSIS — R73.03 PRE-DIABETES: ICD-10-CM

## 2022-11-30 DIAGNOSIS — Z90.710 HISTORY OF TOTAL ABDOMINAL HYSTERECTOMY: ICD-10-CM

## 2022-11-30 DIAGNOSIS — M79.7 FIBROMYALGIA: ICD-10-CM

## 2022-11-30 DIAGNOSIS — Z12.31 VISIT FOR SCREENING MAMMOGRAM: ICD-10-CM

## 2022-11-30 DIAGNOSIS — Z00.00 ANNUAL PHYSICAL EXAM: Primary | ICD-10-CM

## 2022-11-30 PROCEDURE — 3008F BODY MASS INDEX DOCD: CPT | Performed by: PHYSICIAN ASSISTANT

## 2022-11-30 PROCEDURE — 97110 THERAPEUTIC EXERCISES: CPT

## 2022-11-30 PROCEDURE — 3078F DIAST BP <80 MM HG: CPT | Performed by: PHYSICIAN ASSISTANT

## 2022-11-30 PROCEDURE — 99396 PREV VISIT EST AGE 40-64: CPT | Performed by: PHYSICIAN ASSISTANT

## 2022-11-30 PROCEDURE — 3074F SYST BP LT 130 MM HG: CPT | Performed by: PHYSICIAN ASSISTANT

## 2022-11-30 PROCEDURE — 97140 MANUAL THERAPY 1/> REGIONS: CPT

## 2022-11-30 RX ORDER — AZITHROMYCIN 250 MG/1
TABLET, FILM COATED ORAL
Qty: 6 TABLET | Refills: 0 | Status: SHIPPED | OUTPATIENT
Start: 2022-11-30 | End: 2022-12-05

## 2022-11-30 RX ORDER — PREGABALIN 50 MG/1
CAPSULE ORAL
COMMUNITY
Start: 2022-09-16

## 2022-11-30 NOTE — PROGRESS NOTES
Diagnosis:   Chronic LBP  Chronic neck pain      Referring Provider: No ref. provider found  Date of Evaluation:   9/28/22    Precautions:  None Next MD visit:   none scheduled  Date of Surgery: n/a   Insurance Primary/Secondary: СЕРГЕЙ POS / N/A       # Auth Visits: сергей ppo [id number]   Total Timed Treatment: 45 min       Total Treatment time: 50 min       Charges: TE2 (30) MT (15)       Treatment Number: 8/10    Subjective: Pt. States she has been in pain everywhere. Saw her ortho and got injection in L knee. Also got orders for MRI for her neck and shoulder. Pt. Also saw her rheumatologist who wants her to get blood work done due to her c/o pain/burning all over. N & T comes and goes down R UE to hand. Pain: 6/10 back, neck 8/10    Objective/Goals: Refer to flow sheet. Progressed spinal stabilization exercises this date. MT to help decrease muscle guarding, pain and tightness. TE  nustep x 5 minutes  Wall wipes c SB x 10  Wall push ups x 10    Seated flexion stretch  DKTC c SB x 10  LTR c SB x 10    MT  R side up   STM thoracic paraspinals  Gr. III lumbar rotational mobs x 10 bouts  Thoracic and lumbar PAs and unilateral mobs gr. II and III x 10 bouts  STM cervical musculature  Suboccipital release 1 min x 3  Cervical distraction 1 min x 3    Goals: (to be met in 10 visits)      1. Improve cervical ROM by 2cm in all planes to allow patient to clear traffic with more ease. Progress     2. Improve parascapular strength by 1/2 grade to promote improved posture and body mechanics. progress     3. Decrease symptoms by 50% to allow patient to sleep for 6 hours without waking from pain. 4. Improve core stability by 1/2 grade to promote improved posture and body mechanics with bending and lifting. 5. Improve lumbar ROM by 2cm in all planes to allow patient to change positions with more ease. Progress     6. Improve B LE strength by 1/2 grade to allow patient to negotiate stairs with more ease.      7. Patient to demonstrate improved posture and body mechanics with bending/lifting up to 5 pounds. 8. Improve B LE HS flexibility by 5 degrees to decrease stress on lumbar/pelvic region. 9. Patient to be independent with HEP, joint protection principles, improved posture and body mechanics. HEP: are in bold  Education: posture education, joint protection principles, body mechanics education    Assessment: Tolerated session well. Good response to  MT. Lumbar mobility improving. Increased tightness noted today in R upper traps. Needs cues for posture. Requires UE support for standing exercises. Plan: Assess response to last session. Progress core stabilization as tolerated.

## 2022-12-01 ENCOUNTER — APPOINTMENT (OUTPATIENT)
Dept: PHYSICAL THERAPY | Facility: HOSPITAL | Age: 62
End: 2022-12-01
Payer: COMMERCIAL

## 2022-12-06 ENCOUNTER — TELEPHONE (OUTPATIENT)
Dept: RHEUMATOLOGY | Facility: CLINIC | Age: 62
End: 2022-12-06

## 2022-12-14 ENCOUNTER — OFFICE VISIT (OUTPATIENT)
Dept: PHYSICAL THERAPY | Facility: HOSPITAL | Age: 62
End: 2022-12-14
Attending: INTERNAL MEDICINE
Payer: COMMERCIAL

## 2022-12-14 PROCEDURE — 97110 THERAPEUTIC EXERCISES: CPT

## 2022-12-14 PROCEDURE — 97140 MANUAL THERAPY 1/> REGIONS: CPT

## 2022-12-19 NOTE — PROGRESS NOTES
Diagnosis:   Chronic LBP  Chronic neck pain      Referring Provider: No ref. provider found  Date of Evaluation:   9/28/22    Precautions:  None Next MD visit:   none scheduled  Date of Surgery: n/a   Insurance Primary/Secondary: СЕРГЕЙ POS / N/A       # Auth Visits: сергей ppo -[id number]   Total Timed Treatment: 45 min       Total Treatment time: 50 min       Charges: TE2 (30) MT (15)       Treatment Number: 9/10    Subjective: Pt. States she has been in pain everywhere. Will be following up with her ortho about possible shoulder surgery. Pain: 6/10 back, neck 8/10    Objective/Goals: Refer to flow sheet. Progressed spinal stabilization exercises this date. MT to help decrease muscle guarding, pain and tightness. TE  nustep x 5 minutes  Wall wipes c SB x 10  Wall push ups x 10  Sand dune   - step ups x 10   -fwd lunge x 10   -lat lunge x 10   - mini squat x 10    Seated flexion stretch  DKTC c SB x 10  LTR c SB x 10    MT  R side up   STM thoracic paraspinals  Gr. III lumbar rotational mobs x 10 bouts  Thoracic and lumbar PAs and unilateral mobs gr. II and III x 10 bouts  STM cervical musculature  Suboccipital release 1 min x 3  Cervical distraction 1 min x 3    Goals: (to be met in 10 visits)      1. Improve cervical ROM by 2cm in all planes to allow patient to clear traffic with more ease. Progress     2. Improve parascapular strength by 1/2 grade to promote improved posture and body mechanics. progress     3. Decrease symptoms by 50% to allow patient to sleep for 6 hours without waking from pain. 4. Improve core stability by 1/2 grade to promote improved posture and body mechanics with bending and lifting. 5. Improve lumbar ROM by 2cm in all planes to allow patient to change positions with more ease. Progress     6. Improve B LE strength by 1/2 grade to allow patient to negotiate stairs with more ease. 7. Patient to demonstrate improved posture and body mechanics with bending/lifting up to 5 pounds. 8. Improve B LE HS flexibility by 5 degrees to decrease stress on lumbar/pelvic region. 9. Patient to be independent with HEP, joint protection principles, improved posture and body mechanics. HEP: are in bold  Education: posture education, joint protection principles, body mechanics education    Assessment: Tolerated session well. Good response to  MT. Lumbar mobility improving. Needs cues for posture. Requires UE support for standing exercises. Plan: Reassess next visit. Review/update HEP for discharge. Pt. To follow up with ortho and spine specialist for pain management alternatives.

## 2022-12-20 ENCOUNTER — LAB ENCOUNTER (OUTPATIENT)
Dept: LAB | Age: 62
End: 2022-12-20
Attending: INTERNAL MEDICINE
Payer: COMMERCIAL

## 2022-12-20 DIAGNOSIS — M79.7 FIBROMYALGIA: ICD-10-CM

## 2022-12-20 DIAGNOSIS — G89.29 NECK PAIN, CHRONIC: Primary | ICD-10-CM

## 2022-12-20 DIAGNOSIS — M54.2 NECK PAIN, CHRONIC: Primary | ICD-10-CM

## 2022-12-20 DIAGNOSIS — M19.91 PRIMARY OSTEOARTHRITIS: ICD-10-CM

## 2022-12-20 DIAGNOSIS — M17.12 PRIMARY OSTEOARTHRITIS OF LEFT KNEE: ICD-10-CM

## 2022-12-20 LAB
CRP SERPL-MCNC: 1.05 MG/DL (ref ?–0.3)
ERYTHROCYTE [SEDIMENTATION RATE] IN BLOOD: 57 MM/HR
RHEUMATOID FACT SERPL-ACNC: <10 IU/ML (ref ?–15)

## 2022-12-20 PROCEDURE — 86140 C-REACTIVE PROTEIN: CPT | Performed by: INTERNAL MEDICINE

## 2022-12-20 PROCEDURE — 86431 RHEUMATOID FACTOR QUANT: CPT | Performed by: INTERNAL MEDICINE

## 2022-12-20 PROCEDURE — 86038 ANTINUCLEAR ANTIBODIES: CPT | Performed by: INTERNAL MEDICINE

## 2022-12-20 PROCEDURE — 86200 CCP ANTIBODY: CPT | Performed by: INTERNAL MEDICINE

## 2022-12-20 PROCEDURE — 85652 RBC SED RATE AUTOMATED: CPT | Performed by: INTERNAL MEDICINE

## 2022-12-22 LAB — NUCLEAR IGG TITR SER IF: NEGATIVE {TITER}

## 2022-12-23 LAB — CCP IGG SERPL-ACNC: 0.5 U/ML (ref 0–6.9)

## 2022-12-28 ENCOUNTER — APPOINTMENT (OUTPATIENT)
Dept: PHYSICAL THERAPY | Facility: HOSPITAL | Age: 62
End: 2022-12-28
Attending: FAMILY MEDICINE
Payer: COMMERCIAL

## 2022-12-28 PROCEDURE — 97140 MANUAL THERAPY 1/> REGIONS: CPT

## 2022-12-28 PROCEDURE — 97110 THERAPEUTIC EXERCISES: CPT

## 2023-01-03 NOTE — PROGRESS NOTES
Dear Dr. Perico Tolbert MD,    This letter is to inform you of [facility] L[facility] in Physical Therapy at . [place] [id number] and Sports Medicine. DX: Chronic LBP  Chronic neck pain       TREATMENT #    10   Of   10      DATE OF SERVICE: 12/28/22    ASSESSMENT  Patient reports overall 70% improvement. States 5-9/10 pain. Patient reports she is independent with current HEP and will continue on her own at this time. OTHER  Pain: 5-9/10 back and neck  Neuro Screen: reports R LE radicular symptoms to hip with standing/walking > 5 minutes, sensation intact to light touch; Reports numnbess down R UE to fingers.       Cervical/Lumbar AROM: (* denotes performed with pain)   *pain with all movements  Flexion: 0cm/8cm  Extension: 18cm/8cm  Sidebending: R 10cm/46cm; L 11cm/46cm  Rotation: R 14cm/70cm; L 12cm/70cm     Accessory motion: hypomobile throughout cervical, thoracic, and lumbar intervertebrals  Palpation: Moderate muscle guarding, tightness, and spasms noted throughout cervical musculature and paraspinals, throughout lumbar musculature, QL, piriformis and paraspinals     Strength: (* denotes performed with pain)  Core: upper and lower abs 2+/5, back ext 3+/5  UE/Scapular LE   Shoulder Flex: R 4-/5, L 4+/5  Shoulder ABD (C5): R 4-/5, L 4+/5  Biceps (C6): R 4-/5, L 4/5  Wrist ext (C6): R 4-/5, L 4/5  Triceps (C7): R 4-/5, L 4/5  Wrist Flex (C7): R 4-/5, L 4/5  Digit Flex (C8): R 4-/5, L 4/5  Thumb Ext (C8): R 4-/5, L 4/5  Interossei (T1): R 4-/5, L 4/5     Rhomboids: R 3/5, L 3+/5  Mid trap: R 3/5; L 3+/5  Lats: R 3+/5, L 3+/5  Low trap: R 3-/5; L 3/5      Strength: R 40lbs; L 42 lbs Hip flexion (L2): R 4-/5; L 4-/5  Hip abduction: R 4/5; L 4/5  Hip Extension: R 3/5; L 3/5            Hip ER: R 4/5; L 4/5  Hip IR: R 4/5; L 4/5  Knee Flexion: R 4/5; L 4/5            Knee extension (L3): R 4/5; L 4/5            DF (L4): R 4/5; L 4/5  Great Toe Ext (L5): R 4/5, L 4/5  PF (S1): R 4/5; L 4/5      Flexibility:   UE/Scapular LE   Upper Trap: R major; L major  Levator Scap: R major; L major  Pec Major: R major; L major  Scalenes: R major, L major Hip Flexor: Rmajor, L major  Hamstrings: R -40; L -40  Piriformis: R major; L major  Quads: R mod; L mod  Gastroc-soleus: R mod; L mod      Special tests:   (+) slump test R > L; (+) cervical compression/distraction; (+) R SB c OP    Gait: pt ambulates on level ground with mild antalgia, decreased step length L, decreased stance phase R, decreased hip/knee flex  R> L, decreased foot clearance R> L, decreased arm swing, stooped posture/forward lean  Balance: SLS R 8 sec, L 8 sec    LONG AND SHORT TERM GOALS  Goals: (to be met in 10 visits)      1. Improve cervical ROM by 2cm in all planes to allow patient to clear traffic with more ease. Met     2. Improve parascapular strength by 1/2 grade to promote improved posture and body mechanics. Not met - limited by R shoulder pain and weakness, states she will be having shoulder surgery in future     3. Decrease symptoms by 50% to allow patient to sleep for 6 hours without waking from pain. Met     4. Improve core stability by 1/2 grade to promote improved posture and body mechanics with bending and lifting. Met     5. Improve lumbar ROM by 2cm in all planes to allow patient to change positions with more ease. Met     6. Improve B LE strength by 1/2 grade to allow patient to negotiate stairs with more ease. Not met     7. Patient to demonstrate improved posture and body mechanics with bending/lifting up to 5 pounds. Met     8. Improve B LE HS flexibility by 5 degrees to decrease stress on lumbar/pelvic region. Met     9. Patient to be independent with HEP, joint protection principles, improved posture and body mechanics. Met        RECOMMENDATIONS AND PLAN OF TREATMENT  Discharge patient. Patient to continue independently with current HEP.     REHAB POTENTIAL  Good    Patient/family/caregiver was advised of these findings, precautions, and treatment options and has agreed to actively participate in planning and for this course of care. [address] YOU FOR THE REFERRAL OF Bony Porter. IF YOU HAVE ANY QUESTIONS PLEASE CONTACT ME AT: [phone]          SINCERELY,           Jamie Cotton, PT    PHYSICIAN'S CERTIFICATION REQUIRED: No  I certify the need for these services furnished under this plan of treatment and while under my care. X_________________________________________________ Date: ____________________    CERTIFICATION FROM : 12/28/22 to 12/31/22    Thank you for the referral of Bony Porter. Please sign plan of care and return by fax.     Fax #Maritza Villafuerte [phone]  [id number]

## 2023-01-18 ENCOUNTER — OFFICE VISIT (OUTPATIENT)
Dept: RHEUMATOLOGY | Facility: CLINIC | Age: 63
End: 2023-01-18
Payer: COMMERCIAL

## 2023-01-18 VITALS
DIASTOLIC BLOOD PRESSURE: 64 MMHG | WEIGHT: 233 LBS | BODY MASS INDEX: 43.99 KG/M2 | SYSTOLIC BLOOD PRESSURE: 110 MMHG | RESPIRATION RATE: 16 BRPM | OXYGEN SATURATION: 97 % | HEIGHT: 61 IN | HEART RATE: 81 BPM

## 2023-01-18 DIAGNOSIS — M47.12 OSTEOARTHRITIS OF CERVICAL SPINE WITH MYELOPATHY: ICD-10-CM

## 2023-01-18 DIAGNOSIS — M79.7 FIBROMYALGIA: Primary | ICD-10-CM

## 2023-01-18 DIAGNOSIS — M17.11 PRIMARY OSTEOARTHRITIS OF RIGHT KNEE: ICD-10-CM

## 2023-01-18 DIAGNOSIS — Z96.651 STATUS POST TOTAL RIGHT KNEE REPLACEMENT: ICD-10-CM

## 2023-01-18 DIAGNOSIS — R70.0 ELEVATED SED RATE: ICD-10-CM

## 2023-01-18 DIAGNOSIS — G89.29 CHRONIC PAIN OF BOTH KNEES: ICD-10-CM

## 2023-01-18 DIAGNOSIS — M25.561 CHRONIC PAIN OF BOTH KNEES: ICD-10-CM

## 2023-01-18 DIAGNOSIS — M25.562 CHRONIC PAIN OF BOTH KNEES: ICD-10-CM

## 2023-01-18 DIAGNOSIS — M17.12 PRIMARY OSTEOARTHRITIS OF LEFT KNEE: ICD-10-CM

## 2023-01-18 PROCEDURE — 3074F SYST BP LT 130 MM HG: CPT | Performed by: INTERNAL MEDICINE

## 2023-01-18 PROCEDURE — 3078F DIAST BP <80 MM HG: CPT | Performed by: INTERNAL MEDICINE

## 2023-01-18 PROCEDURE — 99214 OFFICE O/P EST MOD 30 MIN: CPT | Performed by: INTERNAL MEDICINE

## 2023-01-18 PROCEDURE — 3008F BODY MASS INDEX DOCD: CPT | Performed by: INTERNAL MEDICINE

## 2023-01-18 RX ORDER — DICLOFENAC SODIUM 75 MG/1
75 TABLET, DELAYED RELEASE ORAL 2 TIMES DAILY
Qty: 60 TABLET | Refills: 2 | Status: SHIPPED | OUTPATIENT
Start: 2023-01-18

## 2023-01-18 RX ORDER — HYDROCODONE BITARTRATE AND ACETAMINOPHEN 5; 325 MG/1; MG/1
1 TABLET ORAL EVERY 6 HOURS PRN
Qty: 100 TABLET | Refills: 0 | Status: SHIPPED | OUTPATIENT
Start: 2023-01-18

## 2023-01-18 RX ORDER — LISINOPRIL AND HYDROCHLOROTHIAZIDE 20; 12.5 MG/1; MG/1
1 TABLET ORAL DAILY
Qty: 90 TABLET | Refills: 0 | Status: SHIPPED | OUTPATIENT
Start: 2023-01-18

## 2023-01-18 NOTE — PATIENT INSTRUCTIONS
Change antiinflammatory to diclofenac 75 mg twice a day in place on Naproxen. Norco for severe joint pain use one or two per day. Elevated sed rate suggests some inflammation present. Not working since 2019 - right knee replaced in 2019. Left knee pain - might need replacemnt soon. Recheck sed rate and crp labs in 10 days. Right shoulder needs surgery too. Unable to work due to multiple arthritis problems. Return to office 2 months.

## 2023-01-25 ENCOUNTER — TELEPHONE (OUTPATIENT)
Dept: INTERNAL MEDICINE CLINIC | Facility: CLINIC | Age: 63
End: 2023-01-25

## 2023-01-25 NOTE — TELEPHONE ENCOUNTER
Requesting Metformin  mg  LOV: 7/8/22  RTC: 4-8 weeks  Last Relevant Labs: 10/13/21  Filled: 8/14/22 #30 with 0 refills    Future Appointments   Date Time Provider Dianne Yojana   9/8/2842 30:15 AM Annette Bear MD [facility] [place]   4/14/2023  1:00 PM Veronica Pena PA-C [facility]

## 2023-01-25 NOTE — TELEPHONE ENCOUNTER
Patient called and needs refill on     Metformin  MG Oral Tablet 24 hr    [id number] - [place], [place] - [address] AT [facility] OF [facility] [address], [phone], [phone]    Please call patient at [phone]

## 2023-03-10 ENCOUNTER — LAB ENCOUNTER (OUTPATIENT)
Dept: LAB | Age: 63
End: 2023-03-10
Attending: INTERNAL MEDICINE
Payer: COMMERCIAL

## 2023-03-10 DIAGNOSIS — M79.7 FIBROMYALGIA: ICD-10-CM

## 2023-03-10 DIAGNOSIS — M25.562 CHRONIC PAIN OF BOTH KNEES: ICD-10-CM

## 2023-03-10 DIAGNOSIS — G89.29 CHRONIC PAIN OF BOTH KNEES: ICD-10-CM

## 2023-03-10 DIAGNOSIS — R70.0 ELEVATED SED RATE: ICD-10-CM

## 2023-03-10 DIAGNOSIS — M25.561 CHRONIC PAIN OF BOTH KNEES: ICD-10-CM

## 2023-03-10 DIAGNOSIS — Z96.651 STATUS POST TOTAL RIGHT KNEE REPLACEMENT: ICD-10-CM

## 2023-03-10 DIAGNOSIS — M47.12 OSTEOARTHRITIS OF CERVICAL SPINE WITH MYELOPATHY: ICD-10-CM

## 2023-03-10 LAB
CRP SERPL-MCNC: 1.47 MG/DL (ref ?–0.3)
ERYTHROCYTE [SEDIMENTATION RATE] IN BLOOD: 65 MM/HR

## 2023-03-10 PROCEDURE — 86140 C-REACTIVE PROTEIN: CPT

## 2023-03-10 PROCEDURE — 36415 COLL VENOUS BLD VENIPUNCTURE: CPT

## 2023-03-10 PROCEDURE — 85652 RBC SED RATE AUTOMATED: CPT

## 2023-03-23 ENCOUNTER — TELEPHONE (OUTPATIENT)
Dept: INTERNAL MEDICINE CLINIC | Facility: CLINIC | Age: 63
End: 2023-03-23

## 2023-03-23 ENCOUNTER — OFFICE VISIT (OUTPATIENT)
Dept: INTERNAL MEDICINE CLINIC | Facility: CLINIC | Age: 63
End: 2023-03-23
Payer: COMMERCIAL

## 2023-03-23 VITALS
SYSTOLIC BLOOD PRESSURE: 130 MMHG | OXYGEN SATURATION: 97 % | DIASTOLIC BLOOD PRESSURE: 66 MMHG | HEART RATE: 68 BPM | BODY MASS INDEX: 44.52 KG/M2 | WEIGHT: 235.81 LBS | RESPIRATION RATE: 14 BRPM | HEIGHT: 61 IN

## 2023-03-23 DIAGNOSIS — J01.00 ACUTE NON-RECURRENT MAXILLARY SINUSITIS: ICD-10-CM

## 2023-03-23 DIAGNOSIS — M79.7 FIBROMYALGIA: Primary | ICD-10-CM

## 2023-03-23 PROCEDURE — 3078F DIAST BP <80 MM HG: CPT | Performed by: PHYSICIAN ASSISTANT

## 2023-03-23 PROCEDURE — 99213 OFFICE O/P EST LOW 20 MIN: CPT | Performed by: PHYSICIAN ASSISTANT

## 2023-03-23 PROCEDURE — 3075F SYST BP GE 130 - 139MM HG: CPT | Performed by: PHYSICIAN ASSISTANT

## 2023-03-23 PROCEDURE — 3008F BODY MASS INDEX DOCD: CPT | Performed by: PHYSICIAN ASSISTANT

## 2023-03-23 RX ORDER — AMOXICILLIN AND CLAVULANATE POTASSIUM 875; 125 MG/1; MG/1
1 TABLET, FILM COATED ORAL 2 TIMES DAILY
Qty: 20 TABLET | Refills: 0 | Status: SHIPPED | OUTPATIENT
Start: 2023-03-23 | End: 2023-04-02

## 2023-04-03 NOTE — TELEPHONE ENCOUNTER
Called to obtain details for disab. Home phone goes to busy tone. Called mobile number on file and lvm to call us back.

## 2023-04-03 NOTE — TELEPHONE ENCOUNTER
Returned pt's call. Pt states she has been off of work since 2018 due to fibromyalgia. Other factors due to her permanent disab is her double shoulders surgeries and knee replacement.  Pt will  forms once completed

## 2023-04-04 DIAGNOSIS — Z96.651 STATUS POST TOTAL RIGHT KNEE REPLACEMENT: ICD-10-CM

## 2023-04-04 DIAGNOSIS — G89.29 CHRONIC PAIN OF BOTH KNEES: ICD-10-CM

## 2023-04-04 DIAGNOSIS — M79.7 FIBROMYALGIA: ICD-10-CM

## 2023-04-04 DIAGNOSIS — M47.12 OSTEOARTHRITIS OF CERVICAL SPINE WITH MYELOPATHY: ICD-10-CM

## 2023-04-04 DIAGNOSIS — M25.561 CHRONIC PAIN OF BOTH KNEES: ICD-10-CM

## 2023-04-04 DIAGNOSIS — R70.0 ELEVATED SED RATE: ICD-10-CM

## 2023-04-04 DIAGNOSIS — M25.562 CHRONIC PAIN OF BOTH KNEES: ICD-10-CM

## 2023-04-04 RX ORDER — LISINOPRIL AND HYDROCHLOROTHIAZIDE 20; 12.5 MG/1; MG/1
1 TABLET ORAL DAILY
Qty: 90 TABLET | Refills: 0 | Status: SHIPPED | OUTPATIENT
Start: 2023-04-04

## 2023-04-04 RX ORDER — DICLOFENAC SODIUM 75 MG/1
TABLET, DELAYED RELEASE ORAL
Qty: 60 TABLET | Refills: 2 | Status: SHIPPED | OUTPATIENT
Start: 2023-04-04

## 2023-04-04 NOTE — TELEPHONE ENCOUNTER
Lisinopril-HCTZ 20/12.5mg filled #90/0R    LOV 3/23/23, F/U  CPE 11/30/22    CMP 3/14/22    Protocol failed, CMP/BMP due  Rx pending

## 2023-04-04 NOTE — TELEPHONE ENCOUNTER
Future Appointments   Date Time Provider Dianne Dial   4/14/2023  1:00 PM Damian PROCTOR [facility]       LOV   1/18/2023    Last refill  1/18/2023  60 tabs, 2 refills

## 2023-04-04 NOTE — TELEPHONE ENCOUNTER
Dr. Donald Benitez,    Pt has filed for permanent disability. She states she has been out of work since 2018 due to fibromyalgia and other factors due to double shoulder surgeries and knee replacement. Do you support her request?    Thank you,    Lupis Rojas.

## 2023-04-05 NOTE — TELEPHONE ENCOUNTER
Relayed message below to pt. Informed pt to submit forms to her specialist. Pt requested a refund on her $25 payment. Emailed Echo Mcfadden to please refund pt. Forms archived.

## 2023-04-07 NOTE — TELEPHONE ENCOUNTER
[facility] sent as per below but please also call to remind patient to have these done. Jeff Shetty,  We wanted to remind you to please have the ordered labwork done. These were orders in November for you to do. Please fast 10-12 hours prior to lab draw. You can walk in or call to schedule a time at [phone] to set a time the works for you. Any questions please let us know.      Thanks,  Blessing Guzman

## 2023-04-21 ENCOUNTER — OFFICE VISIT (OUTPATIENT)
Dept: INTERNAL MEDICINE CLINIC | Facility: CLINIC | Age: 63
End: 2023-04-21
Payer: COMMERCIAL

## 2023-04-21 VITALS
SYSTOLIC BLOOD PRESSURE: 130 MMHG | WEIGHT: 237 LBS | OXYGEN SATURATION: 98 % | HEART RATE: 80 BPM | BODY MASS INDEX: 45.33 KG/M2 | HEIGHT: 60.5 IN | DIASTOLIC BLOOD PRESSURE: 70 MMHG | RESPIRATION RATE: 16 BRPM

## 2023-04-21 DIAGNOSIS — I10 PRIMARY HYPERTENSION: ICD-10-CM

## 2023-04-21 DIAGNOSIS — Z51.81 ENCOUNTER FOR THERAPEUTIC DRUG LEVEL MONITORING: Primary | ICD-10-CM

## 2023-04-21 DIAGNOSIS — E78.2 MIXED HYPERLIPIDEMIA: ICD-10-CM

## 2023-04-21 DIAGNOSIS — Z98.84 H/O BARIATRIC SURGERY: ICD-10-CM

## 2023-04-21 DIAGNOSIS — E66.01 CLASS 3 SEVERE OBESITY WITH SERIOUS COMORBIDITY AND BODY MASS INDEX (BMI) OF 45.0 TO 49.9 IN ADULT, UNSPECIFIED OBESITY TYPE (HCC): ICD-10-CM

## 2023-04-21 DIAGNOSIS — E88.81 METABOLIC SYNDROME: ICD-10-CM

## 2023-04-21 DIAGNOSIS — R73.03 PREDIABETES: ICD-10-CM

## 2023-04-21 PROCEDURE — 3078F DIAST BP <80 MM HG: CPT | Performed by: PHYSICIAN ASSISTANT

## 2023-04-21 PROCEDURE — 99214 OFFICE O/P EST MOD 30 MIN: CPT | Performed by: PHYSICIAN ASSISTANT

## 2023-04-21 PROCEDURE — 3008F BODY MASS INDEX DOCD: CPT | Performed by: PHYSICIAN ASSISTANT

## 2023-04-21 PROCEDURE — 3075F SYST BP GE 130 - 139MM HG: CPT | Performed by: PHYSICIAN ASSISTANT

## 2023-04-21 RX ORDER — METFORMIN HYDROCHLORIDE 750 MG/1
750 TABLET, EXTENDED RELEASE ORAL DAILY
Qty: 90 TABLET | Refills: 1 | Status: SHIPPED | OUTPATIENT
Start: 2023-04-21

## 2023-04-21 RX ORDER — SEMAGLUTIDE 0.68 MG/ML
0.5 INJECTION, SOLUTION SUBCUTANEOUS WEEKLY
Qty: 3 ML | Refills: 0 | Status: SHIPPED | OUTPATIENT
Start: 2023-04-21

## 2023-04-21 RX ORDER — NAPROXEN 500 MG/1
500 TABLET ORAL 2 TIMES DAILY PRN
COMMUNITY
Start: 2023-02-26

## 2023-07-03 RX ORDER — LISINOPRIL AND HYDROCHLOROTHIAZIDE 20; 12.5 MG/1; MG/1
1 TABLET ORAL DAILY
Qty: 90 TABLET | Refills: 0 | Status: SHIPPED | OUTPATIENT
Start: 2023-07-03

## 2023-08-04 RX ORDER — LISINOPRIL AND HYDROCHLOROTHIAZIDE 20; 12.5 MG/1; MG/1
1 TABLET ORAL DAILY
Qty: 90 TABLET | Refills: 0 | Status: SHIPPED | OUTPATIENT
Start: 2023-08-04

## 2023-08-04 NOTE — TELEPHONE ENCOUNTER
Requested Prescriptions     Pending Prescriptions Disp Refills    LISINOPRIL-HYDROCHLOROTHIAZIDE 20-12.5 MG Oral Tab [Pharmacy Med Name: LISINOPRIL-HCTZ 20/12.5MG TABLETS] 90 tablet 0     Sig: TAKE 1 TABLET BY MOUTH DAILY       LOV: 3--cb-problem visit    LAST CPE: 11--cb-physical    Last Labs: 3---lipid,cmp,tsh    Last Refill: 7-3-2023-- 90 tabs with 0 refills

## 2023-10-03 RX ORDER — PANTOPRAZOLE SODIUM 40 MG/1
40 TABLET, DELAYED RELEASE ORAL
Qty: 90 TABLET | Refills: 2 | Status: SHIPPED | OUTPATIENT
Start: 2023-10-03

## 2023-10-03 RX ORDER — LISINOPRIL AND HYDROCHLOROTHIAZIDE 20; 12.5 MG/1; MG/1
1 TABLET ORAL DAILY
Qty: 90 TABLET | Refills: 0 | Status: SHIPPED | OUTPATIENT
Start: 2023-10-03

## 2023-10-03 NOTE — TELEPHONE ENCOUNTER
Hypertension Medications Protocol Vnplmx48/01/2023 05:55 AM   Protocol Details CMP or BMP in past 12 months    Appointment in past 6 or next 3 months    Last serum creatinine< 2.0          Last visit problem visit 3/23/23    Last annual 11/30/2022

## 2023-11-01 ENCOUNTER — TELEPHONE (OUTPATIENT)
Dept: INTERNAL MEDICINE CLINIC | Facility: CLINIC | Age: 63
End: 2023-11-01

## 2023-11-01 DIAGNOSIS — Z13.228 SCREENING FOR METABOLIC DISORDER: ICD-10-CM

## 2023-11-01 DIAGNOSIS — Z00.00 ROUTINE GENERAL MEDICAL EXAMINATION AT A HEALTH CARE FACILITY: Primary | ICD-10-CM

## 2023-11-01 DIAGNOSIS — Z13.29 SCREENING FOR THYROID DISORDER: ICD-10-CM

## 2023-11-01 DIAGNOSIS — Z13.0 SCREENING FOR DISORDER OF BLOOD AND BLOOD-FORMING ORGANS: ICD-10-CM

## 2023-11-01 DIAGNOSIS — Z13.220 SCREENING FOR LIPID DISORDERS: ICD-10-CM

## 2023-11-01 NOTE — TELEPHONE ENCOUNTER
Orders to   [facility]            Pt aware to get labs done no sooner than 2 weeks prior to the appt. Pt aware to fast.  No call back required.     Future Appointments   Date Time Provider Dianne Dial   12/7/2023 11:30 AM Karissa Munoz PA-C [facility]

## 2023-11-15 ENCOUNTER — TELEPHONE (OUTPATIENT)
Dept: INTERNAL MEDICINE CLINIC | Facility: CLINIC | Age: 63
End: 2023-11-15

## 2023-11-15 NOTE — TELEPHONE ENCOUNTER
Patient called for refill of Metformin    Requesting Metformin  LOV: 4/21/23  RTC: not noted  Last Relevant Labs: 10/13/21  Filled: 4/21/23/ #90 with 1 refills  3/8/2024 12:40 PM MARCOS Presley [facility]

## 2023-11-28 ENCOUNTER — HOSPITAL ENCOUNTER (OUTPATIENT)
Dept: MAMMOGRAPHY | Facility: HOSPITAL | Age: 63
Discharge: HOME | End: 2023-11-28
Attending: PHYSICIAN ASSISTANT
Payer: COMMERCIAL

## 2023-11-28 DIAGNOSIS — Z12.31 VISIT FOR SCREENING MAMMOGRAM: ICD-10-CM

## 2023-11-28 PROCEDURE — 77063 BREAST TOMOSYNTHESIS BI: CPT | Performed by: PHYSICIAN ASSISTANT

## 2023-11-28 PROCEDURE — 77067 SCR MAMMO BI INCL CAD: CPT | Performed by: PHYSICIAN ASSISTANT

## 2023-11-30 ENCOUNTER — OFFICE VISIT (OUTPATIENT)
Dept: RHEUMATOLOGY | Facility: CLINIC | Age: 63
End: 2023-11-30
Payer: COMMERCIAL

## 2023-11-30 VITALS
HEIGHT: 60.5 IN | DIASTOLIC BLOOD PRESSURE: 78 MMHG | HEART RATE: 99 BPM | WEIGHT: 230.13 LBS | SYSTOLIC BLOOD PRESSURE: 130 MMHG | RESPIRATION RATE: 16 BRPM | OXYGEN SATURATION: 96 % | BODY MASS INDEX: 44.02 KG/M2 | TEMPERATURE: 97 F

## 2023-11-30 DIAGNOSIS — M19.042 PRIMARY OSTEOARTHRITIS OF BOTH HANDS: ICD-10-CM

## 2023-11-30 DIAGNOSIS — E66.01 MORBID OBESITY WITH BMI OF 40.0-44.9, ADULT (HCC): ICD-10-CM

## 2023-11-30 DIAGNOSIS — M19.041 PRIMARY OSTEOARTHRITIS OF BOTH HANDS: ICD-10-CM

## 2023-11-30 DIAGNOSIS — M17.12 PRIMARY OSTEOARTHRITIS OF LEFT KNEE: Primary | ICD-10-CM

## 2023-11-30 DIAGNOSIS — M79.7 FIBROMYALGIA: ICD-10-CM

## 2023-11-30 DIAGNOSIS — M47.12 OSTEOARTHRITIS OF CERVICAL SPINE WITH MYELOPATHY: ICD-10-CM

## 2023-11-30 PROCEDURE — 3078F DIAST BP <80 MM HG: CPT | Performed by: INTERNAL MEDICINE

## 2023-11-30 PROCEDURE — 3075F SYST BP GE 130 - 139MM HG: CPT | Performed by: INTERNAL MEDICINE

## 2023-11-30 PROCEDURE — 3008F BODY MASS INDEX DOCD: CPT | Performed by: INTERNAL MEDICINE

## 2023-11-30 PROCEDURE — 99214 OFFICE O/P EST MOD 30 MIN: CPT | Performed by: INTERNAL MEDICINE

## 2023-11-30 RX ORDER — HYDROCODONE BITARTRATE AND ACETAMINOPHEN 5; 325 MG/1; MG/1
1 TABLET ORAL EVERY 6 HOURS PRN
Qty: 120 TABLET | Refills: 0 | Status: CANCELLED | OUTPATIENT
Start: 2023-11-30 | End: 2023-12-30

## 2023-11-30 RX ORDER — ERGOCALCIFEROL 1.25 MG/1
50000 CAPSULE ORAL WEEKLY
COMMUNITY
Start: 2023-07-17 | End: 2023-11-30

## 2023-11-30 RX ORDER — HYDROCODONE BITARTRATE AND ACETAMINOPHEN 5; 325 MG/1; MG/1
1 TABLET ORAL EVERY 6 HOURS PRN
Qty: 100 TABLET | Refills: 0 | Status: SHIPPED | OUTPATIENT
Start: 2023-11-30

## 2023-11-30 RX ORDER — NAPROXEN 500 MG/1
500 TABLET ORAL 2 TIMES DAILY PRN
Qty: 180 TABLET | Refills: 1 | Status: SHIPPED | OUTPATIENT
Start: 2023-11-30

## 2023-11-30 RX ORDER — HYDROCODONE BITARTRATE AND ACETAMINOPHEN 5; 325 MG/1; MG/1
1 TABLET ORAL EVERY 6 HOURS PRN
Qty: 120 TABLET | Refills: 0 | Status: CANCELLED | OUTPATIENT
Start: 2023-12-31 | End: 2024-01-30

## 2023-11-30 RX ORDER — HYDROCODONE BITARTRATE AND ACETAMINOPHEN 5; 325 MG/1; MG/1
1 TABLET ORAL EVERY 6 HOURS PRN
Qty: 120 TABLET | Refills: 0 | Status: CANCELLED | OUTPATIENT
Start: 2024-01-31 | End: 2024-03-01

## 2023-11-30 RX ORDER — CYCLOBENZAPRINE HCL 10 MG
10 TABLET ORAL 3 TIMES DAILY PRN
Qty: 90 TABLET | Refills: 1 | Status: SHIPPED | OUTPATIENT
Start: 2023-11-30

## 2023-11-30 RX ORDER — ALBUTEROL SULFATE 90 UG/1
1 AEROSOL, METERED RESPIRATORY (INHALATION) EVERY 4 HOURS PRN
COMMUNITY
Start: 2023-11-17

## 2023-11-30 NOTE — PATIENT INSTRUCTIONS
Napoxen 500 mg twice a day for arthritis. Norco'5 mg as needed once a day for pain control  Cyclobenzaprine 10 mg as needed for muscle spasm. Return to office 6 months.

## 2023-12-01 ENCOUNTER — TELEPHONE (OUTPATIENT)
Dept: RHEUMATOLOGY | Facility: CLINIC | Age: 63
End: 2023-12-01

## 2023-12-02 DIAGNOSIS — I10 PRIMARY HYPERTENSION: Primary | ICD-10-CM

## 2023-12-04 ENCOUNTER — LAB ENCOUNTER (OUTPATIENT)
Dept: LAB | Age: 63
End: 2023-12-04
Attending: INTERNAL MEDICINE
Payer: COMMERCIAL

## 2023-12-04 DIAGNOSIS — Z00.00 ROUTINE GENERAL MEDICAL EXAMINATION AT A HEALTH CARE FACILITY: ICD-10-CM

## 2023-12-04 DIAGNOSIS — Z13.228 SCREENING FOR METABOLIC DISORDER: ICD-10-CM

## 2023-12-04 DIAGNOSIS — Z13.29 SCREENING FOR THYROID DISORDER: ICD-10-CM

## 2023-12-04 DIAGNOSIS — Z13.0 SCREENING FOR DISORDER OF BLOOD AND BLOOD-FORMING ORGANS: ICD-10-CM

## 2023-12-04 DIAGNOSIS — Z13.220 SCREENING FOR LIPID DISORDERS: ICD-10-CM

## 2023-12-04 LAB
ALBUMIN SERPL-MCNC: 3.5 G/DL (ref 3.4–5)
ALBUMIN/GLOB SERPL: 0.9 {RATIO} (ref 1–2)
ALP LIVER SERPL-CCNC: 98 U/L
ALT SERPL-CCNC: 20 U/L
ANION GAP SERPL CALC-SCNC: 5 MMOL/L (ref 0–18)
AST SERPL-CCNC: 17 U/L (ref 15–37)
BASOPHILS # BLD AUTO: 0.11 X10(3) UL (ref 0–0.2)
BASOPHILS NFR BLD AUTO: 0.9 %
BILIRUB SERPL-MCNC: 0.4 MG/DL (ref 0.1–2)
BUN BLD-MCNC: 18 MG/DL (ref 9–23)
CALCIUM BLD-MCNC: 9 MG/DL (ref 8.5–10.1)
CHLORIDE SERPL-SCNC: 103 MMOL/L (ref 98–112)
CHOLEST SERPL-MCNC: 197 MG/DL (ref ?–200)
CO2 SERPL-SCNC: 27 MMOL/L (ref 21–32)
CREAT BLD-MCNC: 0.91 MG/DL
EGFRCR SERPLBLD CKD-EPI 2021: 71 ML/MIN/1.73M2 (ref 60–?)
EOSINOPHIL # BLD AUTO: 0.25 X10(3) UL (ref 0–0.7)
EOSINOPHIL NFR BLD AUTO: 2 %
ERYTHROCYTE [DISTWIDTH] IN BLOOD BY AUTOMATED COUNT: 14.1 %
FASTING PATIENT LIPID ANSWER: YES
FASTING STATUS PATIENT QL REPORTED: YES
GLOBULIN PLAS-MCNC: 3.7 G/DL (ref 2.8–4.4)
GLUCOSE BLD-MCNC: 91 MG/DL (ref 70–99)
HCT VFR BLD AUTO: 41.2 %
HDLC SERPL-MCNC: 56 MG/DL (ref 40–59)
HGB BLD-MCNC: 13.4 G/DL
IMM GRANULOCYTES # BLD AUTO: 0.05 X10(3) UL (ref 0–1)
IMM GRANULOCYTES NFR BLD: 0.4 %
LDLC SERPL CALC-MCNC: 123 MG/DL (ref ?–100)
LYMPHOCYTES # BLD AUTO: 4.98 X10(3) UL (ref 1–4)
LYMPHOCYTES NFR BLD AUTO: 40.3 %
MCH RBC QN AUTO: 27.4 PG (ref 26–34)
MCHC RBC AUTO-ENTMCNC: 32.5 G/DL (ref 31–37)
MCV RBC AUTO: 84.3 FL
MONOCYTES # BLD AUTO: 0.92 X10(3) UL (ref 0.1–1)
MONOCYTES NFR BLD AUTO: 7.4 %
NEUTROPHILS # BLD AUTO: 6.06 X10 (3) UL (ref 1.5–7.7)
NEUTROPHILS # BLD AUTO: 6.06 X10(3) UL (ref 1.5–7.7)
NEUTROPHILS NFR BLD AUTO: 49 %
NONHDLC SERPL-MCNC: 141 MG/DL (ref ?–130)
OSMOLALITY SERPL CALC.SUM OF ELEC: 281 MOSM/KG (ref 275–295)
PLATELET # BLD AUTO: 411 10(3)UL (ref 150–450)
POTASSIUM SERPL-SCNC: 4.5 MMOL/L (ref 3.5–5.1)
PROT SERPL-MCNC: 7.2 G/DL (ref 6.4–8.2)
RBC # BLD AUTO: 4.89 X10(6)UL
SODIUM SERPL-SCNC: 135 MMOL/L (ref 136–145)
TRIGL SERPL-MCNC: 102 MG/DL (ref 30–149)
TSI SER-ACNC: 0.94 MIU/ML (ref 0.36–3.74)
VLDLC SERPL CALC-MCNC: 18 MG/DL (ref 0–30)
WBC # BLD AUTO: 12.4 X10(3) UL (ref 4–11)

## 2023-12-04 PROCEDURE — 80050 GENERAL HEALTH PANEL: CPT | Performed by: INTERNAL MEDICINE

## 2023-12-04 PROCEDURE — 80061 LIPID PANEL: CPT | Performed by: INTERNAL MEDICINE

## 2023-12-05 DIAGNOSIS — D72.820 LYMPHOCYTOSIS: ICD-10-CM

## 2023-12-05 DIAGNOSIS — E87.1 HYPONATREMIA: Primary | ICD-10-CM

## 2023-12-05 RX ORDER — LISINOPRIL AND HYDROCHLOROTHIAZIDE 20; 12.5 MG/1; MG/1
1 TABLET ORAL DAILY
Qty: 90 TABLET | Refills: 0 | Status: SHIPPED | OUTPATIENT
Start: 2023-12-05

## 2023-12-05 RX ORDER — LISINOPRIL AND HYDROCHLOROTHIAZIDE 20; 12.5 MG/1; MG/1
1 TABLET ORAL DAILY
Qty: 90 TABLET | Refills: 0 | OUTPATIENT
Start: 2023-12-05

## 2023-12-13 ENCOUNTER — TELEPHONE (OUTPATIENT)
Dept: RHEUMATOLOGY | Facility: CLINIC | Age: 63
End: 2023-12-13

## 2023-12-13 RX ORDER — HYDROCODONE BITARTRATE AND ACETAMINOPHEN 5; 325 MG/1; MG/1
1 TABLET ORAL EVERY 6 HOURS PRN
Qty: 120 TABLET | Refills: 0 | Status: SHIPPED | OUTPATIENT
Start: 2023-12-13

## 2023-12-18 ENCOUNTER — LAB ENCOUNTER (OUTPATIENT)
Dept: LAB | Age: 63
End: 2023-12-18
Attending: PHYSICIAN ASSISTANT
Payer: COMMERCIAL

## 2023-12-18 DIAGNOSIS — E87.1 HYPONATREMIA: ICD-10-CM

## 2023-12-18 DIAGNOSIS — D72.820 LYMPHOCYTOSIS: ICD-10-CM

## 2023-12-18 LAB
ANION GAP SERPL CALC-SCNC: 4 MMOL/L (ref 0–18)
BASOPHILS # BLD AUTO: 0.07 X10(3) UL (ref 0–0.2)
BASOPHILS NFR BLD AUTO: 0.7 %
BUN BLD-MCNC: 17 MG/DL (ref 9–23)
CALCIUM BLD-MCNC: 9.5 MG/DL (ref 8.5–10.1)
CHLORIDE SERPL-SCNC: 102 MMOL/L (ref 98–112)
CO2 SERPL-SCNC: 30 MMOL/L (ref 21–32)
CREAT BLD-MCNC: 0.82 MG/DL
EGFRCR SERPLBLD CKD-EPI 2021: 80 ML/MIN/1.73M2 (ref 60–?)
EOSINOPHIL # BLD AUTO: 0.18 X10(3) UL (ref 0–0.7)
EOSINOPHIL NFR BLD AUTO: 1.9 %
ERYTHROCYTE [DISTWIDTH] IN BLOOD BY AUTOMATED COUNT: 14.1 %
FASTING STATUS PATIENT QL REPORTED: YES
GLUCOSE BLD-MCNC: 86 MG/DL (ref 70–99)
HCT VFR BLD AUTO: 40.4 %
HGB BLD-MCNC: 13.6 G/DL
IMM GRANULOCYTES # BLD AUTO: 0.02 X10(3) UL (ref 0–1)
IMM GRANULOCYTES NFR BLD: 0.2 %
LYMPHOCYTES # BLD AUTO: 3.74 X10(3) UL (ref 1–4)
LYMPHOCYTES NFR BLD AUTO: 39.7 %
MCH RBC QN AUTO: 27.9 PG (ref 26–34)
MCHC RBC AUTO-ENTMCNC: 33.7 G/DL (ref 31–37)
MCV RBC AUTO: 83 FL
MONOCYTES # BLD AUTO: 0.66 X10(3) UL (ref 0.1–1)
MONOCYTES NFR BLD AUTO: 7 %
NEUTROPHILS # BLD AUTO: 4.76 X10 (3) UL (ref 1.5–7.7)
NEUTROPHILS # BLD AUTO: 4.76 X10(3) UL (ref 1.5–7.7)
NEUTROPHILS NFR BLD AUTO: 50.5 %
OSMOLALITY SERPL CALC.SUM OF ELEC: 283 MOSM/KG (ref 275–295)
PLATELET # BLD AUTO: 343 10(3)UL (ref 150–450)
POTASSIUM SERPL-SCNC: 4 MMOL/L (ref 3.5–5.1)
RBC # BLD AUTO: 4.87 X10(6)UL
SODIUM SERPL-SCNC: 136 MMOL/L (ref 136–145)
WBC # BLD AUTO: 9.4 X10(3) UL (ref 4–11)

## 2023-12-18 PROCEDURE — 85025 COMPLETE CBC W/AUTO DIFF WBC: CPT | Performed by: PHYSICIAN ASSISTANT

## 2023-12-18 PROCEDURE — 80048 BASIC METABOLIC PNL TOTAL CA: CPT | Performed by: PHYSICIAN ASSISTANT

## 2023-12-21 ENCOUNTER — OFFICE VISIT (OUTPATIENT)
Dept: INTERNAL MEDICINE CLINIC | Facility: CLINIC | Age: 63
End: 2023-12-21
Payer: COMMERCIAL

## 2023-12-21 VITALS
HEART RATE: 95 BPM | SYSTOLIC BLOOD PRESSURE: 120 MMHG | DIASTOLIC BLOOD PRESSURE: 68 MMHG | HEIGHT: 60.04 IN | BODY MASS INDEX: 44.18 KG/M2 | TEMPERATURE: 97 F | OXYGEN SATURATION: 97 % | WEIGHT: 228 LBS

## 2023-12-21 DIAGNOSIS — E78.00 HIGH CHOLESTEROL: ICD-10-CM

## 2023-12-21 DIAGNOSIS — Z00.00 ANNUAL PHYSICAL EXAM: Primary | ICD-10-CM

## 2023-12-21 DIAGNOSIS — J01.10 ACUTE NON-RECURRENT FRONTAL SINUSITIS: ICD-10-CM

## 2023-12-21 DIAGNOSIS — I10 PRIMARY HYPERTENSION: ICD-10-CM

## 2023-12-21 DIAGNOSIS — R73.03 PRE-DIABETES: ICD-10-CM

## 2023-12-21 DIAGNOSIS — Z78.0 POST-MENOPAUSAL: ICD-10-CM

## 2023-12-21 DIAGNOSIS — M79.7 FIBROMYALGIA: ICD-10-CM

## 2023-12-21 DIAGNOSIS — Z90.710 HISTORY OF TOTAL ABDOMINAL HYSTERECTOMY: ICD-10-CM

## 2023-12-21 PROBLEM — Z78.9 STATIN INTOLERANCE: Status: ACTIVE | Noted: 2023-12-21

## 2023-12-21 PROCEDURE — 3008F BODY MASS INDEX DOCD: CPT | Performed by: PHYSICIAN ASSISTANT

## 2023-12-21 PROCEDURE — 3074F SYST BP LT 130 MM HG: CPT | Performed by: PHYSICIAN ASSISTANT

## 2023-12-21 PROCEDURE — 99396 PREV VISIT EST AGE 40-64: CPT | Performed by: PHYSICIAN ASSISTANT

## 2023-12-21 PROCEDURE — 3078F DIAST BP <80 MM HG: CPT | Performed by: PHYSICIAN ASSISTANT

## 2023-12-21 RX ORDER — AMOXICILLIN AND CLAVULANATE POTASSIUM 875; 125 MG/1; MG/1
1 TABLET, FILM COATED ORAL 2 TIMES DAILY
Qty: 20 TABLET | Refills: 0 | Status: SHIPPED | OUTPATIENT
Start: 2023-12-21 | End: 2023-12-31

## 2024-01-09 ENCOUNTER — TELEPHONE (OUTPATIENT)
Dept: INTERNAL MEDICINE CLINIC | Facility: CLINIC | Age: 64
End: 2024-01-09

## 2024-01-09 NOTE — TELEPHONE ENCOUNTER
LOV 12/21/23     Called and spoke w/ pt. Pt saw CB in December, had cough and drainage. Pt stated she did end up taking abx, finished complete 10 day course. Pt stated she is still sick and abx didn't really help. Stated this past week cough has worsened. Covid negative on 1/6/24. Pt stated she has a lot of mucous, tired all the time and winded with activities and will have to sit down. Denies headache. Stated her ribs hurt from coughing so much. Drinking tea and taking Mucinex. Scheduled OV tomorrow with CB 1/10/24. Pt verbalizes understanding and is agreeable to plan.     ALMA ANDRADE

## 2024-01-10 ENCOUNTER — OFFICE VISIT (OUTPATIENT)
Dept: INTERNAL MEDICINE CLINIC | Facility: CLINIC | Age: 64
End: 2024-01-10
Payer: COMMERCIAL

## 2024-01-10 ENCOUNTER — LAB ENCOUNTER (OUTPATIENT)
Dept: LAB | Age: 64
End: 2024-01-10
Attending: PHYSICIAN ASSISTANT
Payer: COMMERCIAL

## 2024-01-10 ENCOUNTER — HOSPITAL ENCOUNTER (OUTPATIENT)
Dept: GENERAL RADIOLOGY | Age: 64
Discharge: HOME | End: 2024-01-10
Attending: PHYSICIAN ASSISTANT
Payer: COMMERCIAL

## 2024-01-10 VITALS
OXYGEN SATURATION: 95 % | WEIGHT: 228 LBS | SYSTOLIC BLOOD PRESSURE: 114 MMHG | RESPIRATION RATE: 16 BRPM | HEIGHT: 60.5 IN | TEMPERATURE: 98 F | HEART RATE: 78 BPM | DIASTOLIC BLOOD PRESSURE: 74 MMHG | BODY MASS INDEX: 43.61 KG/M2

## 2024-01-10 DIAGNOSIS — R05.1 ACUTE COUGH: ICD-10-CM

## 2024-01-10 DIAGNOSIS — R05.1 ACUTE COUGH: Primary | ICD-10-CM

## 2024-01-10 LAB
BASOPHILS # BLD AUTO: 0.08 X10(3) UL (ref 0–0.2)
BASOPHILS NFR BLD AUTO: 0.8 %
EOSINOPHIL # BLD AUTO: 0.11 X10(3) UL (ref 0–0.7)
EOSINOPHIL NFR BLD AUTO: 1.1 %
ERYTHROCYTE [DISTWIDTH] IN BLOOD BY AUTOMATED COUNT: 14.5 %
HCT VFR BLD AUTO: 39.7 %
HGB BLD-MCNC: 13 G/DL
IMM GRANULOCYTES # BLD AUTO: 0.05 X10(3) UL (ref 0–1)
IMM GRANULOCYTES NFR BLD: 0.5 %
LYMPHOCYTES # BLD AUTO: 4.23 X10(3) UL (ref 1–4)
LYMPHOCYTES NFR BLD AUTO: 43 %
MCH RBC QN AUTO: 27.4 PG (ref 26–34)
MCHC RBC AUTO-ENTMCNC: 32.7 G/DL (ref 31–37)
MCV RBC AUTO: 83.8 FL
MONOCYTES # BLD AUTO: 1.02 X10(3) UL (ref 0.1–1)
MONOCYTES NFR BLD AUTO: 10.4 %
NEUTROPHILS # BLD AUTO: 4.34 X10 (3) UL (ref 1.5–7.7)
NEUTROPHILS # BLD AUTO: 4.34 X10(3) UL (ref 1.5–7.7)
NEUTROPHILS NFR BLD AUTO: 44.2 %
PLATELET # BLD AUTO: 394 10(3)UL (ref 150–450)
RBC # BLD AUTO: 4.74 X10(6)UL
WBC # BLD AUTO: 9.8 X10(3) UL (ref 4–11)

## 2024-01-10 PROCEDURE — 85025 COMPLETE CBC W/AUTO DIFF WBC: CPT

## 2024-01-10 PROCEDURE — 3008F BODY MASS INDEX DOCD: CPT | Performed by: PHYSICIAN ASSISTANT

## 2024-01-10 PROCEDURE — 3078F DIAST BP <80 MM HG: CPT | Performed by: PHYSICIAN ASSISTANT

## 2024-01-10 PROCEDURE — 3074F SYST BP LT 130 MM HG: CPT | Performed by: PHYSICIAN ASSISTANT

## 2024-01-10 PROCEDURE — 99213 OFFICE O/P EST LOW 20 MIN: CPT | Performed by: PHYSICIAN ASSISTANT

## 2024-01-10 PROCEDURE — 71046 X-RAY EXAM CHEST 2 VIEWS: CPT | Performed by: PHYSICIAN ASSISTANT

## 2024-01-10 RX ORDER — PREDNISONE 10 MG/1
TABLET ORAL
Qty: 30 TABLET | Refills: 0 | Status: SHIPPED | OUTPATIENT
Start: 2024-01-10 | End: 2024-01-22

## 2024-01-10 RX ORDER — CODEINE PHOSPHATE AND GUAIFENESIN 10; 100 MG/5ML; MG/5ML
5 SOLUTION ORAL EVERY 6 HOURS PRN
Qty: 118 ML | Refills: 0 | Status: SHIPPED | OUTPATIENT
Start: 2024-01-10

## 2024-01-10 RX ORDER — ZOLPIDEM TARTRATE 6.25 MG/1
6.25 TABLET, FILM COATED, EXTENDED RELEASE ORAL NIGHTLY PRN
Qty: 30 TABLET | Refills: 1 | Status: CANCELLED | OUTPATIENT
Start: 2024-01-10

## 2024-01-10 RX ORDER — ALBUTEROL SULFATE 90 UG/1
2 AEROSOL, METERED RESPIRATORY (INHALATION) EVERY 4 HOURS PRN
Qty: 1 EACH | Refills: 0 | Status: SHIPPED | OUTPATIENT
Start: 2024-01-10

## 2024-01-10 NOTE — PROGRESS NOTES
Chief Complaint   Patient presents with    Cough       Room # 6 KB    Care Gap Management       Due for tdap;shingles;flu shot       HPI:  Pt presents with c/o continuing to feel poorly.  I saw pt on 12/21/23 and gave her 10 days of Augmentin for sinusitis.  She states she never improved.  Cough is productive of yellow sputum, nose.sinuses very productive as well.  Does not feel like typical sinusitis.  Back pain on R side with cough, tender to palp.  Cough is keeping pt up at night at times.  Mild WOO, + fatigue.  Talking, laughing, deep breaths and \"scents\" (ie detergent) can increase her cough.  Pt does feel she is wheezing at times.  No fever, Tmax 99.6 deg F.  Pt has an albuterol inhaler that she has tried, it did provide some relief to her cough.  Cough is keeping her up at night.     is sick as well.  Pt took COVID test at home,  it was negative.      Review of Systems   See HPI.  No other complaints today.    Past Medical History:   Diagnosis Date    Arthritis     Diverticulosis of large intestine     Esophageal reflux     Fibroid 2003    Fibromyalgia     Glaucoma     High blood pressure     HIGH CHOLESTEROL     HYPERTENSION     Osteoarthritis     Ovarian cyst 2003       Patient Active Problem List   Diagnosis    Pernicious anemia    HTN (hypertension)    Morbid obesity with BMI of 40.0-44.9, adult (HCC)    Fibromyalgia    Primary osteoarthritis of left knee    Rotator cuff tear arthropathy of both shoulders    Primary osteoarthritis of right knee/R TKR sx 2-13-19/Global Exp 5-13-19/TRK/EP    Status post total right knee replacement    Class 3 severe obesity due to excess calories without serious comorbidity with body mass index (BMI) of 40.0 to 44.9 in adult (HCC)    Chronic pain of both knees    Pre-diabetes    History of total abdominal hysterectomy    Osteoarthritis of cervical spine with myelopathy    Primary osteoarthritis of both hands    Statin intolerance    High cholesterol       Current  Outpatient Medications   Medication Sig Dispense Refill    guaiFENesin-codeine (CHERATUSSIN AC) 100-10 MG/5ML Oral Solution Take 5 mL by mouth every 6 (six) hours as needed for cough. 118 mL 0    predniSONE 10 MG Oral Tab Take 4 tablets (40 mg total) by mouth daily for 3 days, THEN 3 tablets (30 mg total) daily for 3 days, THEN 2 tablets (20 mg total) daily for 3 days, THEN 1 tablet (10 mg total) daily for 3 days. 30 tablet 0    albuterol 108 (90 Base) MCG/ACT Inhalation Aero Soln Inhale 2 puffs into the lungs every 4 (four) hours as needed for Wheezing. 1 each 0    HYDROcodone-acetaminophen (NORCO) 5-325 MG Oral Tab Take 1 tablet by mouth every 6 (six) hours as needed for Pain. 120 tablet 0    LISINOPRIL-HYDROCHLOROTHIAZIDE 20-12.5 MG Oral Tab TAKE 1 TABLET BY MOUTH DAILY 90 tablet 0    albuterol 108 (90 Base) MCG/ACT Inhalation Aero Soln Inhale 1 puff into the lungs every 4 (four) hours as needed.      naproxen 500 MG Oral Tab Take 1 tablet (500 mg total) by mouth 2 (two) times daily as needed. 180 tablet 1    cyclobenzaprine 10 MG Oral Tab Take 1 tablet (10 mg total) by mouth 3 (three) times daily as needed for Muscle spasms. 90 tablet 1    PANTOPRAZOLE 40 MG Oral Tab EC TAKE 1 TABLET(40 MG) BY MOUTH EVERY MORNING BEFORE BREAKFAST 90 tablet 2    REPATHA SURECLICK 140 MG/ML Subcutaneous Solution Auto-injector Inject 140 mg/mL into the skin every 14 (fourteen) days.      Zolpidem Tartrate ER 6.25 MG Oral Tab CR Take 1 tablet (6.25 mg total) by mouth nightly as needed for Sleep. 30 tablet 1    Fluticasone Propionate 50 MCG/ACT Nasal Suspension 1 spray by Nasal route daily.  0    alprazolam (XANAX) 0.5 MG Oral Tab Take 1 tablet by mouth nightly as needed. 20 tablet 0    metFORMIN  MG Oral Tablet 24 Hr Take 1 tablet (750 mg total) by mouth daily. (Patient not taking: Reported on 1/10/2024) 90 tablet 1    montelukast 10 MG Oral Tab Take 1 tablet (10 mg total) by mouth daily. (Patient not taking: Reported on  1/10/2024)         Physical Exam  /74   Pulse 78   Temp 98.4 °F (36.9 °C)   Resp 16   Ht 5' 0.5\" (1.537 m)   Wt 228 lb (103.4 kg)   SpO2 95%   BMI 43.80 kg/m²   Constitutional:  No distress.   HEENT:  Normocephalic and atraumatic. Tympanic membranes normal.  Nose normal. Oropharynx is clear and moist.   Eyes: Conjunctivae are normal. PERRLA.  Neck: Normal range of motion. Neck supple.   Cardiovascular: Normal rate, regular rhythm.  No murmur, rubs or gallops.   Pulmonary/Chest: Effort normal and breath sounds normal. No respiratory distress. No wheezes, rhonchi or rales.  + coughing throughout exam  Skin: Skin is warm and dry. No rash noted. No erythema. No pallor.       A/P:    Encounter Diagnosis   Name Primary?    Acute cough - Over  2  weeks with sxs. At this point resp panel would not .  Check CXR, CBC.  Could be bronchospastic, try Prednisone taper and Albuterol prn.  Cheratussin AC q hs prn cough, pt aware she should not combine with Ambien use as both  cause sedation.  Warned pt about sedation with Cheratussin and advised about necessary precautions and activities to avoid.  F/U in 2 weeks, sooner if needed.  Pt already completed 10 days of Augmentin.   Yes       Orders Placed This Encounter   Procedures    CBC W Differential W Platelet [E]       Meds & Refills for this Visit:  Requested Prescriptions     Signed Prescriptions Disp Refills    guaiFENesin-codeine (CHERATUSSIN AC) 100-10 MG/5ML Oral Solution 118 mL 0     Sig: Take 5 mL by mouth every 6 (six) hours as needed for cough.    predniSONE 10 MG Oral Tab 30 tablet 0     Sig: Take 4 tablets (40 mg total) by mouth daily for 3 days, THEN 3 tablets (30 mg total) daily for 3 days, THEN 2 tablets (20 mg total) daily for 3 days, THEN 1 tablet (10 mg total) daily for 3 days.    albuterol 108 (90 Base) MCG/ACT Inhalation Aero Soln 1 each 0     Sig: Inhale 2 puffs into the lungs every 4 (four) hours as needed for Wheezing.        Imaging & Consults:  None    Return in about 2 weeks (around 1/24/2024) for cough.  There are no Patient Instructions on file for this visit.    All questions were answered and the patient understands the plan.

## 2024-01-24 ENCOUNTER — OFFICE VISIT (OUTPATIENT)
Dept: INTERNAL MEDICINE CLINIC | Facility: CLINIC | Age: 64
End: 2024-01-24
Payer: COMMERCIAL

## 2024-01-24 VITALS
OXYGEN SATURATION: 97 % | RESPIRATION RATE: 16 BRPM | WEIGHT: 229 LBS | HEART RATE: 106 BPM | DIASTOLIC BLOOD PRESSURE: 70 MMHG | BODY MASS INDEX: 43.8 KG/M2 | HEIGHT: 60.5 IN | SYSTOLIC BLOOD PRESSURE: 118 MMHG

## 2024-01-24 DIAGNOSIS — Z98.84 H/O BARIATRIC SURGERY: ICD-10-CM

## 2024-01-24 DIAGNOSIS — Z91.89 AT INCREASED RISK FOR CARDIOVASCULAR DISEASE: ICD-10-CM

## 2024-01-24 DIAGNOSIS — E66.01 CLASS 3 SEVERE OBESITY WITH SERIOUS COMORBIDITY AND BODY MASS INDEX (BMI) OF 45.0 TO 49.9 IN ADULT, UNSPECIFIED OBESITY TYPE (HCC): ICD-10-CM

## 2024-01-24 DIAGNOSIS — Z51.81 ENCOUNTER FOR THERAPEUTIC DRUG LEVEL MONITORING: Primary | ICD-10-CM

## 2024-01-24 DIAGNOSIS — E78.2 MIXED HYPERLIPIDEMIA: ICD-10-CM

## 2024-01-24 DIAGNOSIS — I10 PRIMARY HYPERTENSION: ICD-10-CM

## 2024-01-24 DIAGNOSIS — R73.03 PREDIABETES: ICD-10-CM

## 2024-01-24 DIAGNOSIS — E88.810 METABOLIC SYNDROME: ICD-10-CM

## 2024-01-24 PROCEDURE — 99214 OFFICE O/P EST MOD 30 MIN: CPT | Performed by: PHYSICIAN ASSISTANT

## 2024-01-24 PROCEDURE — 3074F SYST BP LT 130 MM HG: CPT | Performed by: PHYSICIAN ASSISTANT

## 2024-01-24 PROCEDURE — 3008F BODY MASS INDEX DOCD: CPT | Performed by: PHYSICIAN ASSISTANT

## 2024-01-24 PROCEDURE — 3078F DIAST BP <80 MM HG: CPT | Performed by: PHYSICIAN ASSISTANT

## 2024-01-24 RX ORDER — METFORMIN HYDROCHLORIDE 750 MG/1
750 TABLET, EXTENDED RELEASE ORAL DAILY
Qty: 90 TABLET | Refills: 1 | Status: SHIPPED | OUTPATIENT
Start: 2024-01-24

## 2024-01-24 NOTE — PROGRESS NOTES
HISTORY OF PRESENT ILLNESS  Chief Complaint   Patient presents with    Weight Check     -8lbs       Sena Reynoso is a 63 year old female here for follow up in medical weight loss program.   -8lbs  Compliant on metformin  Denies chest pain, shortness of breath, dizziness, blurred vision, headache, paresthesia, nausea/vomiting.   Trying to do more veggies and protein  Was sick and on prednisone     Exercise/Activity: walks every day with the dogs, does all the housework, goes up and down the stairs 10-15 times a day  Nutrition: 24 hour food log reviewed, eating regular meals, +protein  Stress: 8/10 - financial,  had surgery, stress has been high, not taking time for herself  Sleep: no problems      Wt Readings from Last 6 Encounters:   01/24/24 229 lb (103.9 kg)   01/10/24 228 lb (103.4 kg)   12/21/23 228 lb (103.4 kg)   11/30/23 230 lb 1.9 oz (104.4 kg)   04/21/23 237 lb (107.5 kg)   03/23/23 235 lb 12.8 oz (107 kg)            Breakfast Lunch Dinner Snacks Fluids   Reviewed           REVIEW OF SYSTEMS  GENERAL HEALTH: feels well otherwise, denied any fevers chills or night sweats   RESPIRATORY: denies shortness of breath   CARDIOVASCULAR: denies chest pain  GI: denies abdominal pain    EXAM  /70   Pulse 106   Resp 16   Ht 5' 0.5\" (1.537 m)   Wt 229 lb (103.9 kg)   SpO2 97%   BMI 43.99 kg/m²   GENERAL: well developed, well nourished,in no apparent distress, A/O x3  SKIN: no rashes,no suspicious lesions  HEENT: atraumatic, normocephalic, OP-clear, PERRL  NECK: supple,no adenopathy  LUNGS: clear to auscultation bilaterally   CARDIO: RRR without murmur  GI: good BS's,NT/ND, no masses or HSM  EXTREMITIES: no cyanosis, no clubbing, no edema    Lab Results   Component Value Date    WBC 9.8 01/10/2024    RBC 4.74 01/10/2024    HGB 13.0 01/10/2024    HCT 39.7 01/10/2024    MCV 83.8 01/10/2024    MCH 27.4 01/10/2024    MCHC 32.7 01/10/2024    RDW 14.5 01/10/2024    .0 01/10/2024    MPV 10.1 (H)  08/16/2012     Lab Results   Component Value Date    GLU 86 12/18/2023    BUN 17 12/18/2023    BUNCREA 24.3 (H) 09/17/2020    CREATSERUM 0.82 12/18/2023    ANIONGAP 4 12/18/2023     10/20/2017    GFRNAA 94 03/14/2022    GFRAA 108 03/14/2022    CA 9.5 12/18/2023    OSMOCALC 283 12/18/2023    ALKPHO 98 12/04/2023    AST 17 12/04/2023    ALT 20 12/04/2023    BILT 0.4 12/04/2023    TP 7.2 12/04/2023    ALB 3.5 12/04/2023    GLOBULIN 3.7 12/04/2023     12/18/2023    K 4.0 12/18/2023     12/18/2023    CO2 30.0 12/18/2023     Lab Results   Component Value Date     (H) 10/13/2021    A1C 6.3 (H) 10/13/2021     Lab Results   Component Value Date    CHOLEST 197 12/04/2023    TRIG 102 12/04/2023    HDL 56 12/04/2023     (H) 12/04/2023    VLDL 18 12/04/2023    TCHDLRATIO 5.16 (H) 06/13/2018    NONHDLC 141 (H) 12/04/2023     Lab Results   Component Value Date    T4F 1.2 04/20/2022    TSH 0.940 12/04/2023     Lab Results   Component Value Date    B12 1,340 (H) 04/20/2022     Lab Results   Component Value Date    VITD 32.4 04/20/2022       Current Outpatient Medications on File Prior to Visit   Medication Sig Dispense Refill    LISINOPRIL-HYDROCHLOROTHIAZIDE 20-12.5 MG Oral Tab TAKE 1 TABLET BY MOUTH DAILY 90 tablet 0    naproxen 500 MG Oral Tab Take 1 tablet (500 mg total) by mouth 2 (two) times daily as needed. 180 tablet 1    PANTOPRAZOLE 40 MG Oral Tab EC TAKE 1 TABLET(40 MG) BY MOUTH EVERY MORNING BEFORE BREAKFAST 90 tablet 2    REPATHA SURECLICK 140 MG/ML Subcutaneous Solution Auto-injector Inject 140 mg/mL into the skin every 14 (fourteen) days.      Zolpidem Tartrate ER 6.25 MG Oral Tab CR Take 1 tablet (6.25 mg total) by mouth nightly as needed for Sleep. 30 tablet 1    albuterol 108 (90 Base) MCG/ACT Inhalation Aero Soln Inhale 2 puffs into the lungs every 4 (four) hours as needed for Wheezing. (Patient not taking: Reported on 1/24/2024) 1 each 0    HYDROcodone-acetaminophen (NORCO) 5-325  MG Oral Tab Take 1 tablet by mouth every 6 (six) hours as needed for Pain. (Patient not taking: Reported on 1/24/2024) 120 tablet 0    albuterol 108 (90 Base) MCG/ACT Inhalation Aero Soln Inhale 1 puff into the lungs every 4 (four) hours as needed. (Patient not taking: Reported on 1/24/2024)      cyclobenzaprine 10 MG Oral Tab Take 1 tablet (10 mg total) by mouth 3 (three) times daily as needed for Muscle spasms. (Patient not taking: Reported on 1/24/2024) 90 tablet 1    montelukast 10 MG Oral Tab Take 1 tablet (10 mg total) by mouth daily. (Patient not taking: Reported on 1/10/2024)      Fluticasone Propionate 50 MCG/ACT Nasal Suspension 1 spray by Nasal route daily. (Patient not taking: Reported on 1/24/2024)  0    alprazolam (XANAX) 0.5 MG Oral Tab Take 1 tablet by mouth nightly as needed. (Patient not taking: Reported on 1/24/2024) 20 tablet 0     No current facility-administered medications on file prior to visit.       ASSESSMENT  Analyzed weight data:       Diagnoses and all orders for this visit:    Encounter for therapeutic drug level monitoring    Class 3 severe obesity with serious comorbidity and body mass index (BMI) of 45.0 to 49.9 in adult, unspecified obesity type (HCC)    H/O bariatric surgery    Prediabetes    Primary hypertension    Mixed hyperlipidemia    Metabolic syndrome    At increased risk for cardiovascular disease    Other orders  -     metFORMIN  MG Oral Tablet 24 Hr; Take 1 tablet (750 mg total) by mouth daily.  -     semaglutide 4 MG/3ML Subcutaneous Solution Pen-injector; Inject 1 mg into the skin once a week.  -     semaglutide (OZEMPIC, 0.25 OR 0.5 MG/DOSE,) 2 MG/3ML Subcutaneous Solution Pen-injector; Inject 0.25 mg into the skin once a week.        PLAN  Initial Weight: 246 lbs  Initial Weight Date: 07/08/22  Today's Weight: 229 lbs  5% Goal: 12.3  10% Goal: 24.6  Total Weight Loss: Down 8lbs/Net loss 17lbs    Will continue metformin - advised side effects and adverse effects  of medication   Will begin Ozempic 0.25mg weekly - denies any personal or family history of pancreatitis, pancreatic cancer, thyroid cancer, MEN2 - discussed MOA, advised side effects and adverse effects of medication.  HTN - blood pressure well controlled on current medication - advised signs and symptoms of hypotension and will monitor with continued weight loss  HLD - stable on current medication, will continue, will continue to work on lifestyle modifications  Prediabetes - continue current medications, low carb diet  Consistency with logging foods - protein and produce  Stress management, journal, mindfulness, meditation, yoga, puzzles, coloring, etc.  Nutrition: low carb diet/ recommended to eat breakfast daily/ regular protein intake  Medication use and side effects reviewed with patient.  Medication contraindications: stimulant  Follow up with dietitian and psychologist as recommended.  Discussed the role of sleep and stress in weight management.  Counseled on comprehensive weight loss plan including attention to nutrition, exercise and behavior/stress management for success. See patient instruction below for more details.  Discussed strategies to overcome barriers to successful weight loss and weight maintenance  FITTE: ACSM recommendations (150-300 minutes/ week in active weight loss)   Weight Loss consent to treat reviewed and signed       NOTE TO PATIENT: The 21st Century Cures Act makes clinical notes like these available to patients in the interest of transparency. Clinical notes are medical documents used by physicians and care providers to communicate with each other. These documents include medical language and terminology, abbreviations, and treatment information that may sound technical and at times possibly unfamiliar. In addition, at times, the verbiage may appear blunt or direct. These documents are one tool providers use to communicate relevant information and clinical opinions of the care  providers in a way that allows common understanding of the clinical context.     There are no Patient Instructions on file for this visit.    No follow-ups on file.    Patient verbalizes understanding.    Ina Anderson PA-C  1/24/2024

## 2024-01-30 RX ORDER — SEMAGLUTIDE 0.68 MG/ML
0.25 INJECTION, SOLUTION SUBCUTANEOUS WEEKLY
Qty: 1 EACH | Refills: 12 | COMMUNITY
Start: 2024-01-30

## 2024-02-01 ENCOUNTER — OFFICE VISIT (OUTPATIENT)
Dept: INTERNAL MEDICINE CLINIC | Facility: CLINIC | Age: 64
End: 2024-02-01
Payer: COMMERCIAL

## 2024-02-01 VITALS
RESPIRATION RATE: 16 BRPM | HEIGHT: 60.5 IN | SYSTOLIC BLOOD PRESSURE: 126 MMHG | OXYGEN SATURATION: 98 % | HEART RATE: 84 BPM | WEIGHT: 228.13 LBS | DIASTOLIC BLOOD PRESSURE: 72 MMHG | BODY MASS INDEX: 43.63 KG/M2

## 2024-02-01 DIAGNOSIS — R05.1 ACUTE COUGH: ICD-10-CM

## 2024-02-01 DIAGNOSIS — B02.9 HERPES ZOSTER WITHOUT COMPLICATION: Primary | ICD-10-CM

## 2024-02-01 PROCEDURE — 3074F SYST BP LT 130 MM HG: CPT | Performed by: PHYSICIAN ASSISTANT

## 2024-02-01 PROCEDURE — 99214 OFFICE O/P EST MOD 30 MIN: CPT | Performed by: PHYSICIAN ASSISTANT

## 2024-02-01 PROCEDURE — 3008F BODY MASS INDEX DOCD: CPT | Performed by: PHYSICIAN ASSISTANT

## 2024-02-01 PROCEDURE — 3078F DIAST BP <80 MM HG: CPT | Performed by: PHYSICIAN ASSISTANT

## 2024-02-01 RX ORDER — VALACYCLOVIR HYDROCHLORIDE 1 G/1
1000 TABLET, FILM COATED ORAL 2 TIMES DAILY
COMMUNITY
Start: 2024-01-30

## 2024-02-01 RX ORDER — AZITHROMYCIN 250 MG/1
TABLET, FILM COATED ORAL
COMMUNITY
Start: 2023-12-21

## 2024-02-01 RX ORDER — GABAPENTIN 100 MG/1
200 CAPSULE ORAL 3 TIMES DAILY
Qty: 270 CAPSULE | Refills: 0 | Status: SHIPPED | OUTPATIENT
Start: 2024-02-01

## 2024-02-01 RX ORDER — LIDOCAINE 50 MG/G
1 PATCH TOPICAL EVERY 24 HOURS
Qty: 30 EACH | Refills: 0 | Status: SHIPPED | OUTPATIENT
Start: 2024-02-01

## 2024-02-01 RX ORDER — GABAPENTIN 100 MG/1
100 CAPSULE ORAL 3 TIMES DAILY
COMMUNITY
Start: 2024-01-30 | End: 2024-02-01

## 2024-02-01 NOTE — PROGRESS NOTES
Chief Complaint   Patient presents with    Follow - Up     Rm 2 kb       HPI:  Pt presents for follow up of cough with c/o shingles.  Pt started with pain in the back and around to her belly button last week and then developed a rash in the area over the weekend.  Pt could not get into our office and states she had difficulty even getting through on the phone so saw her old physician in [place] on Tuesday.  She was diagnosed with Shingles and placed on Valtrex and Gabapentin (tolerating and compliant thus far).  Pt reports this is not controlling her pain, she feels she is \"having surgery without anesthesia.\"  Taking Gabapentin 100 mg TID currently.  Before the rash appeared she was using \"Icy Hot\" patches with some relief.  Pt looking for pain relief.  Her  who accompanies her today states that she has a h/o mood disturbance on Gabapentin but pt currently denies those SEs.      Cough has resolved since Prednisone taper, sinuses improved.      Review of Systems   See HPI.  No other complaints today.    Past Medical History:   Diagnosis Date    Arthritis     Diverticulosis of large intestine     Esophageal reflux     Fibroid 2003    Fibromyalgia     Glaucoma     High blood pressure     HIGH CHOLESTEROL     HYPERTENSION     Osteoarthritis     Ovarian cyst 2003       Patient Active Problem List   Diagnosis    Pernicious anemia    HTN (hypertension)    Morbid obesity with BMI of 40.0-44.9, adult (HCC)    Fibromyalgia    Primary osteoarthritis of left knee    Rotator cuff tear arthropathy of both shoulders    Primary osteoarthritis of right knee/R TKR sx 2-13-19/Global Exp 5-13-19/TRK/EP    Status post total right knee replacement    Class 3 severe obesity due to excess calories without serious comorbidity with body mass index (BMI) of 40.0 to 44.9 in adult (HCC)    Chronic pain of both knees    Pre-diabetes    History of total abdominal hysterectomy    Osteoarthritis of cervical spine with myelopathy     Primary osteoarthritis of both hands    Statin intolerance    High cholesterol       Current Outpatient Medications   Medication Sig Dispense Refill    valACYclovir 1 G Oral Tab Take 1 tablet (1,000 mg total) by mouth 2 (two) times daily.      azithromycin 250 MG Oral Tab       lidocaine 5 % External Patch Place 1 patch onto the skin daily. 30 each 0    gabapentin 100 MG Oral Cap Take 2 capsules (200 mg total) by mouth 3 (three) times daily. 270 capsule 0    semaglutide (OZEMPIC, 0.25 OR 0.5 MG/DOSE,) 2 MG/3ML Subcutaneous Solution Pen-injector Inject 0.25 mg into the skin once a week. 1 each 12    metFORMIN  MG Oral Tablet 24 Hr Take 1 tablet (750 mg total) by mouth daily. 90 tablet 1    semaglutide 4 MG/3ML Subcutaneous Solution Pen-injector Inject 1 mg into the skin once a week. 9 mL 0    albuterol 108 (90 Base) MCG/ACT Inhalation Aero Soln Inhale 2 puffs into the lungs every 4 (four) hours as needed for Wheezing. 1 each 0    HYDROcodone-acetaminophen (NORCO) 5-325 MG Oral Tab Take 1 tablet by mouth every 6 (six) hours as needed for Pain. 120 tablet 0    LISINOPRIL-HYDROCHLOROTHIAZIDE 20-12.5 MG Oral Tab TAKE 1 TABLET BY MOUTH DAILY 90 tablet 0    albuterol 108 (90 Base) MCG/ACT Inhalation Aero Soln Inhale 1 puff into the lungs every 4 (four) hours as needed.      naproxen 500 MG Oral Tab Take 1 tablet (500 mg total) by mouth 2 (two) times daily as needed. 180 tablet 1    cyclobenzaprine 10 MG Oral Tab Take 1 tablet (10 mg total) by mouth 3 (three) times daily as needed for Muscle spasms. 90 tablet 1    PANTOPRAZOLE 40 MG Oral Tab EC TAKE 1 TABLET(40 MG) BY MOUTH EVERY MORNING BEFORE BREAKFAST 90 tablet 2    REPATHA SURECLICK 140 MG/ML Subcutaneous Solution Auto-injector Inject 140 mg/mL into the skin every 14 (fourteen) days.      montelukast 10 MG Oral Tab Take 1 tablet (10 mg total) by mouth daily.      Zolpidem Tartrate ER 6.25 MG Oral Tab CR Take 1 tablet (6.25 mg total) by mouth nightly as needed for  Sleep. 30 tablet 1    Fluticasone Propionate 50 MCG/ACT Nasal Suspension 1 spray by Nasal route daily.  0    alprazolam (XANAX) 0.5 MG Oral Tab Take 1 tablet by mouth nightly as needed. 20 tablet 0       Physical Exam  /72   Pulse 84   Resp 16   Ht 5' 0.5\" (1.537 m)   Wt 228 lb 1.6 oz (103.5 kg)   SpO2 98%   BMI 43.81 kg/m²   Constitutional:  No distress.   HEENT:  Normocephalic.  Skin: Skin is warm and dry. + erythematous papules clutered and patchy in a dermatomal distribution over L lower back and abd, T10.  No open lesions or vessicles noted.  A couple of eschar.      A/P:    Encounter Diagnoses   Name Primary?    Herpes zoster without complication - no significant complications but pain is not well controlled.  Discussed options with pt and .  Will increase Gabapentin to 200 mg TID, may increase to 300 mg TID if tolerating without SEs after 4-5 days.  Can also try Salonpas patches but instructed to avoid any open lesions.  Pt may also try Icy hot patches in areas where there is no rash for pain relief.  Pt to notify me if questions or problems.  F/U in 2 weeks, sooner if needed. Yes    Acute cough - resolved.        Code selection for this visit was based on time spent (32 min) on date of service in preparing to see the patient, obtaining and/or reviewing separately obtained history, performing a medically appropriate examination, counseling and educating the patient/family/caregiver, ordering medications or testing, referring and communicating with other healthcare providers, documenting clinical information in the EHR, independently interpreting results and communicating results to the patient/family/caregiver and care coordination with the patient's other providers.      No orders of the defined types were placed in this encounter.      Meds & Refills for this Visit:  Requested Prescriptions     Signed Prescriptions Disp Refills    lidocaine 5 % External Patch 30 each 0     Sig: Place 1  patch onto the skin daily.    gabapentin 100 MG Oral Cap 270 capsule 0     Sig: Take 2 capsules (200 mg total) by mouth 3 (three) times daily.       Imaging & Consults:  None    Return in about 2 weeks (around 2/15/2024), or if symptoms worsen or fail to improve, for shingles.  There are no Patient Instructions on file for this visit.    All questions were answered and the patient understands the plan.

## 2024-05-09 ENCOUNTER — OFFICE VISIT (OUTPATIENT)
Facility: CLINIC | Age: 64
End: 2024-05-09
Payer: COMMERCIAL

## 2024-05-09 VITALS
HEART RATE: 90 BPM | OXYGEN SATURATION: 97 % | HEIGHT: 60.5 IN | WEIGHT: 225 LBS | DIASTOLIC BLOOD PRESSURE: 70 MMHG | SYSTOLIC BLOOD PRESSURE: 124 MMHG | RESPIRATION RATE: 18 BRPM | BODY MASS INDEX: 43.03 KG/M2

## 2024-05-09 DIAGNOSIS — E88.810 METABOLIC SYNDROME: ICD-10-CM

## 2024-05-09 DIAGNOSIS — Z91.89 AT INCREASED RISK FOR CARDIOVASCULAR DISEASE: ICD-10-CM

## 2024-05-09 DIAGNOSIS — E66.01 CLASS 3 SEVERE OBESITY WITH SERIOUS COMORBIDITY AND BODY MASS INDEX (BMI) OF 40.0 TO 44.9 IN ADULT, UNSPECIFIED OBESITY TYPE (HCC): ICD-10-CM

## 2024-05-09 DIAGNOSIS — E78.2 MIXED HYPERLIPIDEMIA: ICD-10-CM

## 2024-05-09 DIAGNOSIS — Z98.84 H/O BARIATRIC SURGERY: ICD-10-CM

## 2024-05-09 DIAGNOSIS — R73.03 PREDIABETES: ICD-10-CM

## 2024-05-09 DIAGNOSIS — I10 PRIMARY HYPERTENSION: ICD-10-CM

## 2024-05-09 DIAGNOSIS — Z51.81 ENCOUNTER FOR THERAPEUTIC DRUG LEVEL MONITORING: Primary | ICD-10-CM

## 2024-05-09 PROCEDURE — 3074F SYST BP LT 130 MM HG: CPT | Performed by: PHYSICIAN ASSISTANT

## 2024-05-09 PROCEDURE — 3008F BODY MASS INDEX DOCD: CPT | Performed by: PHYSICIAN ASSISTANT

## 2024-05-09 PROCEDURE — 3078F DIAST BP <80 MM HG: CPT | Performed by: PHYSICIAN ASSISTANT

## 2024-05-09 PROCEDURE — 99214 OFFICE O/P EST MOD 30 MIN: CPT | Performed by: PHYSICIAN ASSISTANT

## 2024-05-09 RX ORDER — METFORMIN HYDROCHLORIDE 750 MG/1
1500 TABLET, EXTENDED RELEASE ORAL DAILY
Qty: 180 TABLET | Refills: 0 | Status: SHIPPED | OUTPATIENT
Start: 2024-05-09

## 2024-05-09 NOTE — PROGRESS NOTES
HISTORY OF PRESENT ILLNESS  Chief Complaint   Patient presents with    Weight Check     -4lbs       Sena Reynoso is a 63 year old female here for follow up in medical weight loss program.   -4lbs  Compliant on metformin  Felt really good on the ozempic, however insurance would not cover it  Denies chest pain, shortness of breath, dizziness, blurred vision, headache, paresthesia, nausea/vomiting.   Got shingles in January after a long virus, daughter broke both feet  Stress has been much higher, knows food choices haven't been as great with all the stress  Exercise/Activity: walking, exercises from physical therapy  Nutrition: 24 hour food log reviewed, eating regular meals, +protein  Stress: 8/10 - much more stressful  Sleep: 5 hours/night       Wt Readings from Last 6 Encounters:   05/09/24 225 lb (102.1 kg)   02/01/24 228 lb 1.6 oz (103.5 kg)   01/24/24 229 lb (103.9 kg)   01/10/24 228 lb (103.4 kg)   12/21/23 228 lb (103.4 kg)   11/30/23 230 lb 1.9 oz (104.4 kg)            Breakfast Lunch Dinner Snacks Fluids   Reviewed           REVIEW OF SYSTEMS  GENERAL HEALTH: feels well otherwise, denied any fevers chills or night sweats   RESPIRATORY: denies shortness of breath   CARDIOVASCULAR: denies chest pain  GI: denies abdominal pain    EXAM  /70   Pulse 90   Resp 18   Ht 5' 0.5\" (1.537 m)   Wt 225 lb (102.1 kg)   SpO2 97%   BMI 43.22 kg/m²   GENERAL: well developed, well nourished,in no apparent distress, A/O x3  SKIN: no rashes,no suspicious lesions  HEENT: atraumatic, normocephalic, OP-clear, PERRL  NECK: supple,no adenopathy  LUNGS: clear to auscultation bilaterally   CARDIO: RRR without murmur  GI: good BS's,NT/ND, no masses or HSM  EXTREMITIES: no cyanosis, no clubbing, no edema    Lab Results   Component Value Date    WBC 9.8 01/10/2024    RBC 4.74 01/10/2024    HGB 13.0 01/10/2024    HCT 39.7 01/10/2024    MCV 83.8 01/10/2024    MCH 27.4 01/10/2024    MCHC 32.7 01/10/2024    RDW 14.5 01/10/2024     .0 01/10/2024    MPV 10.1 (H) 08/16/2012     Lab Results   Component Value Date    GLU 86 12/18/2023    BUN 17 12/18/2023    BUNCREA 24.3 (H) 09/17/2020    CREATSERUM 0.82 12/18/2023    ANIONGAP 4 12/18/2023     10/20/2017    GFRNAA 94 03/14/2022    GFRAA 108 03/14/2022    CA 9.5 12/18/2023    OSMOCALC 283 12/18/2023    ALKPHO 98 12/04/2023    AST 17 12/04/2023    ALT 20 12/04/2023    BILT 0.4 12/04/2023    TP 7.2 12/04/2023    ALB 3.5 12/04/2023    GLOBULIN 3.7 12/04/2023     12/18/2023    K 4.0 12/18/2023     12/18/2023    CO2 30.0 12/18/2023     Lab Results   Component Value Date     (H) 10/13/2021    A1C 6.3 (H) 10/13/2021     Lab Results   Component Value Date    CHOLEST 197 12/04/2023    TRIG 102 12/04/2023    HDL 56 12/04/2023     (H) 12/04/2023    VLDL 18 12/04/2023    TCHDLRATIO 5.16 (H) 06/13/2018    NONHDLC 141 (H) 12/04/2023     Lab Results   Component Value Date    T4F 1.2 04/20/2022    TSH 0.940 12/04/2023     Lab Results   Component Value Date    B12 1,340 (H) 04/20/2022     Lab Results   Component Value Date    VITD 32.4 04/20/2022       Current Outpatient Medications on File Prior to Visit   Medication Sig Dispense Refill    lidocaine 5 % External Patch Place 1 patch onto the skin daily. 30 each 0    metFORMIN  MG Oral Tablet 24 Hr Take 1 tablet (750 mg total) by mouth daily. 90 tablet 1    albuterol 108 (90 Base) MCG/ACT Inhalation Aero Soln Inhale 2 puffs into the lungs every 4 (four) hours as needed for Wheezing. 1 each 0    HYDROcodone-acetaminophen (NORCO) 5-325 MG Oral Tab Take 1 tablet by mouth every 6 (six) hours as needed for Pain. 120 tablet 0    LISINOPRIL-HYDROCHLOROTHIAZIDE 20-12.5 MG Oral Tab TAKE 1 TABLET BY MOUTH DAILY 90 tablet 0    naproxen 500 MG Oral Tab Take 1 tablet (500 mg total) by mouth 2 (two) times daily as needed. 180 tablet 1    cyclobenzaprine 10 MG Oral Tab Take 1 tablet (10 mg total) by mouth 3 (three) times daily as  needed for Muscle spasms. 90 tablet 1    PANTOPRAZOLE 40 MG Oral Tab EC TAKE 1 TABLET(40 MG) BY MOUTH EVERY MORNING BEFORE BREAKFAST 90 tablet 2    REPATHA SURECLICK 140 MG/ML Subcutaneous Solution Auto-injector Inject 140 mg/mL into the skin every 14 (fourteen) days.      Zolpidem Tartrate ER 6.25 MG Oral Tab CR Take 1 tablet (6.25 mg total) by mouth nightly as needed for Sleep. 30 tablet 1    Fluticasone Propionate 50 MCG/ACT Nasal Suspension 1 spray by Nasal route daily.  0    alprazolam (XANAX) 0.5 MG Oral Tab Take 1 tablet by mouth nightly as needed. 20 tablet 0    gabapentin 100 MG Oral Cap Take 2 capsules (200 mg total) by mouth 3 (three) times daily. (Patient not taking: Reported on 5/9/2024) 270 capsule 0    semaglutide (OZEMPIC, 0.25 OR 0.5 MG/DOSE,) 2 MG/3ML Subcutaneous Solution Pen-injector Inject 0.25 mg into the skin once a week. (Patient not taking: Reported on 5/9/2024) 1 each 12    [DISCONTINUED] semaglutide 4 MG/3ML Subcutaneous Solution Pen-injector Inject 1 mg into the skin once a week. (Patient not taking: Reported on 5/9/2024) 9 mL 0    montelukast 10 MG Oral Tab Take 1 tablet (10 mg total) by mouth daily. (Patient not taking: Reported on 5/9/2024)       No current facility-administered medications on file prior to visit.       ASSESSMENT  Analyzed weight data:       There are no diagnoses linked to this encounter.    PLAN  Initial Weight: 246 lbs  Initial Weight Date: 07/08/22  Today's Weight: 225 lbs  5% Goal: 12.3  10% Goal: 24.6  Total Weight Loss: Down 4lbs/Net loss 21lbs    Will continue and increase metformin - advised side effects and adverse effects of medication   Prediabetes - continue current medications, low carb diet  HTN - blood pressure well controlled on current medication - advised signs and symptoms of hypotension and will monitor with continued weight loss  HLD - stable on current medication, will continue, will continue to work on lifestyle modifications  Consistency with  logging foods - protein and produce  Stress has been very high, discussed stress management, meditation, mindfulness, reading, coloring, puzzles, etc.  Continue to work on incorporating strength/resistance training - sit and be fit, chair yoga  Nutrition: low carb diet/ recommended to eat breakfast daily/ regular protein intake  Medication use and side effects reviewed with patient.  Medication contraindications: stimulant  Follow up with dietitian and psychologist as recommended.  Discussed the role of sleep and stress in weight management.  Counseled on comprehensive weight loss plan including attention to nutrition, exercise and behavior/stress management for success. See patient instruction below for more details.  Discussed strategies to overcome barriers to successful weight loss and weight maintenance  FITTE: ACSM recommendations (150-300 minutes/ week in active weight loss)   Weight Loss consent to treat reviewed and signed       NOTE TO PATIENT: The 21st Century Cures Act makes clinical notes like these available to patients in the interest of transparency. Clinical notes are medical documents used by physicians and care providers to communicate with each other. These documents include medical language and terminology, abbreviations, and treatment information that may sound technical and at times possibly unfamiliar. In addition, at times, the verbiage may appear blunt or direct. These documents are one tool providers use to communicate relevant information and clinical opinions of the care providers in a way that allows common understanding of the clinical context.     There are no Patient Instructions on file for this visit.    No follow-ups on file.    Patient verbalizes understanding.    Ina Anderson PA-C  5/9/2024

## 2024-05-29 ENCOUNTER — TELEPHONE (OUTPATIENT)
Dept: RHEUMATOLOGY | Facility: CLINIC | Age: 64
End: 2024-05-29

## 2024-05-29 ENCOUNTER — OFFICE VISIT (OUTPATIENT)
Dept: RHEUMATOLOGY | Facility: CLINIC | Age: 64
End: 2024-05-29

## 2024-05-29 VITALS
WEIGHT: 225 LBS | TEMPERATURE: 98 F | HEIGHT: 60.5 IN | OXYGEN SATURATION: 97 % | HEART RATE: 85 BPM | RESPIRATION RATE: 16 BRPM | BODY MASS INDEX: 43.03 KG/M2 | DIASTOLIC BLOOD PRESSURE: 82 MMHG | SYSTOLIC BLOOD PRESSURE: 126 MMHG

## 2024-05-29 DIAGNOSIS — M47.816 DEGENERATIVE JOINT DISEASE OF CERVICAL AND LUMBAR SPINE: ICD-10-CM

## 2024-05-29 DIAGNOSIS — M12.812 ROTATOR CUFF TEAR ARTHROPATHY OF BOTH SHOULDERS: ICD-10-CM

## 2024-05-29 DIAGNOSIS — M75.102 ROTATOR CUFF TEAR ARTHROPATHY OF BOTH SHOULDERS: ICD-10-CM

## 2024-05-29 DIAGNOSIS — M19.041 PRIMARY OSTEOARTHRITIS OF BOTH HANDS: ICD-10-CM

## 2024-05-29 DIAGNOSIS — M47.12 OSTEOARTHRITIS OF CERVICAL SPINE WITH MYELOPATHY: ICD-10-CM

## 2024-05-29 DIAGNOSIS — M79.7 FIBROMYALGIA: Primary | ICD-10-CM

## 2024-05-29 DIAGNOSIS — M17.11 PRIMARY OSTEOARTHRITIS OF RIGHT KNEE: ICD-10-CM

## 2024-05-29 DIAGNOSIS — Z96.651 STATUS POST TOTAL RIGHT KNEE REPLACEMENT: ICD-10-CM

## 2024-05-29 DIAGNOSIS — M19.042 PRIMARY OSTEOARTHRITIS OF BOTH HANDS: ICD-10-CM

## 2024-05-29 DIAGNOSIS — E66.01 CLASS 3 SEVERE OBESITY DUE TO EXCESS CALORIES WITH SERIOUS COMORBIDITY AND BODY MASS INDEX (BMI) OF 40.0 TO 44.9 IN ADULT (HCC): ICD-10-CM

## 2024-05-29 DIAGNOSIS — M12.811 ROTATOR CUFF TEAR ARTHROPATHY OF BOTH SHOULDERS: ICD-10-CM

## 2024-05-29 DIAGNOSIS — M47.812 DEGENERATIVE JOINT DISEASE OF CERVICAL AND LUMBAR SPINE: ICD-10-CM

## 2024-05-29 DIAGNOSIS — M17.12 PRIMARY OSTEOARTHRITIS OF LEFT KNEE: ICD-10-CM

## 2024-05-29 DIAGNOSIS — M75.101 ROTATOR CUFF TEAR ARTHROPATHY OF BOTH SHOULDERS: ICD-10-CM

## 2024-05-29 PROCEDURE — 3079F DIAST BP 80-89 MM HG: CPT | Performed by: INTERNAL MEDICINE

## 2024-05-29 PROCEDURE — 3008F BODY MASS INDEX DOCD: CPT | Performed by: INTERNAL MEDICINE

## 2024-05-29 PROCEDURE — 99214 OFFICE O/P EST MOD 30 MIN: CPT | Performed by: INTERNAL MEDICINE

## 2024-05-29 PROCEDURE — 3074F SYST BP LT 130 MM HG: CPT | Performed by: INTERNAL MEDICINE

## 2024-05-29 RX ORDER — HYDROCODONE BITARTRATE AND ACETAMINOPHEN 5; 325 MG/1; MG/1
1 TABLET ORAL EVERY 6 HOURS PRN
Qty: 120 TABLET | Refills: 0 | Status: SHIPPED | OUTPATIENT
Start: 2024-05-29

## 2024-05-29 RX ORDER — ZOLPIDEM TARTRATE 10 MG/1
10 TABLET ORAL NIGHTLY
Qty: 30 TABLET | Refills: 5 | Status: SHIPPED | OUTPATIENT
Start: 2024-05-29 | End: 2025-05-24

## 2024-05-29 RX ORDER — CYCLOBENZAPRINE HCL 10 MG
10 TABLET ORAL 3 TIMES DAILY
Qty: 90 TABLET | Refills: 1 | Status: SHIPPED | OUTPATIENT
Start: 2024-05-29 | End: 2024-07-28

## 2024-05-29 RX ORDER — NAPROXEN 500 MG/1
500 TABLET ORAL 2 TIMES DAILY PRN
Qty: 180 TABLET | Refills: 1 | Status: SHIPPED | OUTPATIENT
Start: 2024-05-29

## 2024-05-29 RX ORDER — HYDROCODONE BITARTRATE AND ACETAMINOPHEN 5; 325 MG/1; MG/1
1 TABLET ORAL EVERY 6 HOURS PRN
Qty: 120 TABLET | Refills: 0 | Status: SHIPPED | OUTPATIENT
Start: 2024-06-29

## 2024-05-29 NOTE — PATIENT INSTRUCTIONS
Naproxen 500 mg twice a day for osteoarthritis pain.  Norco use as needed generally 1 or 2 a day for severe pain.  5 mg dose.  For muscle pain use cyclobenzaprine 10 mg once or twice a day.  Zolpidem 10 mg as needed for sleep at night  Try to walk for exercise best you can.  Try to lose weight.    Form filled out regarding disability due to prior bilateral knee arthritis status post right knee arthritis unable to stand longer than 20 minutes, unable to sit longer than an hour due to need to stretch.  Walking with a cane.  Return to office 6 months.

## 2024-05-29 NOTE — PROGRESS NOTES
[facility]  Dr. Buckner Progress Note     Subjective:   Sena Reynoso is a(n) 64 year old female.   Current complaints:   Chief Complaint   Patient presents with    Fibromyalgia Syndrome    Osteoarthritis    Follow - Up     Had shingles in Janurary and now has frequent fibro flare ups. Has constant pain. Especially in bottoms of feet. Pain today is 7/10.    History of cervical spondylosis, fibromyalgia, right knee replacement in 2019, right shoulder rotator cuff problems.  Side effects in the past from gabapentin-makes her drowsy.  .  Uses Norco for pain.  Uses cyclobenzaprine as a muscle relaxant.  Had shingles in January on her left side of back - it was painful.  It caused flare up.  Now feels like walking on broken glass.  Daughter fell and broke both feet.  She is now in a wheelchair.    Objective:   /82 (BP Location: Right arm, Patient Position: Sitting, Cuff Size: adult)   Pulse 85   Temp 97.5 °F (36.4 °C)   Resp 16   Ht 5' 0.5\" (1.537 m)   Wt 225 lb (102.1 kg)   SpO2 97%   BMI 43.22 kg/m²   On exam lungs are clear.  Heart normal sinus rhythm.  Right knee is tender artificial.  Left knee has hypertrophic changes and is tender.  Lower leg edema.  Neck posteriorly trace tender.  Degenerative cervical spine.  Degenerative cervical spine  1/10/2024 white count 9.8 hemoglobin 13.0 hematocrit 39.7 platelet count 394,000 MCV 83  1/10/2024 chest x-ray normal.  12/18/2023 BUN 17 creatinine 0.82 potassium 4.0  8/4/2022 x-ray cervical spine shows mild to moderate multilevel degenerative changes.  8/4/2022 x-ray lumbar spine shows degenerative changes-multilevel disc space narrowing with marginal osteophytes.  Assessment:     Encounter Diagnoses   Name Primary?    Fibromyalgia Yes    Status post total right knee replacement     Primary osteoarthritis of both hands     Osteoarthritis of cervical spine with myelopathy     Primary osteoarthritis of left knee     Primary osteoarthritis of right knee/R TKR  sx 2-13-19/Global Exp 5-13-19/TRK/EP     Rotator cuff tear arthropathy of both shoulders     Degenerative joint disease of cervical and lumbar spine     Class 3 severe obesity due to excess calories with serious comorbidity and body mass index (BMI) of 40.0 to 44.9 in adult (HCC)      Plan:     Patient Instructions   Naproxen 500 mg twice a day for osteoarthritis pain.  Norco use as needed generally 1 or 2 a day for severe pain.  5 mg dose.  For muscle pain use cyclobenzaprine 10 mg once or twice a day.  Zolpidem 10 mg as needed for sleep at night  Try to walk for exercise best you can.  Try to lose weight.    Form filled out regarding disability due to prior bilateral knee arthritis status post right knee arthritis unable to stand longer than 20 minutes, unable to sit longer than an hour due to need to stretch.  Walking with a cane.  Return to office 6 months.          Everett Buckner MD 5/29/2024 12:10 PM

## 2024-07-10 ENCOUNTER — TELEPHONE (OUTPATIENT)
Facility: CLINIC | Age: 64
End: 2024-07-10

## 2024-07-10 NOTE — TELEPHONE ENCOUNTER
Pt would like to know if you ever filled her ozempic or refilled it. If not, can you send it to her pharmacy or let her know if its covered or not. She has an appt in October with you.

## 2024-07-14 RX ORDER — SEMAGLUTIDE 0.68 MG/ML
0.25 INJECTION, SOLUTION SUBCUTANEOUS WEEKLY
Qty: 3 ML | Refills: 0 | Status: SHIPPED | OUTPATIENT
Start: 2024-07-14

## 2024-07-18 ENCOUNTER — TELEPHONE (OUTPATIENT)
Dept: INTERNAL MEDICINE CLINIC | Facility: CLINIC | Age: 64
End: 2024-07-18

## 2024-07-18 NOTE — TELEPHONE ENCOUNTER
Patient called to ask the clinical staff if we have received a Prior Auth form from the patients insurance plan [facility] as they informed her that they sent a fax out today to the weight loss provider     Patient is asking for a status update on her prior auth being faxed over to weight loss     Future Appointments   Date Time Provider Department Center   10/28/2024 12:20 PM Ina Anderson PA-C [facility] [address]   11/25/2024 10:00 AM Everett Buckner MD [facility] [place]     Patients callback # provided: [phone]

## 2024-07-29 NOTE — TELEPHONE ENCOUNTER
Spoke with patient via phone.  Patient did not read past MCM from our office.  Shared with patient Ozempic is only FDA approved for diabetics and our office will not do PA.  Patient verbalized understanding- patient asked about compound semaglutide as her daughter will be starting soon.  Answered patient's questions.  Patient is interested but wants to see how her daughter does with medication and how to do the injection.  Patient will contact our office if she is interested in pursuing further.      Ina- patient is asking if there are other medication alternatives other than compound semaglutide; Ozempic now requiring PA

## 2024-08-01 NOTE — TELEPHONE ENCOUNTER
We could try a medication called topamax - used for migraines, works on receptors in the brain and curbs cravings for carbs and sweets    Or we can do compound semaglutide

## 2024-08-06 NOTE — TELEPHONE ENCOUNTER
Requesting   Requested Prescriptions     Pending Prescriptions Disp Refills    METFORMIN  MG Oral Tablet 24 Hr [Pharmacy Med Name: METFORMIN ER 750MG 24HR TABS] 180 tablet 0     Sig: TAKE 2 TABLETS(1500 MG) BY MOUTH DAILY      LOV: 05/09/24  RTC: 3mo  Last Relevant Labs:   Filled: 05/09/24 #180 with 0 refills    Future Appointments   Date Time Provider Department Center   10/28/2024 12:20 PM Ina Anderson PA-C [facility] [address]   11/25/2024 10:00 AM Everett Buckner MD [facility] [place]

## 2024-08-08 RX ORDER — METFORMIN HYDROCHLORIDE 750 MG/1
1500 TABLET, EXTENDED RELEASE ORAL DAILY
Qty: 180 TABLET | Refills: 0 | Status: SHIPPED | OUTPATIENT
Start: 2024-08-08

## 2024-09-06 DIAGNOSIS — I10 PRIMARY HYPERTENSION: ICD-10-CM

## 2024-09-08 RX ORDER — LISINOPRIL AND HYDROCHLOROTHIAZIDE 12.5; 2 MG/1; MG/1
1 TABLET ORAL DAILY
Qty: 90 TABLET | Refills: 3 | Status: SHIPPED | OUTPATIENT
Start: 2024-09-08

## 2024-09-08 NOTE — TELEPHONE ENCOUNTER
Refill passed per [facility] protocol.    Requested Prescriptions   Pending Prescriptions Disp Refills    LISINOPRIL-HYDROCHLOROTHIAZIDE 20-12.5 MG Oral Tab [Pharmacy Med Name: LISINOPRIL-HCTZ 20/12.5MG TABLETS] 90 tablet 0     Sig: TAKE 1 TABLET BY MOUTH DAILY       Hypertension Medications Protocol Passed - 9/6/2024 12:27 PM        Passed - CMP or BMP in past 12 months        Passed - Last BP reading less than 140/90     BP Readings from Last 1 Encounters:   05/29/24 126/82               Passed - In person appointment or virtual visit in the past 12 mos or appointment in next 3 mos     Recent Outpatient Visits              3 months ago Fibromyalgia    [facility], [facility] Everett Buckner MD    Office Visit    4 months ago Encounter for therapeutic drug level monitoring    [facility], [address] [place], [place] [address]Ina PA-C    Office Visit    7 months ago Herpes zoster without complication    [facility], [address] [place], [place] [address]Esthela Estrella PA-C    Office Visit    7 months ago Encounter for therapeutic drug level monitoring    [facility], [address] [place] [address] Ina Anderson PA-C    Office Visit    8 months ago Acute cough    [address] Esthela Velazquez PA-C    Office Visit          Future Appointments         Provider Department Appt Notes    In 1 month Ina Anderson PA-C [address] FOLLOW UP    In 2 months Everett Buckner MD [facility] 6 mo                    Passed - EGFRCR or GFRNAA > 50     GFR Evaluation  EGFRCR: 80 , resulted on 12/18/2023               Future Appointments         Provider Department Appt Notes    In 1 month Ina Anderson PA-C [address] FOLLOW UP    In 2 months Sita  Everett HAGEN MD [facility], [facility] 6 mo            Recent Outpatient Visits              3 months ago Fibromyalgia    [facility], [facility] Everett Buckner MD    Office Visit    4 months ago Encounter for therapeutic drug level monitoring    [facility], [address] [place] [address], Ina MURRAY PA-C    Office Visit    7 months ago Herpes zoster without complication    [facility], [address] [place], [place] [address]Esthela Robbins PA-C    Office Visit    7 months ago Encounter for therapeutic drug level monitoring    [facility], [address] [place], [place] [address]Ina PA-C    Office Visit    8 months ago Acute cough    [facility], [address] [place] [address], Esthela Corley PA-C    Office Visit

## 2024-09-11 ENCOUNTER — TELEPHONE (OUTPATIENT)
Dept: RHEUMATOLOGY | Facility: CLINIC | Age: 64
End: 2024-09-11

## 2024-09-13 ENCOUNTER — TELEPHONE (OUTPATIENT)
Dept: RHEUMATOLOGY | Facility: CLINIC | Age: 64
End: 2024-09-13

## 2024-09-13 NOTE — TELEPHONE ENCOUNTER
Called pharmacy to clarify. Pt was having issues refilling her zolpidem, but was using an old prescription number for refill. Pharmacy will fill last refill on the current script for her.

## 2024-10-05 ENCOUNTER — PATIENT MESSAGE (OUTPATIENT)
Facility: CLINIC | Age: 64
End: 2024-10-05

## 2024-10-07 ENCOUNTER — HOSPITAL ENCOUNTER (EMERGENCY)
Facility: HOSPITAL | Age: 64
Discharge: HOME | End: 2024-10-08
Attending: EMERGENCY MEDICINE
Payer: COMMERCIAL

## 2024-10-07 DIAGNOSIS — R07.9 ACUTE CHEST PAIN: Primary | ICD-10-CM

## 2024-10-07 LAB
ALBUMIN SERPL-MCNC: 4.2 G/DL (ref 3.2–4.8)
ALBUMIN/GLOB SERPL: 1.4 {RATIO} (ref 1–2)
ALP LIVER SERPL-CCNC: 104 U/L
ALT SERPL-CCNC: 15 U/L
ANION GAP SERPL CALC-SCNC: 8 MMOL/L (ref 0–18)
AST SERPL-CCNC: 20 U/L (ref ?–34)
BASOPHILS # BLD AUTO: 0.09 X10(3) UL (ref 0–0.2)
BASOPHILS NFR BLD AUTO: 0.8 %
BILIRUB SERPL-MCNC: 0.2 MG/DL (ref 0.2–1.1)
BUN BLD-MCNC: 12 MG/DL (ref 9–23)
CALCIUM BLD-MCNC: 9.8 MG/DL (ref 8.7–10.4)
CHLORIDE SERPL-SCNC: 107 MMOL/L (ref 98–112)
CO2 SERPL-SCNC: 25 MMOL/L (ref 21–32)
CREAT BLD-MCNC: 0.71 MG/DL
EGFRCR SERPLBLD CKD-EPI 2021: 95 ML/MIN/1.73M2 (ref 60–?)
EOSINOPHIL # BLD AUTO: 0.29 X10(3) UL (ref 0–0.7)
EOSINOPHIL NFR BLD AUTO: 2.6 %
ERYTHROCYTE [DISTWIDTH] IN BLOOD BY AUTOMATED COUNT: 13.8 %
GLOBULIN PLAS-MCNC: 3.1 G/DL (ref 2–3.5)
GLUCOSE BLD-MCNC: 94 MG/DL (ref 70–99)
HCT VFR BLD AUTO: 39.7 %
HGB BLD-MCNC: 13.3 G/DL
IMM GRANULOCYTES # BLD AUTO: 0.04 X10(3) UL (ref 0–1)
IMM GRANULOCYTES NFR BLD: 0.4 %
LYMPHOCYTES # BLD AUTO: 4.98 X10(3) UL (ref 1–4)
LYMPHOCYTES NFR BLD AUTO: 44.7 %
MCH RBC QN AUTO: 27.9 PG (ref 26–34)
MCHC RBC AUTO-ENTMCNC: 33.5 G/DL (ref 31–37)
MCV RBC AUTO: 83.4 FL
MONOCYTES # BLD AUTO: 0.81 X10(3) UL (ref 0.1–1)
MONOCYTES NFR BLD AUTO: 7.3 %
NEUTROPHILS # BLD AUTO: 4.93 X10 (3) UL (ref 1.5–7.7)
NEUTROPHILS # BLD AUTO: 4.93 X10(3) UL (ref 1.5–7.7)
NEUTROPHILS NFR BLD AUTO: 44.2 %
OSMOLALITY SERPL CALC.SUM OF ELEC: 290 MOSM/KG (ref 275–295)
PLATELET # BLD AUTO: 322 10(3)UL (ref 150–450)
POTASSIUM SERPL-SCNC: 3.5 MMOL/L (ref 3.5–5.1)
PROT SERPL-MCNC: 7.3 G/DL (ref 5.7–8.2)
RBC # BLD AUTO: 4.76 X10(6)UL
SODIUM SERPL-SCNC: 140 MMOL/L (ref 136–145)
TROPONIN I SERPL HS-MCNC: <3 NG/L
WBC # BLD AUTO: 11.1 X10(3) UL (ref 4–11)

## 2024-10-07 PROCEDURE — 36415 COLL VENOUS BLD VENIPUNCTURE: CPT

## 2024-10-07 PROCEDURE — 93010 ELECTROCARDIOGRAM REPORT: CPT

## 2024-10-07 PROCEDURE — 85025 COMPLETE CBC W/AUTO DIFF WBC: CPT

## 2024-10-07 PROCEDURE — 85025 COMPLETE CBC W/AUTO DIFF WBC: CPT | Performed by: EMERGENCY MEDICINE

## 2024-10-07 PROCEDURE — 80053 COMPREHEN METABOLIC PANEL: CPT

## 2024-10-07 PROCEDURE — 84484 ASSAY OF TROPONIN QUANT: CPT | Performed by: EMERGENCY MEDICINE

## 2024-10-07 PROCEDURE — 99285 EMERGENCY DEPT VISIT HI MDM: CPT

## 2024-10-07 PROCEDURE — 99284 EMERGENCY DEPT VISIT MOD MDM: CPT

## 2024-10-07 PROCEDURE — 93005 ELECTROCARDIOGRAM TRACING: CPT

## 2024-10-07 PROCEDURE — 84484 ASSAY OF TROPONIN QUANT: CPT

## 2024-10-07 PROCEDURE — 80053 COMPREHEN METABOLIC PANEL: CPT | Performed by: EMERGENCY MEDICINE

## 2024-10-07 NOTE — TELEPHONE ENCOUNTER
Last seen 5/9/24    10/28/2024 12:20 PM Ina Anderson, MARCOS [facility] [address]   11/18/2024 10:20 AM Everett Buckner MD [facility] [place]

## 2024-10-07 NOTE — TELEPHONE ENCOUNTER
From: Sena Reynoso  To: Ina Anderson  Sent: 10/5/2024 10:18 AM CDT  Subject: Compounded semaglutide    Hello,    I am sending a message regarding submitting a script for compounded semaglutide to replace my ozempic injections that was discussed in our prior visit. Can we please move forward with this if possible?

## 2024-10-08 ENCOUNTER — TELEPHONE (OUTPATIENT)
Dept: INTERNAL MEDICINE CLINIC | Facility: CLINIC | Age: 64
End: 2024-10-08

## 2024-10-08 ENCOUNTER — APPOINTMENT (OUTPATIENT)
Dept: GENERAL RADIOLOGY | Facility: HOSPITAL | Age: 64
End: 2024-10-08
Attending: EMERGENCY MEDICINE
Payer: COMMERCIAL

## 2024-10-08 VITALS
SYSTOLIC BLOOD PRESSURE: 148 MMHG | HEIGHT: 61 IN | HEART RATE: 73 BPM | RESPIRATION RATE: 20 BRPM | OXYGEN SATURATION: 98 % | WEIGHT: 220 LBS | TEMPERATURE: 98 F | DIASTOLIC BLOOD PRESSURE: 82 MMHG | BODY MASS INDEX: 41.54 KG/M2

## 2024-10-08 LAB
ATRIAL RATE: 90 BPM
P AXIS: 32 DEGREES
P-R INTERVAL: 146 MS
Q-T INTERVAL: 352 MS
QRS DURATION: 86 MS
QTC CALCULATION (BEZET): 430 MS
R AXIS: -24 DEGREES
T AXIS: -4 DEGREES
TROPONIN I SERPL HS-MCNC: <3 NG/L
VENTRICULAR RATE: 90 BPM

## 2024-10-08 PROCEDURE — 84484 ASSAY OF TROPONIN QUANT: CPT | Performed by: EMERGENCY MEDICINE

## 2024-10-08 PROCEDURE — 71045 X-RAY EXAM CHEST 1 VIEW: CPT | Performed by: EMERGENCY MEDICINE

## 2024-10-08 NOTE — ED PROVIDER NOTES
Patient Seen in: [facility] Emergency Department      History     Chief Complaint   Patient presents with    Chest Pain Angina     Stated Complaint: chest pain 45 min PTA    Subjective:   Patient is a 64-year-old female presents emergency room with chest pain that started tonight while watching dancing with a started.  Patient reports it lasted for approximately half an hour felt like a pressure that radiated to her back.  Patient reports that is completely resolved at this time she is not having any active chest pain she seen cardiology before but it has been more than 2 years.  She reports she sees the [facility] cardiologist group.  Patient has a history of high blood pressure and high cholesterol she has not been able to start her cholesterol medicine as she is having insurance issues.    The history is provided by the patient and the spouse.     ED History source :       Objective:     Past Medical History:    Arthritis    Diverticulosis of large intestine    Esophageal reflux    Fibroid    Fibromyalgia    Glaucoma    High blood pressure    HIGH CHOLESTEROL    HYPERTENSION    Osteoarthritis    Ovarian cyst              Past Surgical History:   Procedure Laterality Date    Appendectomy      Cholecystectomy  2000    Colonoscopy  07/2021    Colonoscopy N/A 9/19/2018    Procedure: COLONOSCOPY, POSSIBLE BIOPSY, POSSIBLE POLYPECTOMY 14852;  Surgeon: Luiz Cespedes MD;  Location: [facility]    Colonoscopy N/A 7/8/2021    Procedure: COLONOSCOPY;  Surgeon: Luiz Cespedes MD;  Location: [facility] ENDOSCOPY    Gastric bypass,obesity,sb reconstruc  2004    Other  2003    Left oophorectomy    Other      x2 shoulder and x1 elbow surgeries    Total abdom hysterectomy  2003    Fibroids    Total knee replacement Right 02/13/2019    Dr. Zaragoza                Social History     Socioeconomic History    Marital status:    Tobacco Use    Smoking status: Never    Smokeless tobacco: Never   Vaping Use     Vaping status: Never Used   Substance and Sexual Activity    Alcohol use: Not Currently     Alcohol/week: 0.0 standard drinks of alcohol     Comment: rare    Drug use: No    Sexual activity: Never   Other Topics Concern    Caffeine Concern Yes     Comment: coffee double espresso daily     Exercise Yes     Comment: walking daily     Social Determinants of Health      Received from [facility], [facility]    Social Connections    Received from [facility], [facility]    Housing Stability                  Physical Exam     ED Triage Vitals [10/07/24 2102]   /90   Pulse 88   Resp 19   Temp 97.7 °F (36.5 °C)   Temp src Temporal   SpO2 96 %   O2 Device None (Room air)       Current Vitals:   Vital Signs  BP: 150/79  Pulse: 81  Resp: 21  Temp: 97.7 °F (36.5 °C)  Temp src: Temporal  MAP (mmHg): 100    Oxygen Therapy  SpO2: 99 %  O2 Device: None (Room air)        Physical Exam  Vitals and nursing note reviewed.   Constitutional:       General: She is not in acute distress.     Appearance: She is well-developed. She is obese. She is not ill-appearing or toxic-appearing.   HENT:      Head: Normocephalic and atraumatic.   Cardiovascular:      Rate and Rhythm: Normal rate and regular rhythm.      Heart sounds: Normal heart sounds.   Pulmonary:      Effort: Pulmonary effort is normal.      Breath sounds: Normal breath sounds. No wheezing.   Chest:      Chest wall: No mass or tenderness.   Abdominal:      General: Bowel sounds are normal.      Palpations: Abdomen is soft.      Tenderness: There is no abdominal tenderness. There is no guarding or rebound.   Musculoskeletal:         General: Normal range of motion.      Cervical back: Normal range of motion and neck supple.      Right lower leg: No edema.      Left lower leg: No edema.   Skin:     General: Skin is warm.   Neurological:      General: No focal deficit present.      Mental Status: She  is alert and oriented to person, place, and time.   Psychiatric:         Mood and Affect: Mood normal.         Behavior: Behavior normal.             ED Course     Labs Reviewed   CBC WITH DIFFERENTIAL WITH PLATELET - Abnormal; Notable for the following components:       Result Value    WBC 11.1 (*)     Lymphocyte Absolute 4.98 (*)     All other components within normal limits   COMP METABOLIC PANEL (14) - Normal   TROPONIN I HIGH SENSITIVITY - Normal   TROPONIN I HIGH SENSITIVITY - Normal   RAINBOW DRAW BLUE     EKG    Rate, intervals and axes as noted on EKG Report.  Rate: 90  Rhythm: Sinus Rhythm  Reading: Normal sinus rhythm no ST elevation WV interval of 146 QRS of 86 QTc of 430 with axes of 30 2/2024/-4                Chest x-ray shows no focal consolidation pulmonary pleural effusion or pneumothorax normal cardiomediastinal silhouette.  No acute osseous abnormality.       MDM      Social -negative tobacco, negative etoh, negative drugs  Family History-noncontributory  Past Medical History-ovarian cyst, hypertension, osteoarthritis, glaucoma, diverticulosis, GERD, fibromyalgia, high cholesterol, high blood pressure    Differential diagnosis before testing included acute coronary syndrome, GERD, esophageal spasm, FPL rupture,, anxiety    Co-morbidities that add to the complexity of management include: History of hypertension, obesity, high cholesterol,    Testing ordered during this visit included chest x-ray EKG 2 sets of cardiac enzymes baseline labs    Radiographic images  I personally reviewed the radiographs and my individual interpretation shows no acute process  I also reviewed the official reports that showed Chest x-ray shows no focal consolidation pulmonary pleural effusion or pneumothorax normal cardiomediastinal silhouette.  No acute osseous abnormality.    External chart review showed review of Care Everywhere in [facility] system shows patient has extensive medical history including high cholesterol  osteoarthritis GERD fibromyalgia and high blood pressure    History obtained by an independent source included from patient, family    Discussion of management with patient, family    Social determinants of health that affect care include patient is very eager to be discharged home.  Will recommend she follows up closely with cardiology as I would recommend an outpatient stress test for this patient given her risk factors even if her cardiac workup is negative here tonight      Medications Provided: None    Course of Events during Emergency Room Visit include 64-year-old female who presents emergency room for chest pain that has since resolved since arrival.  Will get 2 sets of cardiac enzymes chest x-ray EKG and baseline labs.  If her 2 enzymes are negative plan for discharge home follow-up with cardiology.          Disposition:      Discharge  I have discussed with the patient the results of test, differential diagnosis, treatment plan, warning signs and symptoms which should prompt immediate return.  They expressed understanding of these instructions and agrees to the following plan provided.  They were given written discharge instructions and agrees to return for any concerns and voiced understanding and all questions were answered.       Medical Decision Making      Disposition and Plan     Clinical Impression:  1. Acute chest pain         Disposition:  Discharge  10/8/2024 12:50 am    Follow-up:  Josue Barclay MD  [address] [place] [address]  [phone]    Schedule an appointment as soon as possible for a visit      Pato Rodriguez MD  [address] [place] [address]  [phone]    Schedule an appointment as soon as possible for a visit            Medications Prescribed:  Current Discharge Medication List              Supplementary Documentation:

## 2024-10-08 NOTE — TELEPHONE ENCOUNTER
Received call from pt, pt asked for appointment for ER follow-up. Patient was seen in ER yesterday for chest pain, which has improved at this time. ER follow-up scheduled with Gala Velazquez for tomorrow. Patient also stated she has had rash underneath R breast for about 2 mos, itchy on/off. Patient would like Gala Velazquez to look at this at appointment as well. Appointment scheduled with Gala Velazquez for tomorrow.

## 2024-10-08 NOTE — ED INITIAL ASSESSMENT (HPI)
Chest heavy for past one hour. Left side with radiation to posterior left shoulder and arm pit. EKG done at 2058

## 2024-10-09 ENCOUNTER — OFFICE VISIT (OUTPATIENT)
Dept: INTERNAL MEDICINE CLINIC | Facility: CLINIC | Age: 64
End: 2024-10-09
Payer: COMMERCIAL

## 2024-10-09 VITALS
OXYGEN SATURATION: 96 % | DIASTOLIC BLOOD PRESSURE: 78 MMHG | HEIGHT: 60.24 IN | HEART RATE: 86 BPM | TEMPERATURE: 97 F | BODY MASS INDEX: 44.14 KG/M2 | WEIGHT: 227.81 LBS | SYSTOLIC BLOOD PRESSURE: 126 MMHG | RESPIRATION RATE: 18 BRPM

## 2024-10-09 DIAGNOSIS — R21 RASH AND NONSPECIFIC SKIN ERUPTION: ICD-10-CM

## 2024-10-09 DIAGNOSIS — R06.00 DYSPNEA, UNSPECIFIED TYPE: Primary | ICD-10-CM

## 2024-10-09 DIAGNOSIS — F41.9 ANXIETY: ICD-10-CM

## 2024-10-09 PROCEDURE — 3074F SYST BP LT 130 MM HG: CPT | Performed by: PHYSICIAN ASSISTANT

## 2024-10-09 PROCEDURE — G2211 COMPLEX E/M VISIT ADD ON: HCPCS | Performed by: PHYSICIAN ASSISTANT

## 2024-10-09 PROCEDURE — 3078F DIAST BP <80 MM HG: CPT | Performed by: PHYSICIAN ASSISTANT

## 2024-10-09 PROCEDURE — 99214 OFFICE O/P EST MOD 30 MIN: CPT | Performed by: PHYSICIAN ASSISTANT

## 2024-10-09 PROCEDURE — 3008F BODY MASS INDEX DOCD: CPT | Performed by: PHYSICIAN ASSISTANT

## 2024-10-09 RX ORDER — CLOTRIMAZOLE AND BETAMETHASONE DIPROPIONATE 10; .64 MG/G; MG/G
1 CREAM TOPICAL 2 TIMES DAILY PRN
Qty: 15 G | Refills: 0 | Status: SHIPPED | OUTPATIENT
Start: 2024-10-09

## 2024-10-09 NOTE — PROGRESS NOTES
Chief Complaint   Patient presents with    ER F/U     ES rm - 2 -  ED f/u from Acute chest pain       HPI:  Patient presents for ER follow-up.  Patient was seen 2 days ago in the ER after experiencing difficulty with with breathing.  She denies chest pain but reports that she felt a heaviness in her chest and a difficulty breathing.  Patient admits that the chest pressure radiated through to her back and into her left shoulder at the time.  This lasted for 45 minutes to an hour without resolution and so patient presented to the ER for further evaluation.  In the emergency room the patient's EKG did not show any acute changes though there were some changes consistent with possible old anterolateral infarct.  Chest x-ray did not show any acute changes.  Patient had 2 sets of troponins that were negative as well.  Patient admits to increased stress with some marital conflict as well as the loss of her dog recently.  She is wondering if her symptoms were related to anxiety.  She admits she has never had physical symptoms like this before.  She has not had any recurrence of symptoms since her ER visit.  Patient has begun counseling to help deal with the stress and anxiety she is dealing with.  Was instructed to follow-up with myself and cardiology on discharge from ER.  [facility]    Rash under R breast off and on for months.  Given a cream in [place] that helped some but did not give her resolution.  States its a cream used for \"sunburn.\"    Review of Systems   See above.  No other complaints today.    Past Medical History:    Arthritis    Diverticulosis of large intestine    Esophageal reflux    Fibroid    Fibromyalgia    Glaucoma    High blood pressure    HIGH CHOLESTEROL    HYPERTENSION    Osteoarthritis    Ovarian cyst       Patient Active Problem List   Diagnosis    Pernicious anemia    HTN (hypertension)    Morbid obesity with BMI of 40.0-44.9, adult (HCC)    Fibromyalgia    Primary  osteoarthritis of left knee    Rotator cuff tear arthropathy of both shoulders    Primary osteoarthritis of right knee/R TKR sx 2-13-19/Global Exp 5-13-19/TRK/EP    Status post total right knee replacement    Class 3 severe obesity due to excess calories with serious comorbidity and body mass index (BMI) of 40.0 to 44.9 in adult (HCC)    Chronic pain of both knees    Pre-diabetes    History of total abdominal hysterectomy    Osteoarthritis of cervical spine with myelopathy    Primary osteoarthritis of both hands    Statin intolerance    High cholesterol    Degenerative joint disease of cervical and lumbar spine       Current Outpatient Medications   Medication Sig Dispense Refill    clotrimazole-betamethasone 1-0.05 % External Cream Apply 1 Application topically 2 (two) times daily as needed. 15 g 0    lisinopril-hydroCHLOROthiazide 20-12.5 MG Oral Tab Take 1 tablet by mouth daily. 90 tablet 3    semaglutide (OZEMPIC, 0.25 OR 0.5 MG/DOSE,) 2 MG/3ML Subcutaneous Solution Pen-injector Inject 0.25 mg into the skin once a week. 3 mL 0    zolpidem 10 MG Oral Tab Take 1 tablet (10 mg total) by mouth nightly. 30 tablet 5    naproxen 500 MG Oral Tab Take 1 tablet (500 mg total) by mouth 2 (two) times daily as needed. 180 tablet 1    lidocaine 5 % External Patch Place 1 patch onto the skin daily. 30 each 0    metFORMIN  MG Oral Tablet 24 Hr Take 1 tablet (750 mg total) by mouth daily. 90 tablet 1    albuterol 108 (90 Base) MCG/ACT Inhalation Aero Soln Inhale 2 puffs into the lungs every 4 (four) hours as needed for Wheezing. 1 each 0    HYDROcodone-acetaminophen (NORCO) 5-325 MG Oral Tab Take 1 tablet by mouth every 6 (six) hours as needed for Pain. 120 tablet 0    cyclobenzaprine 10 MG Oral Tab Take 1 tablet (10 mg total) by mouth 3 (three) times daily as needed for Muscle spasms. 90 tablet 1    PANTOPRAZOLE 40 MG Oral Tab EC TAKE 1 TABLET(40 MG) BY MOUTH EVERY MORNING BEFORE BREAKFAST 90 tablet 2    REPATHA SURECLICK  140 MG/ML Subcutaneous Solution Auto-injector Inject 140 mg/mL into the skin every 14 (fourteen) days.      montelukast 10 MG Oral Tab Take 1 tablet (10 mg total) by mouth daily.      Zolpidem Tartrate ER 6.25 MG Oral Tab CR Take 1 tablet (6.25 mg total) by mouth nightly as needed for Sleep. 30 tablet 1    Fluticasone Propionate 50 MCG/ACT Nasal Suspension 1 spray by Nasal route daily.  0    alprazolam (XANAX) 0.5 MG Oral Tab Take 1 tablet by mouth nightly as needed. 20 tablet 0       Physical Exam  /78 (BP Location: Left arm, Patient Position: Sitting, Cuff Size: large)   Pulse 86   Temp 97 °F (36.1 °C) (Temporal)   Resp 18   Ht 5' 0.24\" (1.53 m)   Wt 227 lb 12.8 oz (103.3 kg)   SpO2 96%   BMI 44.14 kg/m²   Constitutional:  No distress.   HEENT:  Normocephalic and atraumatic. Tympanic membranes normal.  Nose normal. Oropharynx is clear and moist.   Eyes: Conjunctivae are normal. PERRLA.  Neck: Normal range of motion. Neck supple.   Cardiovascular: Normal rate, regular rhythm.  No murmur, rubs or gallops.   Pulmonary/Chest: Effort normal and breath sounds normal. No respiratory distress. No wheezes, rhonchi or rales  Abdominal: Soft. Bowel sounds are normal. Non tender, no masses, no organomegaly or hernias.  Lymphadenopathy: No cervical adenopathy.   Skin: Skin is warm and dry. + flesh colored round papular plaque noted under R breast, some satellite lesions.      Admission on 10/07/2024, Discharged on 10/08/2024   Component Date Value Ref Range Status    Ventricular rate 10/07/2024 90  BPM Final    Atrial rate 10/07/2024 90  BPM Final    P-R Interval 10/07/2024 146  ms Final    QRS Duration 10/07/2024 86  ms Final    Q-T Interval 10/07/2024 352  ms Final    QTC Calculation (Bezet) 10/07/2024 430  ms Final    P Axis 10/07/2024 32  degrees Final    R Axis 10/07/2024 -24  degrees Final    T Axis 10/07/2024 -4  degrees Final    WBC 10/07/2024 11.1 (H)  4.0 - 11.0 x10(3) uL Final    RBC 10/07/2024 4.76   3.80 - 5.30 x10(6)uL Final    HGB 10/07/2024 13.3  12.0 - 16.0 g/dL Final    HCT 10/07/2024 39.7  35.0 - 48.0 % Final    PLT 10/07/2024 322.0  150.0 - 450.0 10(3)uL Final    MCV 10/07/2024 83.4  80.0 - 100.0 fL Final    MCH 10/07/2024 27.9  26.0 - 34.0 pg Final    MCHC 10/07/2024 33.5  31.0 - 37.0 g/dL Final    RDW 10/07/2024 13.8  % Final    Neutrophil Absolute Prelim 10/07/2024 4.93  1.50 - 7.70 x10 (3) uL Final    Neutrophil Absolute 10/07/2024 4.93  1.50 - 7.70 x10(3) uL Final    Lymphocyte Absolute 10/07/2024 4.98 (H)  1.00 - 4.00 x10(3) uL Final    Monocyte Absolute 10/07/2024 0.81  0.10 - 1.00 x10(3) uL Final    Eosinophil Absolute 10/07/2024 0.29  0.00 - 0.70 x10(3) uL Final    Basophil Absolute 10/07/2024 0.09  0.00 - 0.20 x10(3) uL Final    Immature Granulocyte Absolute 10/07/2024 0.04  0.00 - 1.00 x10(3) uL Final    Neutrophil % 10/07/2024 44.2  % Final    Lymphocyte % 10/07/2024 44.7  % Final    Monocyte % 10/07/2024 7.3  % Final    Eosinophil % 10/07/2024 2.6  % Final    Basophil % 10/07/2024 0.8  % Final    Immature Granulocyte % 10/07/2024 0.4  % Final    Glucose 10/07/2024 94  70 - 99 mg/dL Final    Sodium 10/07/2024 140  136 - 145 mmol/L Final    Potassium 10/07/2024 3.5  3.5 - 5.1 mmol/L Final    Chloride 10/07/2024 107  98 - 112 mmol/L Final    CO2 10/07/2024 25.0  21.0 - 32.0 mmol/L Final    Anion Gap 10/07/2024 8  0 - 18 mmol/L Final    BUN 10/07/2024 12  9 - 23 mg/dL Final    Creatinine 10/07/2024 0.71  0.55 - 1.02 mg/dL Final    Calcium, Total 10/07/2024 9.8  8.7 - 10.4 mg/dL Final    Calculated Osmolality 10/07/2024 290  275 - 295 mOsm/kg Final    eGFR-Cr 10/07/2024 95  >=60 mL/min/1.73m2 Final    AST 10/07/2024 20  <34 U/L Final    ALT 10/07/2024 15  10 - 49 U/L Final    Alkaline Phosphatase 10/07/2024 104  50 - 130 U/L Final    Bilirubin, Total 10/07/2024 0.2  0.2 - 1.1 mg/dL Final    Total Protein 10/07/2024 7.3  5.7 - 8.2 g/dL Final    Albumin 10/07/2024 4.2  3.2 - 4.8 g/dL Final     Globulin  10/07/2024 3.1  2.0 - 3.5 g/dL Final    A/G Ratio 10/07/2024 1.4  1.0 - 2.0 Final    Troponin I (High Sensitivity) 10/07/2024 <3  <=34 ng/L Final    Hold Blue 10/07/2024 Auto Resulted   Final    Troponin I (High Sensitivity) 10/08/2024 <3  <=34 ng/L Final   ]    A/P:    Encounter Diagnoses   Name Primary?    Dyspnea, unspecified type -cardiac workup negative thus far.  Patient has scheduled follow-up with Dr. Chen.  ER warnings provided. Yes    Anxiety -patient has begun counseling and has first scheduled session next week.     Rash and nonspecific skin eruption -possibly fungal in origin.  Try Lotrisone.  Follow-up if not resolving.      Code selection for this visit was based on time spent (34 min) on date of service in preparing to see the patient, obtaining and/or reviewing separately obtained history, performing a medically appropriate examination, counseling and educating the patient/family/caregiver, ordering medications or testing, referring and communicating with other healthcare providers, documenting clinical information in the EHR, independently interpreting results and communicating results to the patient/family/caregiver and care coordination with the patient's other providers.      No orders of the defined types were placed in this encounter.      Meds & Refills for this Visit:  Requested Prescriptions     Signed Prescriptions Disp Refills    clotrimazole-betamethasone 1-0.05 % External Cream 15 g 0     Sig: Apply 1 Application topically 2 (two) times daily as needed.       Imaging & Consults:  None    Return in about 2 months (around 12/9/2024) for Annual physical.  There are no Patient Instructions on file for this visit.    All questions were answered and the patient understands the plan.

## 2024-10-21 NOTE — TELEPHONE ENCOUNTER
Semaglutide 0.25mg    Semaglutide 0.25mg/Cyanocobolamin    1/0.5 mg/mL  Inject 25 units/0.25mL into skin q weekly    Disp: 1mL vial    Discussed with pt at length that combination therapy is considered off label use and not FDA approved, patient was advised of theoretical risk of combination therapy and risks that we may not be aware of as it's not been studied clinically

## 2024-10-25 ENCOUNTER — HOSPITAL ENCOUNTER (OUTPATIENT)
Dept: BONE DENSITY | Age: 64
Discharge: HOME | End: 2024-10-25
Attending: PHYSICIAN ASSISTANT
Payer: COMMERCIAL

## 2024-10-25 ENCOUNTER — TELEPHONE (OUTPATIENT)
Dept: INTERNAL MEDICINE CLINIC | Facility: CLINIC | Age: 64
End: 2024-10-25

## 2024-10-25 DIAGNOSIS — Z12.31 ENCOUNTER FOR SCREENING MAMMOGRAM FOR MALIGNANT NEOPLASM OF BREAST: Primary | ICD-10-CM

## 2024-10-25 DIAGNOSIS — Z78.0 POST-MENOPAUSAL: ICD-10-CM

## 2024-10-25 PROCEDURE — 77080 DXA BONE DENSITY AXIAL: CPT | Performed by: PHYSICIAN ASSISTANT

## 2024-10-25 NOTE — TELEPHONE ENCOUNTER
Last mammo 11/28/23:    Impression   CONCLUSION:       BI-RADS CATEGORY:    DIAGNOSTIC CATEGORY 1--NEGATIVE ASSESSMENT.       RECOMMENDATIONS:    ROUTINE MAMMOGRAM AND CLINICAL EVALUATION IN 12 MONTHS.       LOV 10/9/24. Order placed per protocol.     MC sent, postponing to ensure read.

## 2024-11-12 ENCOUNTER — NURSE TRIAGE (OUTPATIENT)
Dept: INTERNAL MEDICINE CLINIC | Facility: CLINIC | Age: 64
End: 2024-11-12

## 2024-11-12 NOTE — TELEPHONE ENCOUNTER
Do not see pt went to UC. Called and spoke to pt. Patient said she did not go because she took benadryl and then went to sleep. Rash has not spread since we spoke earlier, actually looks like it improved some. No shortness of breath/changes in breathing. Patient feels ok to wait until appointment tomorrow. UC/ER warnings provided for any new/worsening sxs. Patient stated understanding.

## 2024-11-12 NOTE — TELEPHONE ENCOUNTER
Action Requested: Summary for Provider     []  Critical Lab, Recommendations Needed  [] Need Additional Advice  [x]   ALMA    []   Need Orders  [] Need Medications Sent to Pharmacy  []  Other     SUMMARY: ALMA Velazquez     Received call from pt. Patient said about 2 hours ago she noticed pain by her umbilicus, when she looked at area she noticed a rash. Rash is spreading across abd. Patient denies any new foods. Stated only new med was semaglutide, given 2 days ago for first time in abd. Denies tingling/swelling in mouth/throat. No trouble breathing, but pt stated it feels different when she breaths in. Patient has taken benadryl. Advised pt to be seen in UC or ER now, concern for allergic reaction. Patient asking to follow-up with Gala Velazquez after, scheduled pt for follow-up tomorrow, but again advised UC/ER now. Patient stated understanding and agreed to plan.     Reason for call: Rash  Onset: 2 hours ago    Reason for Disposition   Patient wants to be seen    Protocols used: Rash or Redness - Widespread-A-[place]

## 2024-11-13 ENCOUNTER — OFFICE VISIT (OUTPATIENT)
Dept: INTERNAL MEDICINE CLINIC | Facility: CLINIC | Age: 64
End: 2024-11-13
Payer: COMMERCIAL

## 2024-11-13 VITALS
DIASTOLIC BLOOD PRESSURE: 72 MMHG | OXYGEN SATURATION: 98 % | BODY MASS INDEX: 43.79 KG/M2 | RESPIRATION RATE: 18 BRPM | HEIGHT: 60.24 IN | SYSTOLIC BLOOD PRESSURE: 128 MMHG | WEIGHT: 226 LBS | HEART RATE: 80 BPM

## 2024-11-13 DIAGNOSIS — L53.9 NASAL ERYTHEMA: ICD-10-CM

## 2024-11-13 DIAGNOSIS — R21 RASH AND NONSPECIFIC SKIN ERUPTION: Primary | ICD-10-CM

## 2024-11-13 PROCEDURE — 3078F DIAST BP <80 MM HG: CPT | Performed by: PHYSICIAN ASSISTANT

## 2024-11-13 PROCEDURE — 3008F BODY MASS INDEX DOCD: CPT | Performed by: PHYSICIAN ASSISTANT

## 2024-11-13 PROCEDURE — 99213 OFFICE O/P EST LOW 20 MIN: CPT | Performed by: PHYSICIAN ASSISTANT

## 2024-11-13 PROCEDURE — G2211 COMPLEX E/M VISIT ADD ON: HCPCS | Performed by: PHYSICIAN ASSISTANT

## 2024-11-13 PROCEDURE — 3074F SYST BP LT 130 MM HG: CPT | Performed by: PHYSICIAN ASSISTANT

## 2024-11-13 RX ORDER — MUPIROCIN 20 MG/G
1 OINTMENT TOPICAL 3 TIMES DAILY
Qty: 30 G | Refills: 0 | Status: SHIPPED | OUTPATIENT
Start: 2024-11-13

## 2024-11-13 NOTE — PROGRESS NOTES
Chief Complaint   Patient presents with    Rash     Room # 6 BK       HPI:  Pt presents with c/o rash to her abdomen x 2 days.  It is blotchy and red and covered most of her lower abdomen when first discovered.  The only new medication is semaglutide which was started on Sunday.  The injection was given in the right side of her abdomen and appears to be unrelated to the rash.  The rash is not itchy or painful.  She does have a little bit of pain she has noted inher umbilicus.  No fever or chills.  Patient did take some Benadryl at the instruction of one of our nurses.  The rash did improve with Benadryl.  Patient is also complaining of a mild sore throat primarily on the left side.  She is having mild nasal drainage and has had nasal irritation in both nasal passages for the last week.  No f/c.  No cough.    Review of Systems   See HPI.  No other complaints today.    Past Medical History:    Arthritis    Diverticulosis of large intestine    Esophageal reflux    Fibroid    Fibromyalgia    Glaucoma    High blood pressure    HIGH CHOLESTEROL    HYPERTENSION    Osteoarthritis    Ovarian cyst       Patient Active Problem List   Diagnosis    Pernicious anemia    HTN (hypertension)    Morbid obesity with BMI of 40.0-44.9, adult (HCC)    Fibromyalgia    Primary osteoarthritis of left knee    Rotator cuff tear arthropathy of both shoulders    Primary osteoarthritis of right knee/R TKR sx 2-13-19/Global Exp 5-13-19/TRK/EP    Status post total right knee replacement    Class 3 severe obesity due to excess calories with serious comorbidity and body mass index (BMI) of 40.0 to 44.9 in adult (HCC)    Chronic pain of both knees    Pre-diabetes    History of total abdominal hysterectomy    Osteoarthritis of cervical spine with myelopathy    Primary osteoarthritis of both hands    Statin intolerance    High cholesterol    Degenerative joint disease of cervical and lumbar spine       Current Outpatient Medications   Medication Sig  Dispense Refill    mupirocin 2 % External Ointment Apply 1 Application topically 3 (three) times daily. 30 g 0    CUSTOM MEDICATION Semaglutide 0.25mg    Semaglutide 0.25mg/Cyanocobolamin    1/0.5 mg/mL  Inject 25 units/0.25mL into skin q weekly    Disp: 1mL vial 1 each 0    clotrimazole-betamethasone 1-0.05 % External Cream Apply 1 Application topically 2 (two) times daily as needed. 15 g 0    lisinopril-hydroCHLOROthiazide 20-12.5 MG Oral Tab Take 1 tablet by mouth daily. 90 tablet 3    semaglutide (OZEMPIC, 0.25 OR 0.5 MG/DOSE,) 2 MG/3ML Subcutaneous Solution Pen-injector Inject 0.25 mg into the skin once a week. 3 mL 0    zolpidem 10 MG Oral Tab Take 1 tablet (10 mg total) by mouth nightly. 30 tablet 5    naproxen 500 MG Oral Tab Take 1 tablet (500 mg total) by mouth 2 (two) times daily as needed. 180 tablet 1    lidocaine 5 % External Patch Place 1 patch onto the skin daily. 30 each 0    metFORMIN  MG Oral Tablet 24 Hr Take 1 tablet (750 mg total) by mouth daily. 90 tablet 1    albuterol 108 (90 Base) MCG/ACT Inhalation Aero Soln Inhale 2 puffs into the lungs every 4 (four) hours as needed for Wheezing. 1 each 0    HYDROcodone-acetaminophen (NORCO) 5-325 MG Oral Tab Take 1 tablet by mouth every 6 (six) hours as needed for Pain. 120 tablet 0    cyclobenzaprine 10 MG Oral Tab Take 1 tablet (10 mg total) by mouth 3 (three) times daily as needed for Muscle spasms. 90 tablet 1    PANTOPRAZOLE 40 MG Oral Tab EC TAKE 1 TABLET(40 MG) BY MOUTH EVERY MORNING BEFORE BREAKFAST 90 tablet 2    REPATHA SURECLICK 140 MG/ML Subcutaneous Solution Auto-injector Inject 140 mg/mL into the skin every 14 (fourteen) days.      montelukast 10 MG Oral Tab Take 1 tablet (10 mg total) by mouth daily.      Zolpidem Tartrate ER 6.25 MG Oral Tab CR Take 1 tablet (6.25 mg total) by mouth nightly as needed for Sleep. 30 tablet 1    alprazolam (XANAX) 0.5 MG Oral Tab Take 1 tablet by mouth nightly as needed. 20 tablet 0    Fluticasone  Propionate 50 MCG/ACT Nasal Suspension 1 spray by Nasal route daily. (Patient not taking: Reported on 11/13/2024)  0       Physical Exam  /72   Pulse 80   Resp 18   Ht 5' 0.24\" (1.53 m)   Wt 226 lb (102.5 kg)   SpO2 98%   BMI 43.79 kg/m²   Constitutional:  No distress.   HEENT:  Normocephalic and atraumatic. Nose shows erythema and swelling in B nasal passages.  No lesions or crusting.  Oropharynx is clear and moist.   Eyes: Conjunctivae are normal.  Neck: Neck supple.   Cardiovascular: Normal rate, regular rhythm.  No murmur, rubs or gallops.   Pulmonary/Chest: Effort normal and breath sounds normal. No respiratory distress. No wheezes, rhonchi or rales  Lymphadenopathy: No cervical adenopathy.   Skin: Skin is warm and dry. + patchy erythematous papular eruption noted to lower anterior abd B.  Non-tender.  No pustules or bullae.  R side of lower abd with small area of ecchymosis with no surrounding erythema or rash, non-tender.      A/P:    Encounter Diagnoses   Name Primary?    Rash and nonspecific skin eruption - unclear etiology, I don't think this is an allergic reaction to Seaglutide, seems to be unrelated in proximity to injection site.  Also seems to be improving with time.  Not pruritic.  ? Infection.  Try topical steroid.  F/U in 1 week if not resolving, sooner if needed.   Yes    Nasal erythema - likely infectious.  Will try Mupirocin for 7 days and re-evaluate.  Return sooner if new questions or problems.          No orders of the defined types were placed in this encounter.      Meds & Refills for this Visit:  Requested Prescriptions     Signed Prescriptions Disp Refills    mupirocin 2 % External Ointment 30 g 0     Sig: Apply 1 Application topically 3 (three) times daily.       Imaging & Consults:  None    Return in about 1 week (around 11/20/2024) for rash etc.  There are no Patient Instructions on file for this visit.    All questions were answered and the patient understands the plan.

## 2024-11-18 ENCOUNTER — OFFICE VISIT (OUTPATIENT)
Dept: RHEUMATOLOGY | Facility: CLINIC | Age: 64
End: 2024-11-18
Payer: COMMERCIAL

## 2024-11-18 VITALS
WEIGHT: 226 LBS | HEART RATE: 94 BPM | BODY MASS INDEX: 42.67 KG/M2 | DIASTOLIC BLOOD PRESSURE: 70 MMHG | RESPIRATION RATE: 18 BRPM | SYSTOLIC BLOOD PRESSURE: 138 MMHG | HEIGHT: 61 IN | OXYGEN SATURATION: 99 % | TEMPERATURE: 98 F

## 2024-11-18 DIAGNOSIS — M47.812 DEGENERATIVE JOINT DISEASE OF CERVICAL AND LUMBAR SPINE: ICD-10-CM

## 2024-11-18 DIAGNOSIS — Z96.651 STATUS POST TOTAL RIGHT KNEE REPLACEMENT: ICD-10-CM

## 2024-11-18 DIAGNOSIS — E66.01 CLASS 3 SEVERE OBESITY DUE TO EXCESS CALORIES WITH SERIOUS COMORBIDITY AND BODY MASS INDEX (BMI) OF 40.0 TO 44.9 IN ADULT (HCC): ICD-10-CM

## 2024-11-18 DIAGNOSIS — M19.042 PRIMARY OSTEOARTHRITIS OF BOTH HANDS: ICD-10-CM

## 2024-11-18 DIAGNOSIS — M19.041 PRIMARY OSTEOARTHRITIS OF BOTH HANDS: ICD-10-CM

## 2024-11-18 DIAGNOSIS — M17.12 PRIMARY OSTEOARTHRITIS OF LEFT KNEE: Primary | ICD-10-CM

## 2024-11-18 DIAGNOSIS — E66.813 CLASS 3 SEVERE OBESITY DUE TO EXCESS CALORIES WITH SERIOUS COMORBIDITY AND BODY MASS INDEX (BMI) OF 40.0 TO 44.9 IN ADULT (HCC): ICD-10-CM

## 2024-11-18 DIAGNOSIS — M17.11 PRIMARY OSTEOARTHRITIS OF RIGHT KNEE: ICD-10-CM

## 2024-11-18 DIAGNOSIS — M47.816 DEGENERATIVE JOINT DISEASE OF CERVICAL AND LUMBAR SPINE: ICD-10-CM

## 2024-11-18 DIAGNOSIS — M79.7 FIBROMYALGIA: ICD-10-CM

## 2024-11-18 PROCEDURE — 3008F BODY MASS INDEX DOCD: CPT | Performed by: INTERNAL MEDICINE

## 2024-11-18 PROCEDURE — 99214 OFFICE O/P EST MOD 30 MIN: CPT | Performed by: INTERNAL MEDICINE

## 2024-11-18 PROCEDURE — 3075F SYST BP GE 130 - 139MM HG: CPT | Performed by: INTERNAL MEDICINE

## 2024-11-18 PROCEDURE — 3078F DIAST BP <80 MM HG: CPT | Performed by: INTERNAL MEDICINE

## 2024-11-18 RX ORDER — ZOLPIDEM TARTRATE 10 MG/1
10 TABLET ORAL NIGHTLY
Qty: 30 TABLET | Refills: 5 | Status: SHIPPED | OUTPATIENT
Start: 2024-11-25 | End: 2025-11-20

## 2024-11-18 RX ORDER — HYDROCODONE BITARTRATE AND ACETAMINOPHEN 5; 325 MG/1; MG/1
1 TABLET ORAL EVERY 6 HOURS PRN
Qty: 120 TABLET | Refills: 0 | Status: SHIPPED | OUTPATIENT
Start: 2024-11-25

## 2024-11-18 RX ORDER — NAPROXEN 500 MG/1
500 TABLET ORAL 2 TIMES DAILY PRN
Qty: 180 TABLET | Refills: 1 | Status: SHIPPED | OUTPATIENT
Start: 2024-11-18

## 2024-11-18 RX ORDER — PANTOPRAZOLE SODIUM 40 MG/1
40 TABLET, DELAYED RELEASE ORAL
Qty: 90 TABLET | Refills: 2 | Status: SHIPPED | OUTPATIENT
Start: 2024-11-18

## 2024-11-18 NOTE — TELEPHONE ENCOUNTER
Requesting   Requested Prescriptions     Pending Prescriptions Disp Refills    METFORMIN  MG Oral Tablet 24 Hr [Pharmacy Med Name: METFORMIN ER 750MG 24HR TABS] 180 tablet 0     Sig: TAKE 2 TABLETS(1500 MG) BY MOUTH DAILY      LOV: [id number]  RTC: 3-6mo  Last Relevant Labs:   Filled: 1/24/24 #90 with 1 refills    Future Appointments   Date Time Provider Department Center   11/29/2024  1:40 PM [facility] [address]   12/26/2024  3:00 PM Esthela Velazquez PA-C [facility] [address] [facility]   3/10/2025  2:00 PM Ina Anderson PA-C [facility] [address]

## 2024-11-18 NOTE — PATIENT INSTRUCTIONS
At [facility] orthopedics hand specialist Dr. Leo Encinas.[phone].  Naproxen 500 mg one or two per day.   Norco 5 mg as needed generally a few per week.  Voltaren gel use on the left knee 3 times per day.  Lose weight that will help the knee pain.  Zolpidem 10 mg at night prn.  Pantoprozole 40 mg per day.    Return to office 6 months.

## 2024-11-20 RX ORDER — METFORMIN HYDROCHLORIDE 750 MG/1
1500 TABLET, EXTENDED RELEASE ORAL DAILY
Qty: 180 TABLET | Refills: 0 | Status: SHIPPED | OUTPATIENT
Start: 2024-11-20

## 2024-11-29 ENCOUNTER — HOSPITAL ENCOUNTER (OUTPATIENT)
Dept: MAMMOGRAPHY | Age: 64
Discharge: HOME | End: 2024-11-29
Attending: PHYSICIAN ASSISTANT
Payer: COMMERCIAL

## 2024-11-29 DIAGNOSIS — Z12.31 ENCOUNTER FOR SCREENING MAMMOGRAM FOR MALIGNANT NEOPLASM OF BREAST: ICD-10-CM

## 2024-11-29 PROCEDURE — 77063 BREAST TOMOSYNTHESIS BI: CPT | Performed by: PHYSICIAN ASSISTANT

## 2024-11-29 PROCEDURE — 77067 SCR MAMMO BI INCL CAD: CPT | Performed by: PHYSICIAN ASSISTANT

## 2024-12-09 NOTE — TELEPHONE ENCOUNTER
Requesting   Requested Prescriptions     Pending Prescriptions Disp Refills    CUSTOM MEDICATION 1 each 0     Sig: Semaglutide 0.25mg    Semaglutide 0.25mg/Cyanocobolamin    1/0.5 mg/mL  Inject 25 units/0.25mL into skin q weekly    Disp: 1mL vial      LOV: 05/09/24  RTC: 3-6mo  Last Relevant Labs:   Filled: 10/21/24 #1ml 0.25mg with 0 refills    Future Appointments   Date Time Provider Department Center   12/30/2024  1:00 PM Josue Barclay MD [facility] [address] [facility]   3/10/2025  2:00 PM Ina Anderson PA-C [facility] [address]

## 2024-12-10 NOTE — TELEPHONE ENCOUNTER
Called to [facility] I spoke to Zuleyka      Can we please call into [facility] pharmacy?     Semaglutide 0.5mg     Semaglutide/Cyanocobalamin 1mg/0.5 ml     Inject 50 units/0.5ml subcutaneous weekly      Disp: 2.5ml

## 2024-12-10 NOTE — TELEPHONE ENCOUNTER
Can we please call into [facility] pharmacy?    Semaglutide 0.5mg    Semaglutide/Cyanocobalamin 1mg/0.5 ml    Inject 50 units/0.5ml subcutaneous weekly     Disp: 2.5ml

## 2024-12-12 ENCOUNTER — LAB ENCOUNTER (OUTPATIENT)
Dept: LAB | Age: 64
End: 2024-12-12
Attending: INTERNAL MEDICINE
Payer: COMMERCIAL

## 2024-12-12 DIAGNOSIS — E78.5 HYPERLIPEMIA: Primary | ICD-10-CM

## 2024-12-12 LAB
CHOLEST SERPL-MCNC: 201 MG/DL (ref ?–200)
FASTING PATIENT LIPID ANSWER: YES
HDLC SERPL-MCNC: 55 MG/DL (ref 40–59)
LDLC SERPL CALC-MCNC: 124 MG/DL (ref ?–100)
NONHDLC SERPL-MCNC: 146 MG/DL (ref ?–130)
TRIGL SERPL-MCNC: 124 MG/DL (ref 30–149)
VLDLC SERPL CALC-MCNC: 22 MG/DL (ref 0–30)

## 2024-12-12 PROCEDURE — 36415 COLL VENOUS BLD VENIPUNCTURE: CPT

## 2024-12-12 PROCEDURE — 80061 LIPID PANEL: CPT

## 2024-12-19 ENCOUNTER — TELEPHONE (OUTPATIENT)
Dept: INTERNAL MEDICINE CLINIC | Facility: CLINIC | Age: 64
End: 2024-12-19

## 2024-12-19 DIAGNOSIS — R93.89 ABNORMAL CT SCAN: ICD-10-CM

## 2024-12-19 DIAGNOSIS — K76.9 LIVER LESION: Primary | ICD-10-CM

## 2024-12-21 NOTE — TELEPHONE ENCOUNTER
Will need further imaging for eval.  I have placed an order for an MRI abd.  Please call pt and let her know to schedule.

## 2024-12-23 NOTE — TELEPHONE ENCOUNTER
Called patients home phone, no answer, no vm box.   Called patient cell, spoke to patient and informed of below, states she is aware of this on her liver, it has been there for many many years.   Central scheduling phone number given to patient. Will call and schedule MRI

## 2024-12-30 ENCOUNTER — OFFICE VISIT (OUTPATIENT)
Dept: INTERNAL MEDICINE CLINIC | Facility: CLINIC | Age: 64
End: 2024-12-30
Payer: COMMERCIAL

## 2024-12-30 VITALS
HEIGHT: 60.63 IN | SYSTOLIC BLOOD PRESSURE: 124 MMHG | WEIGHT: 224.19 LBS | DIASTOLIC BLOOD PRESSURE: 76 MMHG | OXYGEN SATURATION: 97 % | HEART RATE: 113 BPM | BODY MASS INDEX: 42.88 KG/M2 | RESPIRATION RATE: 18 BRPM | TEMPERATURE: 97 F

## 2024-12-30 DIAGNOSIS — Z90.710 HISTORY OF TOTAL ABDOMINAL HYSTERECTOMY: ICD-10-CM

## 2024-12-30 DIAGNOSIS — E66.01 CLASS 3 SEVERE OBESITY DUE TO EXCESS CALORIES WITH SERIOUS COMORBIDITY AND BODY MASS INDEX (BMI) OF 40.0 TO 44.9 IN ADULT (HCC): ICD-10-CM

## 2024-12-30 DIAGNOSIS — E78.00 HIGH CHOLESTEROL: ICD-10-CM

## 2024-12-30 DIAGNOSIS — M79.7 FIBROMYALGIA: ICD-10-CM

## 2024-12-30 DIAGNOSIS — M25.562 CHRONIC PAIN OF BOTH KNEES: ICD-10-CM

## 2024-12-30 DIAGNOSIS — M47.816 DEGENERATIVE JOINT DISEASE OF CERVICAL AND LUMBAR SPINE: ICD-10-CM

## 2024-12-30 DIAGNOSIS — M17.12 PRIMARY OSTEOARTHRITIS OF LEFT KNEE: ICD-10-CM

## 2024-12-30 DIAGNOSIS — E66.01 MORBID OBESITY WITH BMI OF 40.0-44.9, ADULT (HCC): ICD-10-CM

## 2024-12-30 DIAGNOSIS — M19.041 PRIMARY OSTEOARTHRITIS OF BOTH HANDS: ICD-10-CM

## 2024-12-30 DIAGNOSIS — Z96.651 STATUS POST TOTAL RIGHT KNEE REPLACEMENT: ICD-10-CM

## 2024-12-30 DIAGNOSIS — M17.11 PRIMARY OSTEOARTHRITIS OF RIGHT KNEE: ICD-10-CM

## 2024-12-30 DIAGNOSIS — G89.29 CHRONIC PAIN OF BOTH KNEES: ICD-10-CM

## 2024-12-30 DIAGNOSIS — Z00.00 ROUTINE GENERAL MEDICAL EXAMINATION AT A HEALTH CARE FACILITY: Primary | ICD-10-CM

## 2024-12-30 DIAGNOSIS — Z78.9 STATIN INTOLERANCE: ICD-10-CM

## 2024-12-30 DIAGNOSIS — M19.042 PRIMARY OSTEOARTHRITIS OF BOTH HANDS: ICD-10-CM

## 2024-12-30 DIAGNOSIS — E66.813 CLASS 3 SEVERE OBESITY DUE TO EXCESS CALORIES WITH SERIOUS COMORBIDITY AND BODY MASS INDEX (BMI) OF 40.0 TO 44.9 IN ADULT (HCC): ICD-10-CM

## 2024-12-30 DIAGNOSIS — I10 PRIMARY HYPERTENSION: ICD-10-CM

## 2024-12-30 DIAGNOSIS — M47.812 DEGENERATIVE JOINT DISEASE OF CERVICAL AND LUMBAR SPINE: ICD-10-CM

## 2024-12-30 DIAGNOSIS — M25.561 CHRONIC PAIN OF BOTH KNEES: ICD-10-CM

## 2024-12-30 PROCEDURE — 3008F BODY MASS INDEX DOCD: CPT | Performed by: INTERNAL MEDICINE

## 2024-12-30 PROCEDURE — 3074F SYST BP LT 130 MM HG: CPT | Performed by: INTERNAL MEDICINE

## 2024-12-30 PROCEDURE — 99214 OFFICE O/P EST MOD 30 MIN: CPT | Performed by: INTERNAL MEDICINE

## 2024-12-30 PROCEDURE — 99396 PREV VISIT EST AGE 40-64: CPT | Performed by: INTERNAL MEDICINE

## 2024-12-30 PROCEDURE — 90715 TDAP VACCINE 7 YRS/> IM: CPT | Performed by: INTERNAL MEDICINE

## 2024-12-30 PROCEDURE — 90471 IMMUNIZATION ADMIN: CPT | Performed by: INTERNAL MEDICINE

## 2024-12-30 PROCEDURE — 3078F DIAST BP <80 MM HG: CPT | Performed by: INTERNAL MEDICINE

## 2024-12-30 RX ORDER — CYCLOBENZAPRINE HCL 10 MG
10 TABLET ORAL 3 TIMES DAILY
Qty: 90 TABLET | Refills: 1 | OUTPATIENT
Start: 2024-12-30

## 2024-12-31 RX ORDER — ZOLPIDEM TARTRATE 10 MG/1
10 TABLET ORAL NIGHTLY
Qty: 30 TABLET | Refills: 0 | OUTPATIENT
Start: 2024-12-31

## 2024-12-31 RX ORDER — CYCLOBENZAPRINE HCL 10 MG
10 TABLET ORAL 3 TIMES DAILY
Qty: 90 TABLET | Refills: 1 | Status: SHIPPED | OUTPATIENT
Start: 2024-12-31

## 2024-12-31 NOTE — TELEPHONE ENCOUNTER
Future Appointments   Date Time Provider Department Center   3/10/2025  2:00 PM Ina Anderson, MARCOS [facility] [address]     Last office visit: 11/18/2024    Last fill: 11/25/2024 30 tab, 5 refills (zolpidem) 11/30/2023 90 tab, 1 refill (cyclobenzaprin)    Sending pt my chart message that there are fills of zolpidem and the pharmacy.

## 2025-01-06 NOTE — PROGRESS NOTES
Subjective:   Patient ID: Sena Reynoso is a 64 year old female.    /76 (BP Location: Left arm, Patient Position: Sitting, Cuff Size: large)   Pulse 113   Temp 97 °F (36.1 °C) (Temporal)   Resp 18   Ht 5' 0.63\" (1.54 m)   Wt 224 lb 3.2 oz (101.7 kg)   SpO2 97%   BMI 42.88 kg/m²     HPI  Here for pe, follows with rheum for chronic pain, oa, fibromyalgia, htn hyperchol, on repatha, statin intolerance  History/Other:   Review of Systems   Constitutional:  Negative for fever.   HENT:  Negative for congestion.    Eyes:  Negative for visual disturbance.   Respiratory:  Negative for shortness of breath.    Cardiovascular:  Negative for chest pain.   Gastrointestinal:  Negative for abdominal pain.   Endocrine: Negative for polyuria.   Genitourinary:  Negative for dysuria.   Musculoskeletal:  Negative for arthralgias.   Skin:  Negative for rash.   Neurological:  Negative for headaches.   Psychiatric/Behavioral:  Negative for confusion.      Current Outpatient Medications   Medication Sig Dispense Refill    CUSTOM MEDICATION Semaglutide 0.5mg    Semaglutide/Cyanocobalamin 1mg/0.5 ml    Inject 50 units/0.5ml subcutaneous weekly     Disp: 2.5ml 2.5 each 0    METFORMIN  MG Oral Tablet 24 Hr TAKE 2 TABLETS(1500 MG) BY MOUTH DAILY 180 tablet 0    naproxen 500 MG Oral Tab Take 1 tablet (500 mg total) by mouth 2 (two) times daily as needed. 180 tablet 1    zolpidem 10 MG Oral Tab Take 1 tablet (10 mg total) by mouth nightly. 30 tablet 5    pantoprazole 40 MG Oral Tab EC Take 1 tablet (40 mg total) by mouth before breakfast. 90 tablet 2    HYDROcodone-acetaminophen (NORCO) 5-325 MG Oral Tab Take 1 tablet by mouth every 6 (six) hours as needed for Pain. 120 tablet 0    mupirocin 2 % External Ointment Apply 1 Application topically 3 (three) times daily. 30 g 0    clotrimazole-betamethasone 1-0.05 % External Cream Apply 1 Application topically 2 (two) times daily as needed. 15 g 0    lisinopril-hydroCHLOROthiazide  20-12.5 MG Oral Tab Take 1 tablet by mouth daily. 90 tablet 3    semaglutide (OZEMPIC, 0.25 OR 0.5 MG/DOSE,) 2 MG/3ML Subcutaneous Solution Pen-injector Inject 0.25 mg into the skin once a week. 3 mL 0    lidocaine 5 % External Patch Place 1 patch onto the skin daily. 30 each 0    albuterol 108 (90 Base) MCG/ACT Inhalation Aero Soln Inhale 2 puffs into the lungs every 4 (four) hours as needed for Wheezing. 1 each 0    REPATHA SURECLICK 140 MG/ML Subcutaneous Solution Auto-injector Inject 140 mg/mL into the skin every 14 (fourteen) days.      Fluticasone Propionate 50 MCG/ACT Nasal Suspension 1 spray by Nasal route daily.  0    alprazolam (XANAX) 0.5 MG Oral Tab Take 1 tablet by mouth nightly as needed. 20 tablet 0    CYCLOBENZAPRINE 10 MG Oral Tab TAKE 1 TABLET(10 MG) BY MOUTH THREE TIMES DAILY 90 tablet 1     Allergies:Allergies[1]    Objective:   Physical Exam  Constitutional:       Appearance: Normal appearance.   HENT:      Head: Normocephalic and atraumatic.      Right Ear: Tympanic membrane normal.      Left Ear: Tympanic membrane normal.   Eyes:      Pupils: Pupils are equal, round, and reactive to light.   Cardiovascular:      Rate and Rhythm: Regular rhythm.      Heart sounds: No murmur heard.  Pulmonary:      Breath sounds: Normal breath sounds.   Abdominal:      Palpations: Abdomen is soft.   Musculoskeletal:         General: No swelling.      Cervical back: Neck supple.   Skin:     General: Skin is warm.   Neurological:      General: No focal deficit present.      Mental Status: She is alert.   Psychiatric:         Mood and Affect: Mood normal.         Assessment & Plan:   1. Routine general medical examination at a health care facility -see labs, reviewed health maint, cont wt loss and fu with CB in 4-6 months   2. Morbid obesity with BMI of 40.0-44.9, adult (HCC) -has seen wt loss clinic   3. Primary hypertension -cont meds   4. High cholesterol -on repatha, cont   5. Fibromyalgia -per rheum, on meds    6. Chronic pain of both knees -per rheum   7. History of total abdominal hysterectomy -follow   8. Statin intolerance -on repatha       Orders Placed This Encounter   Procedures    TdaP (Adacel, Boostrix) [67485]    Fluzone trivalent vaccine, PF 0.5mL, 6mo+ (46602)    Zoster Recombinant Adjuvanted (Shingrix -Shingles) [23116]       Meds This Visit:  Requested Prescriptions      No prescriptions requested or ordered in this encounter       Imaging & Referrals:  TETANUS, DIPHTHERIA TOXOIDS AND ACELLULAR PERTUSIS VACCINE (TDAP), >7 YEARS, IM USE  INFLUENZA VACCINE, TRI, PRESERV FREE, 0.5 ML  ZOSTER VACC RECOMBINANT IM NJX         [1]   Allergies  Allergen Reactions    Meloxicam OTHER (SEE COMMENTS)     Heart attack sx. Went to ER    Mobic OTHER (SEE COMMENTS)     Heart attack sx. Went to ER    Other SWELLING     Catgut suture--caused pus    Wellbutrin [Bupropion] OTHER (SEE COMMENTS)     Apathy - non caring    Seasonal Runny nose

## 2025-02-05 ENCOUNTER — PATIENT MESSAGE (OUTPATIENT)
Dept: INTERNAL MEDICINE CLINIC | Facility: CLINIC | Age: 65
End: 2025-02-05

## 2025-02-05 NOTE — TELEPHONE ENCOUNTER
Requesting   Requested Prescriptions     Pending Prescriptions Disp Refills    CUSTOM MEDICATION 2.5 each 0     Sig: Semaglutide 0.5mg    Semaglutide/Cyanocobalamin 1mg/0.5 ml    Inject 50 units/0.5ml subcutaneous weekly     Disp: 2.5ml      LOV: 05/09/24  RTC: 3-6 mo  Last Relevant Labs: n/a  Filled: 12/10/24 # with  refills    Future Appointments   Date Time Provider Department Center   3/10/2025  2:00 PM Ina Anderson PA-C [facility] [address]

## 2025-03-28 NOTE — TELEPHONE ENCOUNTER
Requesting compound increase  LOV: 5/9/24  RTC: not noted  Last Relevant Labs: na  Filled: 2/6/25 #1 with 0 refills    Future Appointments   Date Time Provider Department Center   4/1/2025 12:40 PM Ina Anderson PA-C [facility] [address]     Medication Quantity Refills Start End   CUSTOM MEDICATION 1 each 0 2/6/2025 --   Sig:   Semaglutide 1mg    Semaglutide/Cyanocobalamin  5mg/0.5 mL    Inject 0.2ml/20units subcutaneous weekly    Disp: 1mL     Route:   (none)     Note to Pharmacy:   Pt's phone: [phone]

## 2025-03-28 NOTE — TELEPHONE ENCOUNTER
Patient called for update. Informed patient there was a [employer] message sent asking if patient wants to stay at same dose or increase.   Patient states she would like to increase on dosage.  Send to   [facility] - [place], [place] - [address] [facility] BILL [phone], [phone]

## 2025-04-17 ENCOUNTER — PATIENT MESSAGE (OUTPATIENT)
Dept: INTERNAL MEDICINE CLINIC | Facility: CLINIC | Age: 65
End: 2025-04-17

## 2025-04-22 ENCOUNTER — HOSPITAL ENCOUNTER (OUTPATIENT)
Dept: GENERAL RADIOLOGY | Age: 65
Discharge: HOME | End: 2025-04-22
Payer: COMMERCIAL

## 2025-04-22 ENCOUNTER — OFFICE VISIT (OUTPATIENT)
Dept: INTERNAL MEDICINE CLINIC | Facility: CLINIC | Age: 65
End: 2025-04-22
Payer: COMMERCIAL

## 2025-04-22 VITALS
SYSTOLIC BLOOD PRESSURE: 124 MMHG | DIASTOLIC BLOOD PRESSURE: 66 MMHG | TEMPERATURE: 98 F | WEIGHT: 217.19 LBS | HEART RATE: 93 BPM | OXYGEN SATURATION: 97 % | HEIGHT: 60.63 IN | RESPIRATION RATE: 18 BRPM | BODY MASS INDEX: 41.54 KG/M2

## 2025-04-22 DIAGNOSIS — M79.89 SWELLING OF LEFT FOOT: ICD-10-CM

## 2025-04-22 DIAGNOSIS — M17.11 PRIMARY OSTEOARTHRITIS OF RIGHT KNEE: ICD-10-CM

## 2025-04-22 DIAGNOSIS — M79.673 FOOT AND ANKLE PAIN: ICD-10-CM

## 2025-04-22 DIAGNOSIS — M25.579 FOOT AND ANKLE PAIN: ICD-10-CM

## 2025-04-22 DIAGNOSIS — M17.12 PRIMARY OSTEOARTHRITIS OF LEFT KNEE: ICD-10-CM

## 2025-04-22 DIAGNOSIS — M25.579 FOOT AND ANKLE PAIN: Primary | ICD-10-CM

## 2025-04-22 DIAGNOSIS — M79.673 FOOT AND ANKLE PAIN: Primary | ICD-10-CM

## 2025-04-22 PROCEDURE — 73630 X-RAY EXAM OF FOOT: CPT

## 2025-04-22 PROCEDURE — 73610 X-RAY EXAM OF ANKLE: CPT

## 2025-04-22 PROCEDURE — 99214 OFFICE O/P EST MOD 30 MIN: CPT

## 2025-04-22 NOTE — PROGRESS NOTES
Sena Reynoso is a 64 year old female.   Chief Complaint   Patient presents with    Ankle Injury     EJ Rm 16b - Pt is here for a left twisted ankle for almost 3 weeks, pt state she didn't fall but she twisted her ankle and it swollen     HPI:      History of Present Illness  Sena Reynoso is a 64-year-old female who presents with persistent left foot an ankle pain for last three weeks. Notes swelling to left ankle and foot as well. She denies injury or trauma.     She has been experiencing persistent pain in her left foot for three weeks while going up and down stairs. She is still able to bear weight but pain and swelling not improving. No bruising or discoloration. Denies numbness or tingling.      Despite using KT tape, cold compresses, and elevation, the pain has not improved. She has not been using any specific medications for the foot pain but continues to take naproxen for her arthritis.    Her busy schedule, due to her daughter's upcoming wedding, may have impacted her ability to rest the foot adequately.     Allergies:  Allergies[1]   Current Meds:  Current Medications[2]     PMH:   Past Medical History[3]    ROS:   Review of Systems   Constitutional: Negative.    Musculoskeletal:  Positive for joint swelling (Left ankle).   Skin: Negative.         PHYSICAL EXAM:    /66   Pulse 93   Temp 97.6 °F (36.4 °C) (Temporal)   Resp 18   Ht 5' 0.63\" (1.54 m)   Wt 217 lb 3.2 oz (98.5 kg)   SpO2 97%   BMI 41.54 kg/m²   Physical Exam  Constitutional:       Appearance: Normal appearance.   Cardiovascular:      Rate and Rhythm: Normal rate.   Pulmonary:      Effort: Pulmonary effort is normal.      Breath sounds: Normal breath sounds.   Musculoskeletal:      Right ankle: Normal.      Left ankle: Swelling present. Tenderness present. Normal range of motion.      Left Achilles Tendon: Normal.      Right foot: Normal.      Left foot: Swelling and tenderness present. No deformity.   Neurological:      Mental  Status: She is alert.          ASSESSMENT/ PLAN:     Assessment & Plan  Left ankle/Left foot pain and swelling.  Ankle pain persisting for three weeks, likely due to ankle sprain.  - Order x-ray of ankle and foot to rule out fractures.  - Advise cold compress and elevation to reduce swelling.  - Provide ACE bandage for compression.  - Advise continued use of KT tape for support.      Patient requested referral to Dr. Zaragoza for continued management of OA bilateral knees.          Health Maintenance Due   Topic Date Due    Pneumococcal Vaccine: 50+ Years (1 of 1 - PCV) Never done    Zoster Vaccines (1 of 2) Never done    COVID-19 Vaccine (4 - 2024-25 season) 09/01/2024    Annual Depression Screening  01/01/2025            The following individual(s) verbally consented to be recorded using ambient AI listening technology and understand that they can each withdraw their consent to this listening technology at any point by asking the clinician to turn off or pause the recording:    Patient name: Sena Reynoso    Pt indicates understanding and agrees to the plan.     No follow-ups on file.    MEKA Coleman          [1]   Allergies  Allergen Reactions    Meloxicam OTHER (SEE COMMENTS)     Heart attack sx. Went to ER    Mobic OTHER (SEE COMMENTS)     Heart attack sx. Went to ER    Other SWELLING     Catgut suture--caused pus    Wellbutrin [Bupropion] OTHER (SEE COMMENTS)     Apathy - non caring    Seasonal Runny nose   [2]   Current Outpatient Medications   Medication Sig Dispense Refill    CUSTOM MEDICATION Semaglutide 1.7mg    Semaglutide/Cyanocobolamin  5mg/0.5ml    5/0.5 mg/mL  Inject 34 units/0.34mL into skin q weekly    Disp: 7.5mL vial ( 90 day supply ) 7.5 each 0    CYCLOBENZAPRINE 10 MG Oral Tab TAKE 1 TABLET(10 MG) BY MOUTH THREE TIMES DAILY 90 tablet 1    METFORMIN  MG Oral Tablet 24 Hr TAKE 2 TABLETS(1500 MG) BY MOUTH DAILY 180 tablet 0    naproxen 500 MG Oral Tab Take 1 tablet (500 mg total) by  mouth 2 (two) times daily as needed. 180 tablet 1    zolpidem 10 MG Oral Tab Take 1 tablet (10 mg total) by mouth nightly. 30 tablet 5    pantoprazole 40 MG Oral Tab EC Take 1 tablet (40 mg total) by mouth before breakfast. 90 tablet 2    HYDROcodone-acetaminophen (NORCO) 5-325 MG Oral Tab Take 1 tablet by mouth every 6 (six) hours as needed for Pain. 120 tablet 0    mupirocin 2 % External Ointment Apply 1 Application topically 3 (three) times daily. 30 g 0    clotrimazole-betamethasone 1-0.05 % External Cream Apply 1 Application topically 2 (two) times daily as needed. 15 g 0    lisinopril-hydroCHLOROthiazide 20-12.5 MG Oral Tab Take 1 tablet by mouth daily. 90 tablet 3    semaglutide (OZEMPIC, 0.25 OR 0.5 MG/DOSE,) 2 MG/3ML Subcutaneous Solution Pen-injector Inject 0.25 mg into the skin once a week. 3 mL 0    lidocaine 5 % External Patch Place 1 patch onto the skin daily. 30 each 0    albuterol 108 (90 Base) MCG/ACT Inhalation Aero Soln Inhale 2 puffs into the lungs every 4 (four) hours as needed for Wheezing. 1 each 0    REPATHA SURECLICK 140 MG/ML Subcutaneous Solution Auto-injector Inject 140 mg/mL into the skin every 14 (fourteen) days.      Fluticasone Propionate 50 MCG/ACT Nasal Suspension 1 spray by Nasal route daily.  0    alprazolam (XANAX) 0.5 MG Oral Tab Take 1 tablet by mouth nightly as needed. 20 tablet 0   [3]   Past Medical History:   Arthritis    Diverticulosis of large intestine    Esophageal reflux    Fibroid    Fibromyalgia    Glaucoma    High blood pressure    HIGH CHOLESTEROL    HYPERTENSION    Osteoarthritis    Ovarian cyst

## 2025-04-30 ENCOUNTER — OFFICE VISIT (OUTPATIENT)
Dept: INTERNAL MEDICINE CLINIC | Facility: CLINIC | Age: 65
End: 2025-04-30
Payer: COMMERCIAL

## 2025-04-30 VITALS
HEIGHT: 55 IN | TEMPERATURE: 98 F | SYSTOLIC BLOOD PRESSURE: 132 MMHG | RESPIRATION RATE: 16 BRPM | OXYGEN SATURATION: 97 % | BODY MASS INDEX: 49.76 KG/M2 | HEART RATE: 87 BPM | DIASTOLIC BLOOD PRESSURE: 72 MMHG | WEIGHT: 215 LBS

## 2025-04-30 DIAGNOSIS — M25.572 ACUTE LEFT ANKLE PAIN: Primary | ICD-10-CM

## 2025-04-30 DIAGNOSIS — M79.7 FIBROMYALGIA: ICD-10-CM

## 2025-04-30 DIAGNOSIS — J01.10 ACUTE NON-RECURRENT FRONTAL SINUSITIS: ICD-10-CM

## 2025-04-30 DIAGNOSIS — B00.1 HERPES LABIALIS: ICD-10-CM

## 2025-04-30 PROBLEM — Z96.651 STATUS POST TOTAL RIGHT KNEE REPLACEMENT: Status: RESOLVED | Noted: 2019-02-13 | Resolved: 2025-04-30

## 2025-04-30 PROBLEM — R07.9 CHEST PAIN, UNSPECIFIED: Status: RESOLVED | Noted: 2024-11-07 | Resolved: 2025-04-30

## 2025-04-30 PROCEDURE — 99214 OFFICE O/P EST MOD 30 MIN: CPT | Performed by: PHYSICIAN ASSISTANT

## 2025-04-30 RX ORDER — CEFUROXIME AXETIL 500 MG/1
500 TABLET ORAL 2 TIMES DAILY
Qty: 20 TABLET | Refills: 0 | Status: SHIPPED | OUTPATIENT
Start: 2025-04-30 | End: 2025-05-10

## 2025-04-30 RX ORDER — VALACYCLOVIR HYDROCHLORIDE 1 G/1
TABLET, FILM COATED ORAL
Qty: 2 TABLET | Refills: 1 | Status: SHIPPED | OUTPATIENT
Start: 2025-04-30

## 2025-04-30 NOTE — PROGRESS NOTES
The following individual(s) verbally consented to be recorded using ambient AI listening technology and understand that they can each withdraw their consent to this listening technology at any point by asking the clinician to turn off or pause the recording:    Patient name: Sena Reynoso  Additional names:  None          Chief Complaint   Patient presents with    Ankle Pain     NANDINI/Rm 2- C/o lt ankle pain x 3 wks with swelling. Had xrays done last week. No fx    Sinus Problem     C/o increased sinus congestion and headaches x 1 wk       History of Present Illness  Sena Reynoso is a 64 year old female who presents with symptoms suggestive of a sinus infection and left ankle pain.    She has been experiencing symptoms suggestive of a sinus infection for the past week, including postnasal drip, significant nasal congestion, a mild headache, and a 'little tickle, cough.' No fever or chills. Tenderness in the sinuses, particularly when pressing on certain areas, and a headache extending to her eyes are noted. The drainage is primarily down the back of her throat, with no nasal discharge.     Regarding her left ankle, she twisted it approximately four weeks ago while descending stairs. Initially, it did not bother her, but two days later, she began experiencing pain and instability, with the ankle giving out and causing near falls. Significant swelling and tenderness around the lateral ankle. An x-ray did not show any acute changes but she continues to have pain. She has been using an ACE wrap and KT tape for support, but the pain and swelling persist, impacting her mobility.  Pt would like to see her Orthopedist Dr. Zaragoza.    She also mentions the recent development of a cold sore on her lip, which began with tingling and is now sore to the touch. This new symptom is concerning to her.  She has not tried any OTC treatments.      Pt has fibromyalgia and is followed by [facility], she is in need of a new referral as  she has an upcoming appt.    Pt will be changing to Medicare approx next month.        Review of Systems   See HPI.  No other complaints today.    Past Medical History[1]    Problem List[2]    Current Medications[3]    Physical Exam  /72   Pulse 87   Temp 98 °F (36.7 °C) (Oral)   Resp 16   Ht (!) 5\" (0.127 m)   Wt 215 lb (97.5 kg)   SpO2 97%   BMI 6046.46 kg/m²   Constitutional:  No distress.   HEENT:  Normocephalic and atraumatic. Tympanic membranes normal.  Nose normal. Oropharynx is clear and moist, L lower lip with 6 mm vessicle noted. + frontal sinus tenderness with palp B  Eyes: Conjunctivae are normal. PERRLA.  Neck: Neck supple.   Cardiovascular: Normal rate, regular rhythm.  No murmur, rubs or gallops.   Pulmonary/Chest: Effort normal and breath sounds normal. No respiratory distress. No wheezes, rhonchi or rales  Lymphadenopathy: No cervical adenopathy.   MS:  L ankle:  mild soft tissue swelling around lateral malleolus with tenderness to palp of the same area.  No ecchymosis.  No erythema.  Negative drawer.    Skin: Skin is warm and dry. No rash noted. No erythema. No pallor.       Assessment & Plan  Sinusitis  Symptoms and sinus tenderness suggest acute frontal sinusitis.   - Prescribe Ceftin 500 mg, one pill twice a day for 10 days.    Left ankle pain and swelling  Persistent pain and swelling post-injury. Orthopedic evaluation needed.  - Refer to Dr. Agustin Zaragoza, orthopedic specialist, for further evaluation, pt's preference.    Cold sore  Antiviral treatment recommended to reduce duration.  - Prescribe antiviral medication for cold sore, Valtrex.  - Recommend over-the-counter Abreva for topical application.    Fibromyalgia  Follows with Dr. Buckner and has upcoming appt, needs referral renewed.      Code selection for this visit was based on time spent (32 min) on date of service in preparing to see the patient, obtaining and/or reviewing separately obtained history, performing a  medically appropriate examination, counseling and educating the patient/family/caregiver, ordering medications or testing, referring and communicating with other healthcare providers, documenting clinical information in the EHR, independently interpreting results and communicating results to the patient/family/caregiver and care coordination with the patient's other providers.      No orders of the defined types were placed in this encounter.      Meds & Refills for this Visit:  Requested Prescriptions     Signed Prescriptions Disp Refills    cefuroxime 500 MG Oral Tab 20 tablet 0     Sig: Take 1 tablet (500 mg total) by mouth 2 (two) times daily for 10 days.    valACYclovir 1 G Oral Tab 2 tablet 1     Si mg PO X 1       Imaging & Consults:  ORTHOPEDIC - EXTERNAL  RHEUMATOLOGY - INTERNAL    Return if symptoms worsen or fail to improve.  There are no Patient Instructions on file for this visit.    All questions were answered and the patient understands the plan.          [1]   Past Medical History:   Arthritis    Chest pain, unspecified    Diverticulosis of large intestine    Esophageal reflux    Fibroid    Fibromyalgia    Glaucoma    High blood pressure    HIGH CHOLESTEROL    HYPERTENSION    Osteoarthritis    Ovarian cyst   [2]   Patient Active Problem List  Diagnosis    Pernicious anemia    HTN (hypertension)    Morbid obesity with BMI of 40.0-44.9, adult (HCC)    Fibromyalgia    Primary osteoarthritis of left knee    Rotator cuff tear arthropathy of both shoulders    Primary osteoarthritis of right knee/R TKR sx 19/Global Exp 19/TRK/EP    Class 3 severe obesity due to excess calories with serious comorbidity and body mass index (BMI) of 40.0 to 44.9 in adult    Chronic pain of both knees    Pre-diabetes    History of total abdominal hysterectomy    Osteoarthritis of cervical spine with myelopathy    Primary osteoarthritis of both hands    Statin intolerance    High cholesterol    Degenerative  joint disease of cervical and lumbar spine   [3]   Current Outpatient Medications   Medication Sig Dispense Refill    cefuroxime 500 MG Oral Tab Take 1 tablet (500 mg total) by mouth 2 (two) times daily for 10 days. 20 tablet 0    valACYclovir 1 G Oral Tab 2000 mg PO X 1 2 tablet 1    CUSTOM MEDICATION Semaglutide 1.7mg    Semaglutide/Cyanocobolamin  5mg/0.5ml    5/0.5 mg/mL  Inject 34 units/0.34mL into skin q weekly    Disp: 7.5mL vial ( 90 day supply ) 7.5 each 0    CYCLOBENZAPRINE 10 MG Oral Tab TAKE 1 TABLET(10 MG) BY MOUTH THREE TIMES DAILY 90 tablet 1    METFORMIN  MG Oral Tablet 24 Hr TAKE 2 TABLETS(1500 MG) BY MOUTH DAILY 180 tablet 0    naproxen 500 MG Oral Tab Take 1 tablet (500 mg total) by mouth 2 (two) times daily as needed. 180 tablet 1    zolpidem 10 MG Oral Tab Take 1 tablet (10 mg total) by mouth nightly. 30 tablet 5    pantoprazole 40 MG Oral Tab EC Take 1 tablet (40 mg total) by mouth before breakfast. 90 tablet 2    HYDROcodone-acetaminophen (NORCO) 5-325 MG Oral Tab Take 1 tablet by mouth every 6 (six) hours as needed for Pain. 120 tablet 0    mupirocin 2 % External Ointment Apply 1 Application topically 3 (three) times daily. 30 g 0    clotrimazole-betamethasone 1-0.05 % External Cream Apply 1 Application topically 2 (two) times daily as needed. 15 g 0    lisinopril-hydroCHLOROthiazide 20-12.5 MG Oral Tab Take 1 tablet by mouth daily. 90 tablet 3    semaglutide (OZEMPIC, 0.25 OR 0.5 MG/DOSE,) 2 MG/3ML Subcutaneous Solution Pen-injector Inject 0.25 mg into the skin once a week. 3 mL 0    lidocaine 5 % External Patch Place 1 patch onto the skin daily. 30 each 0    albuterol 108 (90 Base) MCG/ACT Inhalation Aero Soln Inhale 2 puffs into the lungs every 4 (four) hours as needed for Wheezing. 1 each 0    REPATHA SURECLICK 140 MG/ML Subcutaneous Solution Auto-injector Inject 140 mg/mL into the skin every 14 (fourteen) days.      Fluticasone Propionate 50 MCG/ACT Nasal Suspension 1 spray by  Nasal route daily.  0    alprazolam (XANAX) 0.5 MG Oral Tab Take 1 tablet by mouth nightly as needed. 20 tablet 0

## 2025-05-17 DIAGNOSIS — M17.11 PRIMARY OSTEOARTHRITIS OF RIGHT KNEE: ICD-10-CM

## 2025-05-17 DIAGNOSIS — M12.811 ROTATOR CUFF TEAR ARTHROPATHY OF BOTH SHOULDERS: ICD-10-CM

## 2025-05-17 DIAGNOSIS — M75.102 ROTATOR CUFF TEAR ARTHROPATHY OF BOTH SHOULDERS: ICD-10-CM

## 2025-05-17 DIAGNOSIS — M47.816 DEGENERATIVE JOINT DISEASE OF CERVICAL AND LUMBAR SPINE: ICD-10-CM

## 2025-05-17 DIAGNOSIS — M79.7 FIBROMYALGIA: Primary | ICD-10-CM

## 2025-05-17 DIAGNOSIS — M75.101 ROTATOR CUFF TEAR ARTHROPATHY OF BOTH SHOULDERS: ICD-10-CM

## 2025-05-17 DIAGNOSIS — M47.12 OSTEOARTHRITIS OF CERVICAL SPINE WITH MYELOPATHY: ICD-10-CM

## 2025-05-17 DIAGNOSIS — M47.812 DEGENERATIVE JOINT DISEASE OF CERVICAL AND LUMBAR SPINE: ICD-10-CM

## 2025-05-17 DIAGNOSIS — M17.12 PRIMARY OSTEOARTHRITIS OF LEFT KNEE: ICD-10-CM

## 2025-05-17 DIAGNOSIS — M12.812 ROTATOR CUFF TEAR ARTHROPATHY OF BOTH SHOULDERS: ICD-10-CM

## 2025-05-19 ENCOUNTER — TELEPHONE (OUTPATIENT)
Facility: CLINIC | Age: 65
End: 2025-05-19

## 2025-05-19 RX ORDER — CYCLOBENZAPRINE HCL 10 MG
10 TABLET ORAL 3 TIMES DAILY
Qty: 90 TABLET | Refills: 1 | Status: SHIPPED | OUTPATIENT
Start: 2025-05-19

## 2025-05-19 NOTE — TELEPHONE ENCOUNTER
Cyclobenzaprine 10 mg 1 3 times a day #90 x 1.  Sent to [facility] in [place].  Dr. Buckner

## 2025-05-19 NOTE — TELEPHONE ENCOUNTER
Pharmacy called office to clarify new prescription for cyclobenzaprine. Patient is also taking zolpidem and alprazolam. Would like clarification Dr. Buckner is okay with prescribing.

## 2025-05-19 NOTE — TELEPHONE ENCOUNTER
Cyclobenzaprine 10 mg    Future Appointments   Date Time Provider Department Center   6/5/2025  9:40 AM Everett Buckner MD [facility]     Last office visit: 11/18/2024    Last fill: 12/31/2024 90 tab, 1 refill

## 2025-07-01 ENCOUNTER — OFFICE VISIT (OUTPATIENT)
Dept: RHEUMATOLOGY | Facility: CLINIC | Age: 65
End: 2025-07-01
Payer: MEDICARE

## 2025-07-01 VITALS
OXYGEN SATURATION: 97 % | DIASTOLIC BLOOD PRESSURE: 96 MMHG | WEIGHT: 208 LBS | HEART RATE: 108 BPM | RESPIRATION RATE: 16 BRPM | BODY MASS INDEX: 40.84 KG/M2 | SYSTOLIC BLOOD PRESSURE: 156 MMHG | HEIGHT: 60 IN | TEMPERATURE: 97 F

## 2025-07-01 DIAGNOSIS — M17.11 PRIMARY OSTEOARTHRITIS OF RIGHT KNEE: ICD-10-CM

## 2025-07-01 DIAGNOSIS — M79.7 FIBROMYALGIA: ICD-10-CM

## 2025-07-01 DIAGNOSIS — M17.12 PRIMARY OSTEOARTHRITIS OF LEFT KNEE: ICD-10-CM

## 2025-07-01 DIAGNOSIS — M19.041 PRIMARY OSTEOARTHRITIS OF BOTH HANDS: ICD-10-CM

## 2025-07-01 DIAGNOSIS — M47.816 DEGENERATIVE JOINT DISEASE OF CERVICAL AND LUMBAR SPINE: ICD-10-CM

## 2025-07-01 DIAGNOSIS — E66.813 CLASS 3 SEVERE OBESITY DUE TO EXCESS CALORIES WITH SERIOUS COMORBIDITY AND BODY MASS INDEX (BMI) OF 40.0 TO 44.9 IN ADULT: ICD-10-CM

## 2025-07-01 DIAGNOSIS — M19.042 PRIMARY OSTEOARTHRITIS OF BOTH HANDS: ICD-10-CM

## 2025-07-01 DIAGNOSIS — E66.01 MORBID OBESITY WITH BMI OF 40.0-44.9, ADULT (HCC): Primary | ICD-10-CM

## 2025-07-01 DIAGNOSIS — M47.812 DEGENERATIVE JOINT DISEASE OF CERVICAL AND LUMBAR SPINE: ICD-10-CM

## 2025-07-01 DIAGNOSIS — Z96.651 STATUS POST TOTAL RIGHT KNEE REPLACEMENT: ICD-10-CM

## 2025-07-01 PROCEDURE — 99214 OFFICE O/P EST MOD 30 MIN: CPT | Performed by: INTERNAL MEDICINE

## 2025-07-01 RX ORDER — HYDROCODONE BITARTRATE AND ACETAMINOPHEN 5; 325 MG/1; MG/1
1 TABLET ORAL EVERY 6 HOURS PRN
Qty: 120 TABLET | Refills: 0 | Status: CANCELLED
Start: 2025-07-01

## 2025-07-01 RX ORDER — ZOLPIDEM TARTRATE 10 MG/1
10 TABLET ORAL NIGHTLY
Qty: 30 TABLET | Refills: 5 | Status: SHIPPED | OUTPATIENT
Start: 2025-07-01 | End: 2026-06-26

## 2025-07-01 NOTE — PATIENT INSTRUCTIONS
S/p right total knee replacement 2019.  Left knee has advanced osteoarthritis.  Naproxen 500 mg twice a day.  Norco 5 mg as needed for pain.    Cyclobenzaprine 10 mg as needed for spasm  Zolpidem 10 mg at night for sleep prn.  Use otc voltaren gel prn.  Lose weight.   [facility] left knee [facility].  Return to office 6 months,

## 2025-07-01 NOTE — PROGRESS NOTES
[facility]  Dr. Buckner Progress Note     Subjective:   Sena Reynoso is a(n) 65 year old female.   Current complaints:   Chief Complaint   Patient presents with    Osteoarthritis    Fibromyalgia Syndrome     LOV: 11/18/24  Patient is here for a follow up visit. Patient states that she's been in a lot of pain, especially over the last 2 months. Pain all over but mostly shoulders, feet, knees (10/10)  Rapid 3:    History of fibromyalgia, cervical spondylosis, right knee replacement 2019, right shoulder rotator cuff issues, left knee osteoarthritis.    Recently in [place] for a wedding.   C/o extreme pain.  Bad pain in both knees, also pain right shoulder.  Needle like pain in bottom of feet.    In [place] received gel injection in left knee.    Given a medication to relief the inflammation .   Also got cortisone shots a month before going to [facility].  Taking Naproxen.   Occasionally cyclobenzaprine.  Can't tolerate gabapentin.   Has Naproxen and cyclobenzaprine at home,   Objective:   BP (!) 156/96   Pulse 108   Temp 97.3 °F (36.3 °C)   Resp 16   Ht 5' (1.524 m)   Wt 208 lb (94.3 kg)   SpO2 97%   BMI 40.62 kg/m²   Abd obese. Lungs clear  Heart nsr   Right knee artificial  Left knee tender and hypertrophic  No leg edema.    Assessment:     Encounter Diagnoses   Name Primary?    Primary osteoarthritis of left knee     Primary osteoarthritis of right knee/R TKR sx 2-13-19/Global Exp 5-13-19/TRK/EP     Status post total right knee replacement     Degenerative joint disease of cervical and lumbar spine     Class 3 severe obesity due to excess calories with serious comorbidity and body mass index (BMI) of 40.0 to 44.9 in adult     Fibromyalgia     Primary osteoarthritis of both hands      Plan:     Patient Instructions   S/p right total knee replacement 2019.  Left knee has advanced osteoarthritis.  Naproxen 500 mg twice a day.  Norco 5 mg as needed for pain.    Cyclobenzaprine 10 mg as needed for spasm  Zolpidem  10 mg at night for sleep prn.  Use otc voltaren gel prn.  Lose weight.   [facility] regarding future left knee injections.  Return to office 6 months,        Everett Buckner MD 7/1/2025 12:16 PM

## 2025-08-19 DIAGNOSIS — I10 PRIMARY HYPERTENSION: ICD-10-CM

## 2025-08-22 ENCOUNTER — TELEPHONE (OUTPATIENT)
Dept: INTERNAL MEDICINE CLINIC | Facility: CLINIC | Age: 65
End: 2025-08-22

## 2025-08-22 RX ORDER — LISINOPRIL AND HYDROCHLOROTHIAZIDE 12.5; 2 MG/1; MG/1
1 TABLET ORAL DAILY
Qty: 90 TABLET | Refills: 1 | Status: SHIPPED | OUTPATIENT
Start: 2025-08-22

## (undated) DEVICE — Device: Brand: STABLECUT®

## (undated) DEVICE — 3 ML SYRINGE LUER-LOCK TIP: Brand: MONOJECT

## (undated) DEVICE — Device: Brand: DEFENDO AIR/WATER/SUCTION AND BIOPSY VALVE

## (undated) DEVICE — STERILE POLYISOPRENE POWDER-FREE SURGICAL GLOVES: Brand: PROTEXIS

## (undated) DEVICE — ZIMMER® STERILE DISPOSABLE TOURNIQUET CUFF WITH PLC, DUAL PORT, SINGLE BLADDER, 34 IN. (86 CM)

## (undated) DEVICE — 2T11 #2 PDO 36 X 36: Brand: 2T11 #2 PDO 36 X 36

## (undated) DEVICE — WRAP COOLING KNEE W/ICE PILLOW

## (undated) DEVICE — TOTAL KNEE CDS: Brand: MEDLINE INDUSTRIES, INC.

## (undated) DEVICE — 450 ML BOTTLE OF 0.05% CHLORHEXIDINE GLUCONATE IN 99.95% STERILE WATER FOR IRRIGATION, USP AND APPLICATOR.: Brand: IRRISEPT ANTIMICROBIAL WOUND LAVAGE

## (undated) DEVICE — UNIVERSAL STERIBUMP® STERILE (5/CASE): Brand: UNIVERSAL STERIBUMP®

## (undated) DEVICE — BANDAGE ELASTIC ACE 6\" X-LONG

## (undated) DEVICE — STERILE LATEX POWDER-FREE SURGICAL GLOVESWITH NITRILE COATING: Brand: PROTEXIS

## (undated) DEVICE — SIGMA LCS HIGH PERFORMANCE STERILE THREADED HEADED PINS: Brand: SIGMA LCS HIGH PERFORMANCE

## (undated) DEVICE — 6 ML SYRINGE LUER-LOCK TIP: Brand: MONOJECT

## (undated) DEVICE — GOWN SURG AERO CHROME XXL

## (undated) DEVICE — BIPOLAR SEALER 23-112-1 AQM 6.0: Brand: AQUAMANTYS™

## (undated) DEVICE — HOOD, PEEL-AWAY: Brand: FLYTE

## (undated) DEVICE — DRESSING AQUACEL AG 3.5X12

## (undated) DEVICE — CONVERTORS STOCKINETTE: Brand: CONVERTORS

## (undated) DEVICE — BOWL CEMENT MIX QUICK-VAC

## (undated) DEVICE — MEDI-VAC NON-CONDUCTIVE SUCTION TUBING 6MM X 1.8M (6FT.) L: Brand: CARDINAL HEALTH

## (undated) DEVICE — FORCEP RADIAL JAW 4

## (undated) DEVICE — 35 ML SYRINGE REGULAR TIP: Brand: MONOJECT

## (undated) DEVICE — KENDALL SCD EXPRESS SLEEVES, KNEE LENGTH, MEDIUM: Brand: KENDALL SCD

## (undated) DEVICE — Device: Brand: CUSTOM PROCEDURE KIT

## (undated) DEVICE — LINE MNTR ADLT SET O2 INTMD

## (undated) DEVICE — DECANTER BAG 9": Brand: MEDLINE INDUSTRIES, INC.

## (undated) DEVICE — SUTURE VICRYL 1 OS-6

## (undated) DEVICE — SUTURE VICRYL 2-0 CP-1

## (undated) DEVICE — DRAPE,U/SHT,SPLIT,FILM,60X84,STERILE: Brand: MEDLINE

## (undated) DEVICE — SOL  .9 1000ML BTL

## (undated) DEVICE — SPECIMEN CONTAINER,POSITIVE SEAL INDICATOR, OR PACKAGED: Brand: PRECISION

## (undated) DEVICE — CHLORAPREP 26ML APPLICATOR

## (undated) NOTE — ED AVS SNAPSHOT
Joes Rafael Mclean   MRN: [id number]    Department:  [facility] Emergency Department in [place]   Date of Visit:  9/7/2018           Disclosure     Insurance plans vary and the physician(s) referred by the ER may not be covered by your plan.  Please contact y tell this physician (or your personal doctor if your instructions are to return to your personal doctor) about any new or lasting problems. The primary care or specialist physician will see patients referred from the [facility] Emergency Department.  Corby Nino

## (undated) NOTE — LETTER
[facility] Testing Department  Phone: [phone]  Right Fax: [phone]  [facility] 20 By: JOLEEN Zhang RN Date: 19    [facility]  [facility] Date: [facility]    CSN: [id number]  Medical Record: [id number]   : 5

## (undated) NOTE — LETTER
[facility] Testing Department  Phone: [phone]  Right Fax: [phone]  [facility] 20 Rhina Gary RN Date: 19    Patient Name: Marlen Pugh  Surgery Date: 2019    CSN: [id number]  Medical Record: [id number]   :
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
## (undated) NOTE — LETTER
7/10/2021  [facility]   [address] [address]    Dear Lilly Grey,     Here are the results from your recent testing : Your colonoscopy showed diverticulosis.   These are pockets in the colon which are due to a relatively low fiber diet

## (undated) NOTE — Clinical Note
Demetrio Roca, I saw Mrs. Tigist Burks today and please see my note. Cherelle Gerard. Please let me know if any further questions!   Pascual Leigh

## (undated) NOTE — LETTER
Patient Name: Robert Jacinto  YOB: 1960          MRN number:  [id number]  Date:  11/28/2018  Referring Physician:  Jude Cunningham     Progress Summary    Pt has attended 8 visits in Physical Therapy.      Subjective:  Notes seeing Dr. Shekhar Real Active shoulder elevation while standing:  R 135. L 125 with pain. Hand behind left is 1\" less than right with pain reported. Palpation:  Tenderness medial joint line right knee, right HS tendons, sub-occipital area, left UT/LS mm. .      Goals:    To

## (undated) NOTE — LETTER
OPIOID TREATMENT AGREEMENT    For Pain Management      Please read each statement, initial at the bottom of each page, and sign the last page to indicate your agreement with this form. If you have any questions about any information in this form or the opioid treatment plan, please request immediate clarification from your physician or health care provider.     The purpose of this agreement is to give you information about the medications you will be taking for pain management and to assure that you and your physician/health care provider comply with state and federal regulations concerning the prescribing of controlled substances. A trial of opioid therapy can be considered for moderate to severe pain with the intent of reducing pain and increasing function. The physician’s goal is for you to have the best quality of life possible given the reality of your clinical condition. The success of treatment depends on mutual trust and honesty in the physician/patient relationship and full agreement and understanding of the risks and benefits of using opioids to treat pain.    I agree to use opioids (morphine-like drugs) as part of my treatment for chronic pain.  I understand that these drugs can be effective, but have a high potential for misuse and are therefore closely controlled by the local, state, and federal government. I understand that my physician/health care provider is prescribing such medication to help manage my pain, and I agree to the following conditions as part of my treatment plan    I am responsible for my pain medications.  I agree to take the medication only as prescribed.    I understand that increasing my dose without the close supervision of my physician could    lead to drug overdose causing severe sedation and respiratory depression and death.    I understand that decreasing or stopping my medication without the close supervision of my physician can lead to withdrawal. Withdrawal symptoms can  include yawning, sweating, watery eyes, runny nose, anxiety, tremors, aching muscles, hot and cold flashes, “goose bumps”, abdominal cramps, and diarrhea.  These symptoms can occur 24-48 hours after the last dose and can last up to 3 weeks.    I will not request or accept controlled substance medication from any other physician or individual while I am receiving such medication from my physician/health care provider at the clinic.    I acknowledge that there are side effects with opioid therapy, and I understand it is my responsibility to notify my physician/health care provider for any side effect that continue or are severe (i.e., difficulty breathing, slow heart rate, sedation, confusion).  I am also responsible for notifying my pain physician immediately if I need to visit another physician or need to visit an emergency room due to pain, of if I become pregnant.    I understand that the opioid medication is strictly for my own use and I agree not to give or sell my medication to others because it may endanger another person’s health and is against the law.    I will inform my physician of all medications I am taking, including herbal remedies.  Medications like Valium or Ativan; sedatives such as Soma, Xanax, Fiorinal; antihistamines like Benadryl; herbal remedies, alcohol, and cough syrup containing alcohol, codeine, or hydrocodone can interact with opioids and produce serious side effects.    I understand I will be expected to return to the clinic as instructed by my provider during the time any of my medication is being adjusted.    I understand that any evidence of drug hoarding, acquisition of any opioid medication or adjunctive analgesia from other physicians (which includes emergency rooms), uncontrolled dose escalation or reduction, loss of prescriptions or failure to follow agreement may result in change to the treatment plan, referral to the [facility] Therapy [facility], an may result in  termination of the physician/patient relationship.    I will not use any illicit substances, such as cocaine, marijuana, etc. while taking these medications.  I understand that use of any illicit substances while taking these medications may result in a change to my treatment plan, referral to the [facility], and may result in termination of the physician/patient relationship.    I understand that I should not consume alcohol while taking these medications because the use of alcohol together with opioid medications is warned against.    I am responsible for my opioid prescriptions.  I understand that:    Refill prescriptions can be written for a one month supply and increased to a maximum of two months at the discretion of the provider.    It is my responsibility to schedule appointments for the next opioid refill to ensure I do not run out of medications.    I am responsible for keeping my prescriptions and pain medications in a safe and secure place, such as a locked cabinet or safe.  I am expected to protect my medications from loss or theft.  I am responsible for taking the medication in the dose prescribed and for keeping track of the amount remaining.  If my medication is stolen, I will report this to my local police department and obtain a stolen item report.  I will then report the stolen medication to my physician.  If my medications are lost, misplaced, or stolen, my physician may choose not to replace the medications.    Refills issued by physicians/health care providers in this clinic can only be filled by a pharmacy in the State of [place], even if I am a resident of another state.    Prescriptions for pain medicine or any other prescriptions will be written only during an office visit or during regular office hours.  No refills of any medications during the evening or on weekends.    I must bring back all opioid medications and adjunctive medications prescribed by my physician  in the original containers/bottles at every visit.    Prescriptions will not be written in advance due to vacations, meetings, or other commitments.    If an appointment for a prescription refill is missed, another appointment will be made as soon as possible.  Immediate or emergency appointments will not be possible.    I understand while physical dependence is to be expected after long-term use of opioids, signs of addiction, abuse, or misuse shall prompt the need for substance dependence treatment as well as weaning and detoxification from the opioids.  I further understand the following:    Physical dependence is common to many drugs such as blood pressure medications, anti-seizure medications, and opioids.  It results in biochemical changes such that abruptly stopping these drugs will cause a withdrawal response.  It should be noted that physical dependence does not equal addiction.  A person can be dependent on insulin to treat diabetes or dependent on prednisone (steroids) to treat asthma, but not addicted to the insulin or prednisone.    Addiction is a primary, chronic neurobiologic disease with genetic, psychosocial and environmental factors influencing its development and manifestation.  It is characterized by behavior that includes one or more of the following: impaired control over drug use, compulsive use, continued use despite harm, and cravings.  This means the drug decreases a person’s quality of life.  If a patient exhibits such behavior, the drug will be tapered and the patient will not be a candidate for an opioid trial.  He/she will be referred to an addiction medicine specialist.    Tolerance means a state of adaption in which exposure to the drug induces changes that result in a lessening of one or more of the drug’s effects over time.  The dose of the opioid may have to be adjusted up or down to a dose that produces maximum function and a realistic decrease of the patient’s pain.    If it  appears to my physician/health care provider that there is no improvement in my daily function or quality of life from the controlled substance, I understand my opioids may be discontinued.    If I have a history of alcohol or drug misuse/addiction, I must notify the physician of such history since the treatment with opioids for may increase the possibility of relapse.    I agree and understand that my physician reserves the right to perform random or unannounced urine drug testing.  If requested to provide a urine sample, I agree to cooperate.  If I decide not to provide a urine sample, I understand that my physician may change my treatment plan, including discontinuation of my opioid medications when applicable or complete termination of the physician/patient relationship.  The presence of non-prescribed drug(s) or illicit drug(s) in the urine can be grounds for termination of the physician/patient relationship.  Urine drug testing is not forensic testing, but is done for my benefit as a diagnostic tool and in accordance with certain legal and regulatory guidance on the use of controlled substances to treat pain.    I agree to allow my physician/miracle care provider to contact any health care professional, including behavioral health, family member, pharmacy, legal authority, or regulator agency to obtain or provide information about my care or actions if the physician feels it is necessary.    I understand that non-compliance with the above conditions may result in a re-evaluation of my treatment plan, referral to the [facility], discontinuation of opioid therapy, and possible discharge from the clinic.    I agree to work closely with my physician/health care provider to assure the agreed plan of care is followed.    I acknowledge that I have received a copy of this agreement for my records.          I __________________________________________ have read the above information or it has been  read to me.  All my questions regarding the treatment of pain with opioids have been answered to my satisfaction, and I understand all of the conditions of my participation in the opioid treatment plan listed above.  I hereby agree to the conditions listed about and consent to participate in the opioid medication therapy.        ______________________________    ____________    ______________________________              Patient Signature                                  Date                        Patient Printed Name  ______________________________    ____________    ______________________________              Witness Signature                                Date                       Witness Printed Name